# Patient Record
Sex: MALE | Race: BLACK OR AFRICAN AMERICAN | NOT HISPANIC OR LATINO | Employment: UNEMPLOYED | RURAL
[De-identification: names, ages, dates, MRNs, and addresses within clinical notes are randomized per-mention and may not be internally consistent; named-entity substitution may affect disease eponyms.]

---

## 2020-03-13 ENCOUNTER — HISTORICAL (OUTPATIENT)
Dept: ADMINISTRATIVE | Facility: HOSPITAL | Age: 45
End: 2020-03-13

## 2020-03-13 LAB
ALBUMIN SERPL BCP-MCNC: 2.8 G/DL (ref 3.5–5)
ALBUMIN/GLOB SERPL: 0.5 {RATIO}
ALP SERPL-CCNC: 174 U/L (ref 45–115)
ALT SERPL W P-5'-P-CCNC: 74 U/L (ref 16–61)
AST SERPL W P-5'-P-CCNC: 111 U/L (ref 15–37)
BASOPHILS # BLD AUTO: 0.02 X10E3/UL (ref 0–0.2)
BASOPHILS NFR BLD AUTO: 0.2 % (ref 0–1)
BILIRUB SERPL-MCNC: 6.2 MG/DL (ref 0–1.2)
BILIRUB UR QL STRIP: ABNORMAL MG/DL
BUN SERPL-MCNC: 14 MG/DL (ref 7–18)
CALCIUM SERPL-MCNC: 7.9 MG/DL (ref 8.5–10.1)
CHLORIDE SERPL-SCNC: 97 MMOL/L (ref 98–107)
CLARITY UR: CLEAR
CO2 SERPL-SCNC: 27 MMOL/L (ref 21–32)
COLOR UR: YELLOW
CREAT SERPL-MCNC: 0.92 MG/DL (ref 0.7–1.3)
EOSINOPHIL # BLD AUTO: 0.05 X10E3/UL (ref 0–0.5)
EOSINOPHIL NFR BLD AUTO: 0.6 % (ref 1–4)
ERYTHROCYTE [DISTWIDTH] IN BLOOD BY AUTOMATED COUNT: 16.6 % (ref 11.5–14.5)
GLOBULIN SER-MCNC: 5.5 G/DL (ref 2–4)
GLUCOSE SERPL-MCNC: 92 MG/DL (ref 74–106)
GLUCOSE UR STRIP-MCNC: NORMAL MG/DL
HCT VFR BLD AUTO: 33.4 % (ref 40–54)
HGB BLD-MCNC: 12.3 G/DL (ref 13.5–18)
KETONES UR STRIP-SCNC: ABNORMAL MG/DL
LEUKOCYTE ESTERASE UR QL STRIP: NEGATIVE LEU/UL
LYMPHOCYTES # BLD AUTO: 1.97 X10E3/UL (ref 1–4.8)
LYMPHOCYTES NFR BLD AUTO: 22.9 % (ref 27–41)
MCH RBC QN AUTO: 36.1 PG (ref 27–31)
MCHC RBC AUTO-ENTMCNC: 35.7 G/DL (ref 32–36)
MCV RBC AUTO: 98 FL (ref 80–96)
MONOCYTES # BLD AUTO: 1.53 X10E3/UL (ref 0–0.8)
MONOCYTES NFR BLD AUTO: 17.8 % (ref 2–6)
MPC BLD CALC-MCNC: 11.9 FL (ref 9.4–12.4)
NEUTROPHILS # BLD AUTO: 5.04 X10E3/UL (ref 1.8–7.7)
NEUTROPHILS NFR BLD AUTO: 58.5 % (ref 53–65)
NITRITE UR QL STRIP: NEGATIVE
PH UR STRIP: 6 PH UNITS (ref 5–8)
PLATELET # BLD AUTO: 95 X10E3/UL (ref 150–450)
POTASSIUM SERPL-SCNC: 3.4 MMOL/L (ref 3.5–5.1)
PROT SERPL-MCNC: 8.3 G/DL (ref 6.4–8.2)
PROT UR QL STRIP: NEGATIVE MG/DL
RBC # BLD AUTO: 3.41 X10E6/UL (ref 4.6–6.2)
RBC # UR STRIP: NEGATIVE ERY/UL
SODIUM SERPL-SCNC: 134 MMOL/L (ref 136–145)
SP GR UR STRIP: 1.02 (ref 1–1.03)
UROBILINOGEN UR STRIP-ACNC: 2 MG/DL
WBC # BLD AUTO: 8.61 X10E3/UL (ref 4.5–11)

## 2023-08-19 ENCOUNTER — HOSPITAL ENCOUNTER (INPATIENT)
Facility: HOSPITAL | Age: 48
LOS: 19 days | Discharge: ANOTHER HOSPITAL | DRG: 988 | End: 2023-09-08
Attending: EMERGENCY MEDICINE | Admitting: INTERNAL MEDICINE
Payer: COMMERCIAL

## 2023-08-19 DIAGNOSIS — R07.9 CHEST PAIN: ICD-10-CM

## 2023-08-19 DIAGNOSIS — E88.09 HYPOALBUMINEMIA: ICD-10-CM

## 2023-08-19 DIAGNOSIS — R60.9 EDEMA: Primary | ICD-10-CM

## 2023-08-19 DIAGNOSIS — K64.9 BLEEDING HEMORRHOID: ICD-10-CM

## 2023-08-19 DIAGNOSIS — K74.60 CIRRHOSIS: ICD-10-CM

## 2023-08-19 DIAGNOSIS — D64.9 ANEMIA, UNSPECIFIED TYPE: ICD-10-CM

## 2023-08-19 LAB
ABORH RETYPE: NORMAL
ALBUMIN SERPL BCP-MCNC: 2.1 G/DL (ref 3.5–5)
ALBUMIN/GLOB SERPL: 0.4 {RATIO}
ALP SERPL-CCNC: 166 U/L (ref 45–115)
ALT SERPL W P-5'-P-CCNC: 41 U/L (ref 16–61)
ANION GAP SERPL CALCULATED.3IONS-SCNC: 7 MMOL/L (ref 7–16)
APTT PPP: 48.2 SECONDS (ref 25.2–37.3)
AST SERPL W P-5'-P-CCNC: 61 U/L (ref 15–37)
BASOPHILS # BLD AUTO: 0.02 K/UL (ref 0–0.2)
BASOPHILS NFR BLD AUTO: 0.4 % (ref 0–1)
BILIRUB SERPL-MCNC: 6.5 MG/DL (ref ?–1.2)
BILIRUB UR QL STRIP: NEGATIVE
BUN SERPL-MCNC: 10 MG/DL (ref 7–18)
BUN/CREAT SERPL: 9 (ref 6–20)
CALCIUM SERPL-MCNC: 7.9 MG/DL (ref 8.5–10.1)
CHLORIDE SERPL-SCNC: 101 MMOL/L (ref 98–107)
CLARITY UR: CLEAR
CO2 SERPL-SCNC: 29 MMOL/L (ref 21–32)
COLOR UR: YELLOW
CREAT SERPL-MCNC: 1.11 MG/DL (ref 0.7–1.3)
DIFFERENTIAL METHOD BLD: ABNORMAL
EGFR (NO RACE VARIABLE) (RUSH/TITUS): 81 ML/MIN/1.73M2
EOSINOPHIL # BLD AUTO: 0.35 K/UL (ref 0–0.5)
EOSINOPHIL NFR BLD AUTO: 6.2 % (ref 1–4)
ERYTHROCYTE [DISTWIDTH] IN BLOOD BY AUTOMATED COUNT: 18.9 % (ref 11.5–14.5)
GLOBULIN SER-MCNC: 5.4 G/DL (ref 2–4)
GLUCOSE SERPL-MCNC: 106 MG/DL (ref 74–106)
GLUCOSE UR STRIP-MCNC: NORMAL MG/DL
HCT VFR BLD AUTO: 19 % (ref 40–54)
HGB BLD-MCNC: 6.1 G/DL (ref 13.5–18)
IMM GRANULOCYTES # BLD AUTO: 0.03 K/UL (ref 0–0.04)
IMM GRANULOCYTES NFR BLD: 0.5 % (ref 0–0.4)
INDIRECT COOMBS: NORMAL
INR BLD: 1.87
KETONES UR STRIP-SCNC: NEGATIVE MG/DL
LEUKOCYTE ESTERASE UR QL STRIP: NEGATIVE
LYMPHOCYTES # BLD AUTO: 1 K/UL (ref 1–4.8)
LYMPHOCYTES NFR BLD AUTO: 17.7 % (ref 27–41)
MAGNESIUM SERPL-MCNC: 2.2 MG/DL (ref 1.7–2.3)
MCH RBC QN AUTO: 31.6 PG (ref 27–31)
MCHC RBC AUTO-ENTMCNC: 32.1 G/DL (ref 32–36)
MCV RBC AUTO: 98.4 FL (ref 80–96)
MONOCYTES # BLD AUTO: 1.08 K/UL (ref 0–0.8)
MONOCYTES NFR BLD AUTO: 19.1 % (ref 2–6)
MPC BLD CALC-MCNC: 12.6 FL (ref 9.4–12.4)
NEUTROPHILS # BLD AUTO: 3.18 K/UL (ref 1.8–7.7)
NEUTROPHILS NFR BLD AUTO: 56.1 % (ref 53–65)
NITRITE UR QL STRIP: NEGATIVE
NRBC # BLD AUTO: 0 X10E3/UL
NRBC, AUTO (.00): 0 %
NT-PROBNP SERPL-MCNC: 255 PG/ML (ref 1–125)
PH UR STRIP: 6 PH UNITS
PLATELET # BLD AUTO: 78 K/UL (ref 150–400)
POC OCCULT BLOOD STOOL: NEGATIVE
POTASSIUM SERPL-SCNC: 2.8 MMOL/L (ref 3.5–5.1)
PROT SERPL-MCNC: 7.5 G/DL (ref 6.4–8.2)
PROT UR QL STRIP: NEGATIVE
PROTHROMBIN TIME: 21.2 SECONDS (ref 11.7–14.7)
RBC # BLD AUTO: 1.93 M/UL (ref 4.6–6.2)
RBC # UR STRIP: NEGATIVE /UL
RH BLD: NORMAL
SODIUM SERPL-SCNC: 134 MMOL/L (ref 136–145)
SP GR UR STRIP: 1.01
SPECIMEN OUTDATE: NORMAL
TROPONIN I SERPL DL<=0.01 NG/ML-MCNC: 16.7 PG/ML
UROBILINOGEN UR STRIP-ACNC: 6 MG/DL
WBC # BLD AUTO: 5.66 K/UL (ref 4.5–11)

## 2023-08-19 PROCEDURE — 82272 OCCULT BLD FECES 1-3 TESTS: CPT

## 2023-08-19 PROCEDURE — 99285 PR EMERGENCY DEPT VISIT,LEVEL V: ICD-10-PCS | Mod: ,,, | Performed by: EMERGENCY MEDICINE

## 2023-08-19 PROCEDURE — 86900 BLOOD TYPING SEROLOGIC ABO: CPT | Performed by: EMERGENCY MEDICINE

## 2023-08-19 PROCEDURE — 93010 EKG 12-LEAD: ICD-10-PCS | Mod: ,,, | Performed by: HOSPITALIST

## 2023-08-19 PROCEDURE — 80053 COMPREHEN METABOLIC PANEL: CPT | Performed by: EMERGENCY MEDICINE

## 2023-08-19 PROCEDURE — 99285 EMERGENCY DEPT VISIT HI MDM: CPT | Mod: ,,, | Performed by: EMERGENCY MEDICINE

## 2023-08-19 PROCEDURE — 93005 ELECTROCARDIOGRAM TRACING: CPT

## 2023-08-19 PROCEDURE — 86923 COMPATIBILITY TEST ELECTRIC: CPT | Performed by: EMERGENCY MEDICINE

## 2023-08-19 PROCEDURE — 86923 COMPATIBILITY TEST ELECTRIC: CPT | Mod: 91 | Performed by: NURSE PRACTITIONER

## 2023-08-19 PROCEDURE — 99285 EMERGENCY DEPT VISIT HI MDM: CPT

## 2023-08-19 PROCEDURE — 85025 COMPLETE CBC W/AUTO DIFF WBC: CPT | Performed by: EMERGENCY MEDICINE

## 2023-08-19 PROCEDURE — 81003 URINALYSIS AUTO W/O SCOPE: CPT | Performed by: EMERGENCY MEDICINE

## 2023-08-19 PROCEDURE — 84484 ASSAY OF TROPONIN QUANT: CPT | Performed by: EMERGENCY MEDICINE

## 2023-08-19 PROCEDURE — 25000003 PHARM REV CODE 250: Performed by: EMERGENCY MEDICINE

## 2023-08-19 PROCEDURE — 93010 ELECTROCARDIOGRAM REPORT: CPT | Mod: ,,, | Performed by: HOSPITALIST

## 2023-08-19 PROCEDURE — 83880 ASSAY OF NATRIURETIC PEPTIDE: CPT | Performed by: EMERGENCY MEDICINE

## 2023-08-19 PROCEDURE — 83735 ASSAY OF MAGNESIUM: CPT | Performed by: EMERGENCY MEDICINE

## 2023-08-19 PROCEDURE — P9016 RBC LEUKOCYTES REDUCED: HCPCS | Performed by: EMERGENCY MEDICINE

## 2023-08-19 PROCEDURE — 85730 THROMBOPLASTIN TIME PARTIAL: CPT | Performed by: EMERGENCY MEDICINE

## 2023-08-19 PROCEDURE — 85610 PROTHROMBIN TIME: CPT | Performed by: EMERGENCY MEDICINE

## 2023-08-19 RX ORDER — ROSUVASTATIN CALCIUM 5 MG/1
5 TABLET, COATED ORAL
COMMUNITY
Start: 2023-04-12 | End: 2023-12-28 | Stop reason: ALTCHOICE

## 2023-08-19 RX ORDER — POTASSIUM CHLORIDE 20 MEQ/1
40 TABLET, EXTENDED RELEASE ORAL
Status: COMPLETED | OUTPATIENT
Start: 2023-08-19 | End: 2023-08-19

## 2023-08-19 RX ORDER — ATENOLOL AND CHLORTHALIDONE TABLET 50; 25 MG/1; MG/1
1 TABLET ORAL
COMMUNITY
Start: 2023-07-19 | End: 2023-12-28 | Stop reason: ALTCHOICE

## 2023-08-19 RX ORDER — HYDROCODONE BITARTRATE AND ACETAMINOPHEN 500; 5 MG/1; MG/1
TABLET ORAL
Status: DISCONTINUED | OUTPATIENT
Start: 2023-08-19 | End: 2023-08-27

## 2023-08-19 RX ADMIN — POTASSIUM CHLORIDE 40 MEQ: 1500 TABLET, EXTENDED RELEASE ORAL at 10:08

## 2023-08-20 ENCOUNTER — ANESTHESIA (OUTPATIENT)
Dept: SURGERY | Facility: HOSPITAL | Age: 48
DRG: 988 | End: 2023-08-20
Payer: COMMERCIAL

## 2023-08-20 ENCOUNTER — ANESTHESIA EVENT (OUTPATIENT)
Dept: SURGERY | Facility: HOSPITAL | Age: 48
DRG: 988 | End: 2023-08-20
Payer: COMMERCIAL

## 2023-08-20 PROBLEM — K92.2 ACUTE LOWER GI BLEEDING: Status: ACTIVE | Noted: 2023-08-20

## 2023-08-20 PROBLEM — E46 MALNUTRITION: Status: ACTIVE | Noted: 2023-08-19

## 2023-08-20 PROBLEM — I10 HYPERTENSION: Status: ACTIVE | Noted: 2023-08-20

## 2023-08-20 PROBLEM — K64.9 BLEEDING HEMORRHOID: Status: ACTIVE | Noted: 2023-08-20

## 2023-08-20 PROBLEM — R18.8 ASCITES: Status: ACTIVE | Noted: 2023-08-19

## 2023-08-20 PROBLEM — K70.30 ALCOHOLIC CIRRHOSIS OF LIVER: Status: ACTIVE | Noted: 2023-08-20

## 2023-08-20 PROBLEM — R18.8 ASCITES: Status: RESOLVED | Noted: 2023-08-19 | Resolved: 2023-08-20

## 2023-08-20 LAB
ABO + RH BLD: NORMAL
ABO + RH BLD: NORMAL
ALBUMIN SERPL BCP-MCNC: 1.8 G/DL (ref 3.5–5)
ALBUMIN/GLOB SERPL: 0.4 {RATIO}
ALP SERPL-CCNC: 171 U/L (ref 45–115)
ALT SERPL W P-5'-P-CCNC: 36 U/L (ref 16–61)
ANION GAP SERPL CALCULATED.3IONS-SCNC: 8 MMOL/L (ref 7–16)
ANISOCYTOSIS BLD QL SMEAR: ABNORMAL
AST SERPL W P-5'-P-CCNC: 56 U/L (ref 15–37)
BASOPHILS # BLD AUTO: 0.04 K/UL (ref 0–0.2)
BASOPHILS NFR BLD AUTO: 0.7 % (ref 0–1)
BASOPHILS NFR BLD MANUAL: 2 % (ref 0–1)
BILIRUB SERPL-MCNC: 5.5 MG/DL (ref ?–1.2)
BLD PROD TYP BPU: NORMAL
BLD PROD TYP BPU: NORMAL
BLOOD UNIT EXPIRATION DATE: NORMAL
BLOOD UNIT EXPIRATION DATE: NORMAL
BLOOD UNIT TYPE CODE: 5100
BLOOD UNIT TYPE CODE: 5100
BUN SERPL-MCNC: 10 MG/DL (ref 7–18)
BUN/CREAT SERPL: 10 (ref 6–20)
CALCIUM SERPL-MCNC: 7.5 MG/DL (ref 8.5–10.1)
CHLORIDE SERPL-SCNC: 102 MMOL/L (ref 98–107)
CO2 SERPL-SCNC: 28 MMOL/L (ref 21–32)
CREAT SERPL-MCNC: 1.04 MG/DL (ref 0.7–1.3)
CROSSMATCH INTERPRETATION: NORMAL
CROSSMATCH INTERPRETATION: NORMAL
DIFFERENTIAL METHOD BLD: ABNORMAL
DISPENSE STATUS: NORMAL
DISPENSE STATUS: NORMAL
EGFR (NO RACE VARIABLE) (RUSH/TITUS): 87 ML/MIN/1.73M2
EOSINOPHIL # BLD AUTO: 0.36 K/UL (ref 0–0.5)
EOSINOPHIL NFR BLD AUTO: 6.1 % (ref 1–4)
EOSINOPHIL NFR BLD MANUAL: 5 % (ref 1–4)
ERYTHROCYTE [DISTWIDTH] IN BLOOD BY AUTOMATED COUNT: 19.9 % (ref 11.5–14.5)
FERRITIN SERPL-MCNC: 31 NG/ML (ref 26–388)
FOLATE SERPL-MCNC: 4.7 NG/ML (ref 3.1–17.5)
GLOBULIN SER-MCNC: 5 G/DL (ref 2–4)
GLUCOSE SERPL-MCNC: 94 MG/DL (ref 74–106)
HCT VFR BLD AUTO: 18.6 % (ref 40–54)
HCT VFR BLD AUTO: 21.5 % (ref 40–54)
HCT VFR BLD AUTO: 21.8 % (ref 40–54)
HGB BLD-MCNC: 6 G/DL (ref 13.5–18)
HGB BLD-MCNC: 7.1 G/DL (ref 13.5–18)
HGB BLD-MCNC: 7.2 G/DL (ref 13.5–18)
IMM GRANULOCYTES # BLD AUTO: 0.03 K/UL (ref 0–0.04)
IMM GRANULOCYTES NFR BLD: 0.5 % (ref 0–0.4)
IRON SATN MFR SERPL: 7 % (ref 14–50)
IRON SERPL-MCNC: 18 ΜG/DL (ref 65–175)
LYMPHOCYTES # BLD AUTO: 1.2 K/UL (ref 1–4.8)
LYMPHOCYTES NFR BLD AUTO: 20.3 % (ref 27–41)
LYMPHOCYTES NFR BLD MANUAL: 27 % (ref 27–41)
MCH RBC QN AUTO: 30.6 PG (ref 27–31)
MCHC RBC AUTO-ENTMCNC: 32.3 G/DL (ref 32–36)
MCV RBC AUTO: 94.9 FL (ref 80–96)
MONOCYTES # BLD AUTO: 1.25 K/UL (ref 0–0.8)
MONOCYTES NFR BLD AUTO: 21.2 % (ref 2–6)
MONOCYTES NFR BLD MANUAL: 22 % (ref 2–6)
MPC BLD CALC-MCNC: 12.3 FL (ref 9.4–12.4)
NEUTROPHILS # BLD AUTO: 3.02 K/UL (ref 1.8–7.7)
NEUTROPHILS NFR BLD AUTO: 51.2 % (ref 53–65)
NEUTS BAND NFR BLD MANUAL: 1 % (ref 1–5)
NEUTS SEG NFR BLD MANUAL: 43 % (ref 50–62)
NRBC # BLD AUTO: 0 X10E3/UL
NRBC, AUTO (.00): 0 %
OVALOCYTES BLD QL SMEAR: ABNORMAL
PLATELET # BLD AUTO: 71 K/UL (ref 150–400)
PLATELET MORPHOLOGY: ABNORMAL
POTASSIUM SERPL-SCNC: 2.7 MMOL/L (ref 3.5–5.1)
PROT SERPL-MCNC: 6.8 G/DL (ref 6.4–8.2)
RBC # BLD AUTO: 1.96 M/UL (ref 4.6–6.2)
SARS-COV-2 RDRP RESP QL NAA+PROBE: NEGATIVE
SODIUM SERPL-SCNC: 135 MMOL/L (ref 136–145)
TIBC SERPL-MCNC: 266 ΜG/DL (ref 250–450)
UNIT NUMBER: NORMAL
UNIT NUMBER: NORMAL
VIT B12 SERPL-MCNC: 1083 PG/ML (ref 193–986)
WBC # BLD AUTO: 5.9 K/UL (ref 4.5–11)

## 2023-08-20 PROCEDURE — 25000003 PHARM REV CODE 250: Performed by: GENERAL PRACTICE

## 2023-08-20 PROCEDURE — 99222 PR INITIAL HOSPITAL CARE,LEVL II: ICD-10-PCS | Mod: ,,, | Performed by: INTERNAL MEDICINE

## 2023-08-20 PROCEDURE — 99223 PR INITIAL HOSPITAL CARE,LEVL III: ICD-10-PCS | Mod: ,,, | Performed by: INTERNAL MEDICINE

## 2023-08-20 PROCEDURE — 99222 1ST HOSP IP/OBS MODERATE 55: CPT | Mod: ,,, | Performed by: INTERNAL MEDICINE

## 2023-08-20 PROCEDURE — 63600175 PHARM REV CODE 636 W HCPCS: Performed by: GENERAL PRACTICE

## 2023-08-20 PROCEDURE — 37000009 HC ANESTHESIA EA ADD 15 MINS: Performed by: STUDENT IN AN ORGANIZED HEALTH CARE EDUCATION/TRAINING PROGRAM

## 2023-08-20 PROCEDURE — 63600175 PHARM REV CODE 636 W HCPCS: Performed by: ANESTHESIOLOGY

## 2023-08-20 PROCEDURE — 83550 IRON BINDING TEST: CPT | Performed by: GENERAL PRACTICE

## 2023-08-20 PROCEDURE — 36000707: Performed by: STUDENT IN AN ORGANIZED HEALTH CARE EDUCATION/TRAINING PROGRAM

## 2023-08-20 PROCEDURE — 37000008 HC ANESTHESIA 1ST 15 MINUTES: Performed by: STUDENT IN AN ORGANIZED HEALTH CARE EDUCATION/TRAINING PROGRAM

## 2023-08-20 PROCEDURE — 63600175 PHARM REV CODE 636 W HCPCS: Performed by: INTERNAL MEDICINE

## 2023-08-20 PROCEDURE — 36000706: Performed by: STUDENT IN AN ORGANIZED HEALTH CARE EDUCATION/TRAINING PROGRAM

## 2023-08-20 PROCEDURE — C9113 INJ PANTOPRAZOLE SODIUM, VIA: HCPCS | Performed by: INTERNAL MEDICINE

## 2023-08-20 PROCEDURE — 82746 ASSAY OF FOLIC ACID SERUM: CPT | Performed by: GENERAL PRACTICE

## 2023-08-20 PROCEDURE — 46221 LIGATION OF HEMORRHOID(S): CPT | Mod: 22,,, | Performed by: STUDENT IN AN ORGANIZED HEALTH CARE EDUCATION/TRAINING PROGRAM

## 2023-08-20 PROCEDURE — 25000003 PHARM REV CODE 250: Performed by: INTERNAL MEDICINE

## 2023-08-20 PROCEDURE — 25000003 PHARM REV CODE 250: Performed by: STUDENT IN AN ORGANIZED HEALTH CARE EDUCATION/TRAINING PROGRAM

## 2023-08-20 PROCEDURE — P9016 RBC LEUKOCYTES REDUCED: HCPCS | Performed by: EMERGENCY MEDICINE

## 2023-08-20 PROCEDURE — 11000001 HC ACUTE MED/SURG PRIVATE ROOM

## 2023-08-20 PROCEDURE — 99223 1ST HOSP IP/OBS HIGH 75: CPT | Mod: ,,, | Performed by: INTERNAL MEDICINE

## 2023-08-20 PROCEDURE — 80053 COMPREHEN METABOLIC PANEL: CPT | Performed by: GENERAL PRACTICE

## 2023-08-20 PROCEDURE — 85014 HEMATOCRIT: CPT | Performed by: GENERAL PRACTICE

## 2023-08-20 PROCEDURE — D9220A PRA ANESTHESIA: ICD-10-PCS | Mod: ,,, | Performed by: ANESTHESIOLOGY

## 2023-08-20 PROCEDURE — 36430 TRANSFUSION BLD/BLD COMPNT: CPT

## 2023-08-20 PROCEDURE — 25000003 PHARM REV CODE 250: Performed by: ANESTHESIOLOGY

## 2023-08-20 PROCEDURE — 25000003 PHARM REV CODE 250: Performed by: EMERGENCY MEDICINE

## 2023-08-20 PROCEDURE — 63600175 PHARM REV CODE 636 W HCPCS: Performed by: STUDENT IN AN ORGANIZED HEALTH CARE EDUCATION/TRAINING PROGRAM

## 2023-08-20 PROCEDURE — 87635 SARS-COV-2 COVID-19 AMP PRB: CPT | Performed by: GENERAL PRACTICE

## 2023-08-20 PROCEDURE — 99223 1ST HOSP IP/OBS HIGH 75: CPT | Mod: ,,, | Performed by: STUDENT IN AN ORGANIZED HEALTH CARE EDUCATION/TRAINING PROGRAM

## 2023-08-20 PROCEDURE — 83540 ASSAY OF IRON: CPT | Performed by: GENERAL PRACTICE

## 2023-08-20 PROCEDURE — 85025 COMPLETE CBC W/AUTO DIFF WBC: CPT | Performed by: GENERAL PRACTICE

## 2023-08-20 PROCEDURE — C9290 INJ, BUPIVACAINE LIPOSOME: HCPCS | Performed by: STUDENT IN AN ORGANIZED HEALTH CARE EDUCATION/TRAINING PROGRAM

## 2023-08-20 PROCEDURE — 46221 PR HEMORRHOIDECTOMY INTERNAL RUBBER BAND LIGATIONS: ICD-10-PCS | Mod: 22,,, | Performed by: STUDENT IN AN ORGANIZED HEALTH CARE EDUCATION/TRAINING PROGRAM

## 2023-08-20 PROCEDURE — 27201423 OPTIME MED/SURG SUP & DEVICES STERILE SUPPLY: Performed by: STUDENT IN AN ORGANIZED HEALTH CARE EDUCATION/TRAINING PROGRAM

## 2023-08-20 PROCEDURE — D9220A PRA ANESTHESIA: Mod: ,,, | Performed by: ANESTHESIOLOGY

## 2023-08-20 PROCEDURE — 71000033 HC RECOVERY, INTIAL HOUR: Performed by: STUDENT IN AN ORGANIZED HEALTH CARE EDUCATION/TRAINING PROGRAM

## 2023-08-20 PROCEDURE — 82728 ASSAY OF FERRITIN: CPT | Performed by: GENERAL PRACTICE

## 2023-08-20 PROCEDURE — 99223 PR INITIAL HOSPITAL CARE,LEVL III: ICD-10-PCS | Mod: ,,, | Performed by: STUDENT IN AN ORGANIZED HEALTH CARE EDUCATION/TRAINING PROGRAM

## 2023-08-20 RX ORDER — TALC
6 POWDER (GRAM) TOPICAL NIGHTLY PRN
Status: DISCONTINUED | OUTPATIENT
Start: 2023-08-20 | End: 2023-09-09 | Stop reason: HOSPADM

## 2023-08-20 RX ORDER — POTASSIUM CHLORIDE 20 MEQ/1
40 TABLET, EXTENDED RELEASE ORAL ONCE
Status: COMPLETED | OUTPATIENT
Start: 2023-08-20 | End: 2023-08-20

## 2023-08-20 RX ORDER — HYDROMORPHONE HYDROCHLORIDE 2 MG/ML
0.5 INJECTION, SOLUTION INTRAMUSCULAR; INTRAVENOUS; SUBCUTANEOUS EVERY 5 MIN PRN
Status: DISCONTINUED | OUTPATIENT
Start: 2023-08-20 | End: 2023-08-20 | Stop reason: HOSPADM

## 2023-08-20 RX ORDER — FOLIC ACID 1 MG/1
1 TABLET ORAL DAILY
Status: DISCONTINUED | OUTPATIENT
Start: 2023-08-20 | End: 2023-09-09 | Stop reason: HOSPADM

## 2023-08-20 RX ORDER — SODIUM CHLORIDE, SODIUM LACTATE, POTASSIUM CHLORIDE, CALCIUM CHLORIDE 600; 310; 30; 20 MG/100ML; MG/100ML; MG/100ML; MG/100ML
125 INJECTION, SOLUTION INTRAVENOUS CONTINUOUS
Status: DISCONTINUED | OUTPATIENT
Start: 2023-08-20 | End: 2023-08-24

## 2023-08-20 RX ORDER — DIPHENHYDRAMINE HYDROCHLORIDE 50 MG/ML
25 INJECTION INTRAMUSCULAR; INTRAVENOUS EVERY 6 HOURS PRN
Status: DISCONTINUED | OUTPATIENT
Start: 2023-08-20 | End: 2023-08-20 | Stop reason: HOSPADM

## 2023-08-20 RX ORDER — LIDOCAINE HYDROCHLORIDE 20 MG/ML
JELLY TOPICAL
Status: DISCONTINUED | OUTPATIENT
Start: 2023-08-20 | End: 2023-09-09 | Stop reason: HOSPADM

## 2023-08-20 RX ORDER — POLYETHYLENE GLYCOL 3350 17 G/17G
17 POWDER, FOR SOLUTION ORAL 2 TIMES DAILY PRN
Status: DISCONTINUED | OUTPATIENT
Start: 2023-08-20 | End: 2023-08-24

## 2023-08-20 RX ORDER — PROCHLORPERAZINE EDISYLATE 5 MG/ML
5 INJECTION INTRAMUSCULAR; INTRAVENOUS EVERY 6 HOURS PRN
Status: DISCONTINUED | OUTPATIENT
Start: 2023-08-20 | End: 2023-09-09 | Stop reason: HOSPADM

## 2023-08-20 RX ORDER — HYDROCODONE BITARTRATE AND ACETAMINOPHEN 500; 5 MG/1; MG/1
TABLET ORAL
Status: DISCONTINUED | OUTPATIENT
Start: 2023-08-20 | End: 2023-08-27

## 2023-08-20 RX ORDER — ONDANSETRON 2 MG/ML
4 INJECTION INTRAMUSCULAR; INTRAVENOUS DAILY PRN
Status: DISCONTINUED | OUTPATIENT
Start: 2023-08-20 | End: 2023-08-20 | Stop reason: HOSPADM

## 2023-08-20 RX ORDER — ONDANSETRON 2 MG/ML
4 INJECTION INTRAMUSCULAR; INTRAVENOUS EVERY 8 HOURS PRN
Status: DISCONTINUED | OUTPATIENT
Start: 2023-08-20 | End: 2023-09-09 | Stop reason: HOSPADM

## 2023-08-20 RX ORDER — NALOXONE HCL 0.4 MG/ML
0.02 VIAL (ML) INJECTION
Status: DISCONTINUED | OUTPATIENT
Start: 2023-08-20 | End: 2023-09-09 | Stop reason: HOSPADM

## 2023-08-20 RX ORDER — ACETAMINOPHEN 325 MG/1
650 TABLET ORAL EVERY 4 HOURS PRN
Status: DISCONTINUED | OUTPATIENT
Start: 2023-08-20 | End: 2023-09-09 | Stop reason: HOSPADM

## 2023-08-20 RX ORDER — CEFAZOLIN SODIUM 1 G/3ML
INJECTION, POWDER, FOR SOLUTION INTRAMUSCULAR; INTRAVENOUS
Status: DISCONTINUED | OUTPATIENT
Start: 2023-08-20 | End: 2023-08-20

## 2023-08-20 RX ORDER — MEPERIDINE HYDROCHLORIDE 25 MG/ML
25 INJECTION INTRAMUSCULAR; INTRAVENOUS; SUBCUTANEOUS EVERY 10 MIN PRN
Status: DISCONTINUED | OUTPATIENT
Start: 2023-08-20 | End: 2023-08-20 | Stop reason: HOSPADM

## 2023-08-20 RX ORDER — FENTANYL CITRATE 50 UG/ML
INJECTION, SOLUTION INTRAMUSCULAR; INTRAVENOUS
Status: DISCONTINUED | OUTPATIENT
Start: 2023-08-20 | End: 2023-08-20

## 2023-08-20 RX ORDER — OXYCODONE AND ACETAMINOPHEN 10; 325 MG/1; MG/1
1 TABLET ORAL EVERY 4 HOURS PRN
Status: DISCONTINUED | OUTPATIENT
Start: 2023-08-20 | End: 2023-09-09 | Stop reason: HOSPADM

## 2023-08-20 RX ORDER — POTASSIUM CHLORIDE 7.45 MG/ML
40 INJECTION INTRAVENOUS ONCE
Status: COMPLETED | OUTPATIENT
Start: 2023-08-20 | End: 2023-08-20

## 2023-08-20 RX ORDER — BISOPROLOL FUMARATE AND HYDROCHLOROTHIAZIDE 5; 6.25 MG/1; MG/1
1 TABLET ORAL DAILY
Status: DISCONTINUED | OUTPATIENT
Start: 2023-08-20 | End: 2023-08-20

## 2023-08-20 RX ORDER — PANTOPRAZOLE SODIUM 40 MG/10ML
40 INJECTION, POWDER, LYOPHILIZED, FOR SOLUTION INTRAVENOUS 2 TIMES DAILY
Status: DISCONTINUED | OUTPATIENT
Start: 2023-08-20 | End: 2023-08-25

## 2023-08-20 RX ORDER — HYDROMORPHONE HYDROCHLORIDE 2 MG/ML
1 INJECTION, SOLUTION INTRAMUSCULAR; INTRAVENOUS; SUBCUTANEOUS
Status: DISCONTINUED | OUTPATIENT
Start: 2023-08-20 | End: 2023-09-09 | Stop reason: HOSPADM

## 2023-08-20 RX ORDER — KETOROLAC TROMETHAMINE 30 MG/ML
INJECTION, SOLUTION INTRAMUSCULAR; INTRAVENOUS
Status: DISCONTINUED | OUTPATIENT
Start: 2023-08-20 | End: 2023-08-20

## 2023-08-20 RX ORDER — LIDOCAINE HYDROCHLORIDE 20 MG/ML
INJECTION, SOLUTION EPIDURAL; INFILTRATION; INTRACAUDAL; PERINEURAL
Status: DISCONTINUED | OUTPATIENT
Start: 2023-08-20 | End: 2023-08-20

## 2023-08-20 RX ORDER — PROPOFOL 10 MG/ML
VIAL (ML) INTRAVENOUS
Status: DISCONTINUED | OUTPATIENT
Start: 2023-08-20 | End: 2023-08-20

## 2023-08-20 RX ORDER — BUPIVACAINE HYDROCHLORIDE 5 MG/ML
INJECTION, SOLUTION EPIDURAL; INTRACAUDAL
Status: DISCONTINUED | OUTPATIENT
Start: 2023-08-20 | End: 2023-08-20 | Stop reason: HOSPADM

## 2023-08-20 RX ORDER — ONDANSETRON 2 MG/ML
4 INJECTION INTRAMUSCULAR; INTRAVENOUS EVERY 6 HOURS PRN
Status: DISCONTINUED | OUTPATIENT
Start: 2023-08-20 | End: 2023-09-09 | Stop reason: HOSPADM

## 2023-08-20 RX ORDER — LIDOCAINE HYDROCHLORIDE 20 MG/ML
JELLY TOPICAL
Status: DISCONTINUED | OUTPATIENT
Start: 2023-08-20 | End: 2023-08-20 | Stop reason: HOSPADM

## 2023-08-20 RX ORDER — OXYCODONE HYDROCHLORIDE 5 MG/1
5 TABLET ORAL
Status: DISCONTINUED | OUTPATIENT
Start: 2023-08-20 | End: 2023-08-20 | Stop reason: HOSPADM

## 2023-08-20 RX ORDER — LORAZEPAM 2 MG/ML
1 INJECTION INTRAMUSCULAR
Status: DISCONTINUED | OUTPATIENT
Start: 2023-08-20 | End: 2023-09-09 | Stop reason: HOSPADM

## 2023-08-20 RX ORDER — MORPHINE SULFATE 10 MG/ML
4 INJECTION INTRAMUSCULAR; INTRAVENOUS; SUBCUTANEOUS EVERY 5 MIN PRN
Status: DISCONTINUED | OUTPATIENT
Start: 2023-08-20 | End: 2023-08-20 | Stop reason: HOSPADM

## 2023-08-20 RX ORDER — ATORVASTATIN CALCIUM 10 MG/1
20 TABLET, FILM COATED ORAL DAILY
Status: DISCONTINUED | OUTPATIENT
Start: 2023-08-20 | End: 2023-08-20

## 2023-08-20 RX ORDER — SIMETHICONE 80 MG
1 TABLET,CHEWABLE ORAL 4 TIMES DAILY PRN
Status: DISCONTINUED | OUTPATIENT
Start: 2023-08-20 | End: 2023-09-09 | Stop reason: HOSPADM

## 2023-08-20 RX ORDER — SODIUM CHLORIDE 0.9 % (FLUSH) 0.9 %
10 SYRINGE (ML) INJECTION EVERY 12 HOURS PRN
Status: DISCONTINUED | OUTPATIENT
Start: 2023-08-20 | End: 2023-09-09 | Stop reason: HOSPADM

## 2023-08-20 RX ORDER — SODIUM CHLORIDE 9 MG/ML
INJECTION, SOLUTION INTRAVENOUS CONTINUOUS
Status: DISPENSED | OUTPATIENT
Start: 2023-08-20 | End: 2023-08-20

## 2023-08-20 RX ADMIN — THIAMINE HYDROCHLORIDE 500 MG: 100 INJECTION, SOLUTION INTRAMUSCULAR; INTRAVENOUS at 08:08

## 2023-08-20 RX ADMIN — PROPOFOL 200 MG: 10 INJECTION, EMULSION INTRAVENOUS at 03:08

## 2023-08-20 RX ADMIN — THIAMINE HYDROCHLORIDE 500 MG: 100 INJECTION, SOLUTION INTRAMUSCULAR; INTRAVENOUS at 03:08

## 2023-08-20 RX ADMIN — PANTOPRAZOLE SODIUM 40 MG: 40 INJECTION, POWDER, FOR SOLUTION INTRAVENOUS at 09:08

## 2023-08-20 RX ADMIN — POTASSIUM CHLORIDE 40 MEQ: 7.46 INJECTION, SOLUTION INTRAVENOUS at 09:08

## 2023-08-20 RX ADMIN — PHYTONADIONE 2.5 MG: 10 INJECTION, EMULSION INTRAMUSCULAR; INTRAVENOUS; SUBCUTANEOUS at 05:08

## 2023-08-20 RX ADMIN — POTASSIUM CHLORIDE 40 MEQ: 1500 TABLET, EXTENDED RELEASE ORAL at 08:08

## 2023-08-20 RX ADMIN — BUPIVACAINE 13.3 MG: 13.3 INJECTION, SUSPENSION, LIPOSOMAL INFILTRATION at 06:08

## 2023-08-20 RX ADMIN — KETOROLAC TROMETHAMINE 60 MG: 30 INJECTION, SOLUTION INTRAMUSCULAR at 03:08

## 2023-08-20 RX ADMIN — SODIUM CHLORIDE: 9 INJECTION, SOLUTION INTRAVENOUS at 12:08

## 2023-08-20 RX ADMIN — LIDOCAINE HYDROCHLORIDE 40 MG: 20 INJECTION, SOLUTION INTRAVENOUS at 03:08

## 2023-08-20 RX ADMIN — PANTOPRAZOLE SODIUM 40 MG: 40 INJECTION, POWDER, FOR SOLUTION INTRAVENOUS at 08:08

## 2023-08-20 RX ADMIN — SODIUM CHLORIDE: 9 INJECTION, SOLUTION INTRAVENOUS at 07:08

## 2023-08-20 RX ADMIN — SODIUM CHLORIDE: 9 INJECTION, SOLUTION INTRAVENOUS at 09:08

## 2023-08-20 RX ADMIN — THIAMINE HYDROCHLORIDE 500 MG: 100 INJECTION, SOLUTION INTRAMUSCULAR; INTRAVENOUS at 09:08

## 2023-08-20 RX ADMIN — CEFAZOLIN 2 G: 1 INJECTION, POWDER, FOR SOLUTION INTRAMUSCULAR; INTRAVENOUS; PARENTERAL at 03:08

## 2023-08-20 RX ADMIN — FENTANYL CITRATE 100 MCG: 50 INJECTION INTRAMUSCULAR; INTRAVENOUS at 03:08

## 2023-08-20 NOTE — ASSESSMENT & PLAN NOTE
Likely secondary to liver cirrhosis  H/H 6.1/19 on admission  Patient receiving 1 unit of PRBC in the ED at this time  Monitor H/H Q6h  Transfuse accordingly  Consult GI

## 2023-08-20 NOTE — HPI
Patient is a 47 y/o male with PMH of HTN, HLD, cirrhosis of the liver and hemorrhoids who presents to the ED with complaint of COB and swelling of the abdomen, legs and feet that started about 1 month ago. Patient states that the swelling and SOB has been worsening for the last 2 weeks. Patient reports passing dark blood per rectum with bowel movement for the last 2 weeks. Patient reports having intermittent RUQ when he eats fried foot. Patient reports chills but no fever. Patient denies nausea, vomiting, chest pain or urinary changes. Patient reports drinking about 10 beers of 16 ounces per day for the 3 years and for the last months he stopped drinking beer and started drinking  liquor. He states that 1 fifth of liquor lasts for 3 days. Patient reports withdrawal symptoms in the past. Patient reports that he quit smoking cigarettes 5 years ago.      In the ED showed /67, HR 90, RR 16, SpO2 98% and afebrile. Blood work showed H/H 6.1/19, WBC 5.6, PLT 78, PT 21.2, INR 1.87, PTT 48.2. Sodium 134, potassium 2.8, Alkaline phosphatase 166, Total bilirrubin 6.5, AST/ALST 61/41, p-, troponin 16.7. UA negative for infection or blood. FOBT negative. EKG nsr with HR 89. Patient is receiving PRBC in the ER at this moment and electrolytes are being replenished. Patient will be admitted to the hospital for further management.

## 2023-08-20 NOTE — ANESTHESIA PREPROCEDURE EVALUATION
08/20/2023  John Spears is a 48 y.o., male.      Pre-op Assessment    I have reviewed the Patient Summary Reports.     I have reviewed the Nursing Notes. I have reviewed the NPO Status.   I have reviewed the Medications.     Review of Systems  Anesthesia Hx:  No problems with previous Anesthesia    Social:  No Alcohol Use, Alcohol Use    Hematology/Oncology:     Oncology Normal    -- Anemia:   EENT/Dental:EENT/Dental Normal   Cardiovascular:   Hypertension hyperlipidemia    Pulmonary:  Pulmonary Normal    Renal/:  Renal/ Normal     Hepatic/GI:   Liver Disease, Hx ETOH cirrhosis, denies alcohol now   Musculoskeletal:  Musculoskeletal Normal    Neurological:  Neurology Normal    Endocrine:   Hypothyroidism  Morbid Obesity / BMI > 40  Dermatological:  Skin Normal    Psych:  Psychiatric Normal           Physical Exam  General: Well nourished    Airway:  Mallampati: II / II  Mouth Opening: Normal  TM Distance: > 6 cm  Tongue: Normal  Neck ROM: Normal ROM    Chest/Lungs:  Clear to auscultation, Normal Respiratory Rate    Heart:  Rate: Normal  Rhythm: Regular Rhythm        Anesthesia Plan  Type of Anesthesia, risks & benefits discussed:    Anesthesia Type: Gen Supraglottic Airway  Intra-op Monitoring Plan: Standard ASA Monitors  Post Op Pain Control Plan: multimodal analgesia  Induction:  IV  Informed Consent: Informed consent signed with the Patient and all parties understand the risks and agree with anesthesia plan.  All questions answered. Patient consented to blood products? Yes  ASA Score: 2 Emergent  Day of Surgery Review of History & Physical: H&P Update referred to the surgeon/provider.I have interviewed and examined the patient. I have reviewed the patient's H&P dated: There are no significant changes.     Ready For Surgery From Anesthesia Perspective.     .

## 2023-08-20 NOTE — HPI
49 yo male with hx of daily ETOH use for years (12 pack beer /day for over 5 years) is admitted with c/o daily bright red hematochezia for the past 2 weeks. He denies abdominal pain, N/V, hematemesis or melena. He had Hgb 6 on admission and has received 1 unit RBC's with repeat hgb 6 and just completed 2nd unit of blood. Hemodynamics have been stable. Liver tests noted to be abnormal with Tbili 5.5, albumin 1.8, AST 56, ALT 36. He has previously been told he had fatty liver but not aware of any other liver problems. He has never had endoscopy. He denies previous hx of blood transfusion or IVDA.

## 2023-08-20 NOTE — SUBJECTIVE & OBJECTIVE
Past Medical History:   Diagnosis Date    Fatty liver     History of alcohol abuse     Hypertension     Mixed hyperlipidemia     Thyroid disease        Past Surgical History:   Procedure Laterality Date    ABDOMINAL SURGERY         Review of patient's allergies indicates:  No Known Allergies  Family History    None       Tobacco Use    Smoking status: Never    Smokeless tobacco: Never   Substance and Sexual Activity    Alcohol use: Not Currently    Drug use: Never    Sexual activity: Not on file     Review of Systems   Constitutional:  Negative for chills, fever and unexpected weight change.   HENT:  Negative for sore throat and trouble swallowing.    Respiratory:  Negative for cough, shortness of breath and wheezing.    Cardiovascular:  Negative for chest pain.   Gastrointestinal:  Positive for blood in stool. Negative for abdominal pain, nausea, rectal pain and vomiting.   Genitourinary:  Negative for dysuria, flank pain and hematuria.   Neurological:  Negative for seizures, syncope and speech difficulty.   Psychiatric/Behavioral:  Negative for behavioral problems and confusion.      Objective:     Vital Signs (Most Recent):  Temp: 98.5 °F (36.9 °C) (08/20/23 0945)  Pulse: 83 (08/20/23 0945)  Resp: 12 (08/20/23 0945)  BP: (!) 148/68 (08/20/23 0945)  SpO2: 96 % (08/20/23 0945) Vital Signs (24h Range):  Temp:  [98.2 °F (36.8 °C)-99 °F (37.2 °C)] 98.5 °F (36.9 °C)  Pulse:  [] 83  Resp:  [9-27] 12  SpO2:  [96 %-100 %] 96 %  BP: (120-187)/(56-90) 148/68     Weight: (!) 140.2 kg (309 lb) (08/19/23 1738)  Body mass index is 51.42 kg/m².      Intake/Output Summary (Last 24 hours) at 8/20/2023 1028  Last data filed at 8/20/2023 0236  Gross per 24 hour   Intake 340 ml   Output --   Net 340 ml       Lines/Drains/Airways       Peripheral Intravenous Line  Duration                  Peripheral IV - Single Lumen 08/19/23 1919 20 G Right Antecubital <1 day         Peripheral IV - Single Lumen 08/20/23 0805 20 G  Left;Posterior Hand <1 day                     Physical Exam  Vitals and nursing note reviewed. Exam conducted with a chaperone present.   Constitutional:       General: He is not in acute distress.     Appearance: He is obese. He is not ill-appearing, toxic-appearing or diaphoretic.   HENT:      Head: Normocephalic and atraumatic.      Mouth/Throat:      Pharynx: Oropharynx is clear.   Eyes:      General: Scleral icterus present.      Extraocular Movements: Extraocular movements intact.   Cardiovascular:      Rate and Rhythm: Normal rate and regular rhythm.      Pulses: Normal pulses.      Heart sounds: Normal heart sounds.   Pulmonary:      Effort: Pulmonary effort is normal. No respiratory distress.      Breath sounds: Normal breath sounds.   Abdominal:      General: Bowel sounds are normal. There is no distension.      Palpations: Abdomen is soft. There is no mass.      Tenderness: There is no abdominal tenderness.   Musculoskeletal:      Right lower leg: No edema.      Left lower leg: No edema.   Skin:     General: Skin is warm and dry.   Neurological:      General: No focal deficit present.      Mental Status: He is oriented to person, place, and time.      Cranial Nerves: No cranial nerve deficit.      Motor: No weakness.   Psychiatric:         Mood and Affect: Mood normal.         Behavior: Behavior normal.         Thought Content: Thought content normal.          Significant Labs:  Recent Lab Results  (Last 5 results in the past 24 hours)        08/20/23  0448   08/20/23  0245   08/19/23  2218   08/19/23  2136   08/19/23  1934        Unit Blood Type Code     5100                5100  [P]           Unit Expiration     [id number]                [id number]  [P]           Albumin/Globulin Ratio 0.4               Albumin 1.8               Alkaline Phosphatase 171               ALT 36               Anion Gap 8               Aniso 1+               Appearance, UA         Clear       AST 56               Bands  1               Baso # 0.04               Basophil % 0.7                2               Bilirubin (UA)         Negative       BILIRUBIN TOTAL 5.5               BUN 10               BUN/CREAT RATIO 10               Calcium 7.5               Chloride 102               CO2 28               Color, UA         Yellow       ID NOW COVID-19, (BARBARA)   Negative             Creatinine 1.04               Crossmatch Interpretation     Compatible                Compatible  [P]           Differential Type Manual               DISPENSE STATUS     Transfused                Issued  [P]           eGFR 87               Eos # 0.36               Eosinophil % 6.1                5               Fecal Occult Blood       Negative         Ferritin 31               Folate 4.7               Globulin, Total 5.0               Glucose 94               Glucose, UA         Normal       Group & Rh     O POS           Hematocrit 18.6               Hemoglobin 6.0               Immature Grans (Abs) 0.03               Immature Granulocytes 0.5               INDIRECT YAO     NEG           Iron 18               Iron Saturation 7               Ketones, UA         Negative       Leukocytes, UA         Negative       Lymph # 1.20               Lymph % 20.3                27               MCH 30.6               MCHC 32.3               MCV 94.9               Mono # 1.25               Mono % 21.2                22               MPV 12.3               Neutrophils, Abs 3.02               Neutrophils Relative 51.2               NITRITE UA         Negative       nRBC 0.0               NUCLEATED RBC ABSOLUTE 0.00               Occult Blood UA         Negative       Ovalocytes Few               pH, UA         6.0       PLATELET MORPHOLOGY Decreased               Platelets 71               Potassium 2.7               Product Code     A0492X47                [id number]  [P]           PROTEIN TOTAL 6.8               Protein, UA         Negative       RBC 1.96                RDW 19.9               Segmented Neutrophils, Man % 43               Sodium 135               Specific [place], UA         1.013       Specimen Outdate     08/22/2023 23:59           TIBC 266               Unit ABO/Rh     O POS                O POS  [P]           UNIT NUMBER     [id number]                [id number]  [P]           UROBILINOGEN UA         6       Vitamin B-12 1,083               WBC 5.90                                       [P] - Preliminary Result               Significant Imaging:  Imaging results within the past 24 hours have been reviewed.
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
## 2023-08-20 NOTE — ANESTHESIA POSTPROCEDURE EVALUATION
Anesthesia Post Evaluation    Patient: John Spears    Procedure(s) Performed: Procedure(s) (LRB):  EXAM UNDER ANESTHESIA, DIGITAL, RECTUM; possible hemorrhoidectomy (N/A)    Final Anesthesia Type: general      Patient location during evaluation: PACU  Patient participation: Yes- Able to Participate  Level of consciousness: awake and sedated  Post-procedure vital signs: reviewed and stable  Pain management: adequate  Airway patency: patent    PONV status at discharge: No PONV  Anesthetic complications: no      Cardiovascular status: blood pressure returned to baseline  Respiratory status: unassisted  Hydration status: euvolemic  Follow-up not needed.          Vitals Value Taken Time   /50 08/20/23 1800   Temp 98 08/20/23 1803   Pulse 90 08/20/23 1803   Resp 9 08/20/23 1803   SpO2 96 % 08/20/23 1802   Vitals shown include unvalidated device data.      No case tracking events are documented in the log.      Pain/Phoebe Score: Phoebe Score: 9 (8/20/2023  5:40 PM)
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
## 2023-08-20 NOTE — ASSESSMENT & PLAN NOTE
To the OR for rectal exam under anesthesia with possible hemorrhoidectomy.      Risks and benefits explained with the patient including risks for infection, bleeding, injury to surrounding structures, hematoma/seroma formation with need for possible evacuation, possible open.  The patient verbalized understanding, agrees and wishes to proceed with surgery.    Hemoglobin 6.1; currently receiving 2nd unit of packed red blood cells.

## 2023-08-20 NOTE — ED PROVIDER NOTES
Encounter Date: 8/19/2023       History     Chief Complaint   Patient presents with    Leg Swelling    Rectal Bleeding     49 Y/O MALE WITH BILATERAL LOWER EXTREMITY EDEMA.        Review of patient's allergies indicates:  No Known Allergies  Past Medical History:   Diagnosis Date    Fatty liver     History of alcohol abuse     Hypertension     Mixed hyperlipidemia     Thyroid disease      Past Surgical History:   Procedure Laterality Date    ABDOMINAL SURGERY      DIGITAL RECTAL EXAMINATION UNDER ANESTHESIA N/A 8/20/2023    Procedure: EXAM UNDER ANESTHESIA, DIGITAL, RECTUM;  Surgeon: Michael Gusman DO;  Location: [facility] OR;  Service: General;  Laterality: N/A;    DIGITAL RECTAL EXAMINATION UNDER ANESTHESIA N/A 8/24/2023    Procedure: EXAM UNDER ANESTHESIA, DIGITAL, RECTUM;  Surgeon: Michael Gusman DO;  Location: [facility] OR;  Service: General;  Laterality: N/A;    EXCISIONAL HEMORRHOIDECTOMY N/A 8/20/2023    Procedure: HEMORRHOIDECTOMY;  Surgeon: Michael Gusman DO;  Location: [facility] OR;  Service: General;  Laterality: N/A;    EXCISIONAL HEMORRHOIDECTOMY N/A 8/24/2023    Procedure: HEMORRHOIDECTOMY;  Surgeon: Michael Gusman DO;  Location: [facility] OR;  Service: General;  Laterality: N/A;    RETURN TO OPERATING ROOM, FOR MINOR POSTOPERATIVE HEMORRHAGE CONTROL  8/24/2023    Procedure: RETURN TO OPERATING ROOM, FOR MINOR POSTOPERATIVE HEMORRHAGE CONTROL;  Surgeon: Michael Gusman DO;  Location: [facility];  Service: General;;     History reviewed. No pertinent family history.  Social History     Tobacco Use    Smoking status: Never    Smokeless tobacco: Never   Substance Use Topics    Alcohol use: Not Currently     Comment: recently quit beer and liquor as of 8/15/23 stated per pt    Drug use: Never     Review of Systems   All other systems reviewed and are negative.      Physical Exam     Initial Vitals [08/19/23 1738]   BP Pulse Resp Temp SpO2   138/67 90 16 98.2 °F (36.8 °C) 98 %      MAP        --         Physical Exam    Nursing note and vitals reviewed.  Constitutional: He appears well-developed and well-nourished.   HENT:   Head: Normocephalic and atraumatic.   Nose: Nose normal.   Mouth/Throat: Oropharynx is clear and moist.   Eyes: Conjunctivae and EOM are normal. Pupils are equal, round, and reactive to light.   Neck: Neck supple.   Normal range of motion.  Cardiovascular:  Normal rate, regular rhythm and normal heart sounds.           Pulmonary/Chest: Breath sounds normal.   Abdominal: Abdomen is soft. Bowel sounds are normal. He exhibits distension.   Musculoskeletal:         General: Edema present. Normal range of motion.      Cervical back: Normal range of motion and neck supple.     Neurological: He is alert and oriented to person, place, and time. He has normal strength. GCS score is 15. GCS eye subscore is 4. GCS verbal subscore is 5. GCS motor subscore is 6.   Skin: Skin is warm and dry. Capillary refill takes less than 2 seconds.   Psychiatric: He has a normal mood and affect.         Medical Screening Exam   See Full Note    ED Course   Procedures  Labs Reviewed   URINALYSIS, REFLEX TO URINE CULTURE - Abnormal; Notable for the following components:       Result Value    Urobilinogen, UA 6 (*)     All other components within normal limits   COMPREHENSIVE METABOLIC PANEL - Abnormal; Notable for the following components:    Sodium 134 (*)     Potassium 2.8 (*)     Calcium 7.9 (*)     Albumin 2.1 (*)     Globulin 5.4 (*)     Bilirubin, Total 6.5 (*)     Alk Phos 166 (*)     AST 61 (*)     All other components within normal limits   NT-PRO NATRIURETIC PEPTIDE - Abnormal; Notable for the following components:    ProBNP 255 (*)     All other components within normal limits   PROTIME-INR - Abnormal; Notable for the following components:    PT 21.2 (*)     All other components within normal limits   APTT - Abnormal; Notable for the following components:    PTT 48.2 (*)     All other components  within normal limits   CBC WITH DIFFERENTIAL - Abnormal; Notable for the following components:    RBC 1.93 (*)     Hemoglobin 6.1 (*)     Hematocrit 19.0 (*)     MCV 98.4 (*)     MCH 31.6 (*)     RDW 18.9 (*)     Platelet Count 78 (*)     MPV 12.6 (*)     Lymphocytes % 17.7 (*)     Monocytes % 19.1 (*)     Eosinophils % 6.2 (*)     Immature Granulocytes % 0.5 (*)     Monocytes, Absolute 1.08 (*)     All other components within normal limits   COMPREHENSIVE METABOLIC PANEL - Abnormal; Notable for the following components:    Sodium 135 (*)     Potassium 2.7 (*)     Calcium 7.5 (*)     Albumin 1.8 (*)     Globulin 5.0 (*)     Bilirubin, Total 5.5 (*)     Alk Phos 171 (*)     AST 56 (*)     All other components within normal limits   CBC WITH DIFFERENTIAL - Abnormal; Notable for the following components:    RBC 1.96 (*)     Hemoglobin 6.0 (*)     Hematocrit 18.6 (*)     RDW 19.9 (*)     Platelet Count 71 (*)     Neutrophils % 51.2 (*)     Lymphocytes % 20.3 (*)     Monocytes % 21.2 (*)     Eosinophils % 6.1 (*)     Immature Granulocytes % 0.5 (*)     Monocytes, Absolute 1.25 (*)     All other components within normal limits   IRON AND TIBC - Abnormal; Notable for the following components:    Iron 18 (*)     Iron Saturation 7 (*)     All other components within normal limits   VITAMIN B12/FOLATE, SERUM PANEL - Abnormal; Notable for the following components:    Vitamin B12 1,083 (*)     All other components within normal limits   MANUAL DIFFERENTIAL - Abnormal; Notable for the following components:    Segmented Neutrophils, Man % 43 (*)     Monocytes, Man % 22 (*)     Eosinophils, Man % 5 (*)     Basophils, Man % 2 (*)     Platelet Morphology Decreased (*)     All other components within normal limits   HEMOGLOBIN AND HEMATOCRIT, BLOOD - Abnormal; Notable for the following components:    Hemoglobin 7.2 (*)     Hematocrit 21.8 (*)     All other components within normal limits   TROPONIN I - Normal   MAGNESIUM - Normal    SARS-COV-2 RNA AMPLIFICATION, QUAL - Normal    Narrative:     Negative SARS-CoV results should not be used as the sole basis for treatment or patient management decisions; negative results should be considered in the context of a patient's recent exposures, history and the presene of clinical signs and symptoms consistent with COVID-19.  Negative results should be treated as presumptive and confirmed by molecular assay, if necessary for patient management.   FERRITIN - Normal   POCT OCCULT BLOOD (STOOL) - Normal   CBC W/ AUTO DIFFERENTIAL    Narrative:     The following orders were created for panel order CBC auto differential.  Procedure                               Abnormality         Status                     ---------                               -----------         ------                     CBC with Differential[[id number]]        Abnormal            Final result                 Please view results for these tests on the individual orders.   CBC W/ AUTO DIFFERENTIAL    Narrative:     The following orders were created for panel order CBC with Automated Differential.  Procedure                               Abnormality         Status                     ---------                               -----------         ------                     CBC with Differential[[id number]]        Abnormal            Final result               Manual Differential[[id number]]          Abnormal            Final result                 Please view results for these tests on the individual orders.   EXTRA TUBES    Narrative:     The following orders were created for panel order EXTRA TUBES.  Procedure                               Abnormality         Status                     ---------                               -----------         ------                     Lavender Top Hold[[id number]]                                In process                   Please view results for these tests on the individual orders.   LAVENDER TOP HOLD    TYPE & SCREEN   ABORH RETYPE   PREPARE RBC SOFT          Imaging Results    None          Medications   sodium chloride 0.9% flush 10 mL (has no administration in time range)   naloxone 0.4 mg/mL injection 0.02 mg (has no administration in time range)   acetaminophen tablet 650 mg (has no administration in time range)   ondansetron injection 4 mg (has no administration in time range)   melatonin tablet 6 mg (6 mg Oral Given 8/21/23 2206)   simethicone chewable tablet 80 mg (has no administration in time range)   LORazepam injection 1 mg (1 mg Intravenous Given 9/3/23 0240)   thiamine (B-1) 500 mg in dextrose 5 % (D5W) 100 mL IVPB (0 mg Intravenous Stopped 9/3/23 1523)   folic acid tablet 1 mg (1 mg Oral Given 9/3/23 0935)   0.9%  NaCl infusion (0 mL/hr Intravenous Stopped 8/20/23 1443)   ondansetron injection 4 mg (has no administration in time range)   prochlorperazine injection Soln 5 mg (has no administration in time range)   HYDROmorphone (PF) injection 1 mg (1 mg Intravenous Given 9/2/23 0222)   LIDOcaine HCl 2% urojet (has no administration in time range)   oxyCODONE-acetaminophen  mg per tablet 1 tablet (1 tablet Oral Given 9/2/23 2026)   spironolactone tablet 25 mg (25 mg Oral Given 9/3/23 0935)   furosemide injection 20 mg (20 mg Intravenous Given 9/3/23 0934)   rifAXIMin tablet 550 mg (550 mg Oral Given 9/3/23 0935)   pantoprazole injection 40 mg (40 mg Intravenous Given 9/3/23 0936)   lactulose 20 gram/30 mL solution Soln 10 g (10 g Oral Given 9/3/23 0935)   propranoloL tablet 10 mg (10 mg Oral Given 9/3/23 0935)   0.9%  NaCl infusion (for blood administration) ( Intravenous New Bag 9/1/23 0058)   0.9%  NaCl infusion (for blood administration) (has no administration in time range)   0.9%  NaCl infusion (for blood administration) ( Intravenous New Bag 9/3/23 1457)   potassium chloride SA CR tablet 40 mEq (40 mEq Oral Given 8/19/23 2245)   phytonadione vitamin k (AQUA-MEPHYTON) 2.5 mg in dextrose 5  % (D5W) 50 mL IVPB (0 mg Intravenous Stopped 8/20/23 0652)   potassium chloride 10 mEq in 100 mL IVPB (0 mEq Intravenous Stopped 8/20/23 1107)   potassium chloride SA CR tablet 40 mEq (40 mEq Oral Given 8/20/23 0841)   BUPivacaine liposome (PF) 1.3 % (13.3 mg/mL) suspension 13.3 mg (13.3 mg Infiltration Given 8/20/23 1800)   chlordiazepoxide capsule 15 mg (15 mg Oral Given 8/22/23 0613)   perflutren lipid microspheres injection 1.5 mL (1.5 mLs Intravenous Given 8/21/23 0831)   chlordiazepoxide capsule 5 mg (5 mg Oral Given 8/23/23 2101)   chlordiazepoxide capsule 10 mg (10 mg Oral Given 8/22/23 2207)   phytonadione vitamin k (AQUA-MEPHYTON) 10 mg in dextrose 5 % (D5W) 50 mL IVPB (0 mg Intravenous Stopped 8/23/23 2154)   BUPivacaine liposome (PF) 1.3 % (13.3 mg/mL) suspension 266 mg (266 mg Injection Given 8/24/23 1801)   potassium chloride SA CR tablet 40 mEq (40 mEq Oral Given 8/28/23 0903)   potassium chloride SA CR tablet 40 mEq (40 mEq Oral Given 8/29/23 2126)   phytonadione vitamin k (AQUA-MEPHYTON) 5 mg in dextrose 5 % (D5W) 50 mL IVPB (0 mg Intravenous Stopped 8/29/23 1923)   phytonadione vitamin k (AQUA-MEPHYTON) 5 mg in dextrose 5 % (D5W) 50 mL IVPB (0 mg Intravenous Stopped 9/2/23 2111)     Medical Decision Making  EDEMA.  DDX:  LOW SERUM PROTEIN VS KIDNEY DYSFUNCTION VS CHF VS LYMPHEDEMA VS OTHER    Amount and/or Complexity of Data Reviewed  Labs: ordered. Decision-making details documented in ED Course.  Discussion of management or test interpretation with external provider(s): DX:  ANEMIA, LOW ALBUMIN.  ADMIT.      Risk  Prescription drug management.  Decision regarding hospitalization.                               Clinical Impression:   Final diagnoses:  [R60.9] Edema (Primary)  [E88.09] Hypoalbuminemia  [D64.9] Anemia, unspecified type  [K64.9] Bleeding hemorrhoid        ED Disposition Condition    Admit Stable                Gustabo Jaquez MD  09/03/23 2884

## 2023-08-20 NOTE — H&P
[facility] - Emergency Department  [facility] Medicine  History & Physical    Patient Name: John Spears  MRN: [id number]  Patient Class: IP- Inpatient  Admission Date: 8/19/2023  Attending Physician: Macy Varner MD   Primary Care Provider: Romel Miranda         Patient information was obtained from patient, past medical records and ER records.     Subjective:     Principal Problem:Bleeding hemorrhoid    Chief Complaint:   Chief Complaint   Patient presents with    Leg Swelling    Rectal Bleeding        HPI: Patient is a 47 y/o male with PMH of HTN, HLD, cirrhosis of the liver and hemorrhoids who presents to the ED with complaint of COB and swelling of the abdomen, legs and feet that started about 1 month ago. Patient states that the swelling and SOB has been worsening for the last 2 weeks. Patient reports passing dark blood per rectum with bowel movement for the last 2 weeks. Patient reports having intermittent RUQ when he eats fried foot. Patient reports chills but no fever. Patient denies nausea, vomiting, chest pain or urinary changes. Patient reports drinking about 10 beers of 16 ounces per day for the 3 years and for the last months he stopped drinking beer and started drinking  liquor. He states that 1 fifth of liquor lasts for 3 days. Patient reports withdrawal symptoms in the past. Patient reports that he quit smoking cigarettes 5 years ago.      In the ED showed /67, HR 90, RR 16, SpO2 98% and afebrile. Blood work showed H/H 6.1/19, WBC 5.6, PLT 78, PT 21.2, INR 1.87, PTT 48.2. Sodium 134, potassium 2.8, Alkaline phosphatase 166, Total bilirrubin 6.5, AST/ALST 61/41, p-, troponin 16.7. UA negative for infection or blood. FOBT negative. EKG nsr with HR 89. Patient is receiving PRBC in the ER at this moment and electrolytes are being replenished. Patient will be admitted to the hospital for further management.       Past Medical History:   Diagnosis Date    Fatty liver      History of alcohol abuse     Hypertension     Mixed hyperlipidemia     Thyroid disease        Past Surgical History:   Procedure Laterality Date    ABDOMINAL SURGERY         Review of patient's allergies indicates:  No Known Allergies    No current facility-administered medications on file prior to encounter.     Current Outpatient Medications on File Prior to Encounter   Medication Sig    atenoloL-chlorthalidone (TENORETIC) 50-25 mg Tab Take 1 tablet by mouth.    rosuvastatin (CRESTOR) 5 MG tablet Take 5 mg by mouth.     Family History    None       Tobacco Use    Smoking status: Never    Smokeless tobacco: Never   Substance and Sexual Activity    Alcohol use: Not Currently    Drug use: Never    Sexual activity: Not on file     Review of Systems   Constitutional:  Negative for chills and fever.   HENT:  Negative for congestion and rhinorrhea.    Eyes:  Negative for photophobia and visual disturbance.   Respiratory:  Positive for shortness of breath. Negative for cough.    Cardiovascular:  Positive for leg swelling. Negative for chest pain.   Gastrointestinal:  Positive for abdominal distention and blood in stool. Negative for diarrhea, nausea and vomiting.   Genitourinary:  Negative for decreased urine volume and dysuria.   Musculoskeletal:  Negative for arthralgias.   Skin:  Negative for wound.   Neurological:  Negative for dizziness and seizures.   Psychiatric/Behavioral:  Negative for agitation and hallucinations. The patient is not nervous/anxious.      Objective:     Vital Signs (Most Recent):  Temp: 98.7 °F (37.1 °C) (08/20/23 0020)  Pulse: 89 (08/20/23 0020)  Resp: 20 (08/20/23 0020)  BP: (!) 134/58 (08/20/23 0020)  SpO2: 99 % (08/20/23 0020) Vital Signs (24h Range):  Temp:  [98.2 °F (36.8 °C)-98.9 °F (37.2 °C)] 98.7 °F (37.1 °C)  Pulse:  [] 89  Resp:  [12-27] 20  SpO2:  [98 %-100 %] 99 %  BP: (134-187)/(58-90) 134/58     Weight: (!) 140.2 kg (309 lb)  Body mass index is 51.42 kg/m².     Physical  Exam  Vitals and nursing note reviewed.   Constitutional:       General: He is not in acute distress.     Appearance: He is obese. He is ill-appearing. He is not toxic-appearing or diaphoretic.   HENT:      Head: Normocephalic and atraumatic.      Right Ear: External ear normal.      Left Ear: External ear normal.      Nose: Nose normal. No congestion or rhinorrhea.      Mouth/Throat:      Mouth: Mucous membranes are dry.      Pharynx: Oropharynx is clear. No oropharyngeal exudate or posterior oropharyngeal erythema.   Eyes:      General: Scleral icterus present.      Extraocular Movements: Extraocular movements intact.      Conjunctiva/sclera: Conjunctivae normal.      Pupils: Pupils are equal, round, and reactive to light.   Cardiovascular:      Rate and Rhythm: Normal rate and regular rhythm.      Pulses: Normal pulses.      Heart sounds: Normal heart sounds. No murmur heard.  Pulmonary:      Effort: Pulmonary effort is normal. No respiratory distress.      Breath sounds: No wheezing, rhonchi or rales.   Abdominal:      General: Abdomen is flat. Bowel sounds are normal. There is distension.      Palpations: Abdomen is soft.      Tenderness: There is no abdominal tenderness. There is no right CVA tenderness, left CVA tenderness, guarding or rebound.   Musculoskeletal:         General: Swelling present. Normal range of motion.      Cervical back: Neck supple. No rigidity or tenderness.      Right lower leg: Edema present.      Left lower leg: Edema present.   Skin:     General: Skin is warm and dry.      Capillary Refill: Capillary refill takes less than 2 seconds.      Findings: No lesion or rash.   Neurological:      General: No focal deficit present.      Mental Status: He is alert and oriented to person, place, and time.      Cranial Nerves: No cranial nerve deficit.      Sensory: No sensory deficit.      Motor: No weakness.   Psychiatric:         Mood and Affect: Mood normal.         Behavior: Behavior normal.          Thought Content: Thought content normal.              CRANIAL NERVES     CN III, IV, VI   Pupils are equal, round, and reactive to light.       Significant Labs: All pertinent labs within the past 24 hours have been reviewed.    Significant Imaging: I have reviewed all pertinent imaging results/findings within the past 24 hours.    Assessment/Plan:     * Bleeding hemorrhoid  Likely secondary to liver cirrhosis  H/H 6.1/19 on admission  Patient receiving 1 unit of PRBC in the ED at this time  Monitor H/H Q6h  Transfuse accordingly  Consult GI      Alcoholic cirrhosis of liver  MELD score 22 points. 19.6% with estimated 3 month mortality  CIWA score 0  Last drink was 5 days ago   IV Ativan 1 mg Q1H PRN  Monitor AM CMP  Monitor signs of withdrawal         Anemia  Acute blood loss   Iron studies, vitamin B12/folate pending      Hypertension  Continue home BB and HCTZ  Monitor BP    Hypoalbuminemia  Likely from malturition + or - synthetic function of the liver          VTE Risk Mitigation (From admission, onward)           Ordered     Reason for No Pharmacological VTE Prophylaxis  Once        Question:  Reasons:  Answer:  Active Bleeding    08/20/23 0043     IP VTE HIGH RISK PATIENT  Once         08/20/23 0043     Place sequential compression device  Until discontinued         08/20/23 0043                               Delroy Leigh MD  Department of Hospital Medicine  [facility] - Emergency Department

## 2023-08-20 NOTE — SUBJECTIVE & OBJECTIVE
No current facility-administered medications on file prior to encounter.     Current Outpatient Medications on File Prior to Encounter   Medication Sig    atenoloL-chlorthalidone (TENORETIC) 50-25 mg Tab Take 1 tablet by mouth.    rosuvastatin (CRESTOR) 5 MG tablet Take 5 mg by mouth.       Review of patient's allergies indicates:  No Known Allergies    Past Medical History:   Diagnosis Date    Fatty liver     History of alcohol abuse     Hypertension     Mixed hyperlipidemia     Thyroid disease      Past Surgical History:   Procedure Laterality Date    ABDOMINAL SURGERY       Family History    None       Tobacco Use    Smoking status: Never    Smokeless tobacco: Never   Substance and Sexual Activity    Alcohol use: Not Currently    Drug use: Never    Sexual activity: Not on file     Review of Systems   Constitutional: Negative.  Negative for appetite change, chills, fever and unexpected weight change.   HENT: Negative.  Negative for trouble swallowing.    Eyes: Negative.    Respiratory: Negative.  Negative for chest tightness and shortness of breath.    Cardiovascular: Negative.  Negative for chest pain.   Gastrointestinal:  Positive for anal bleeding and blood in stool. Negative for abdominal distention, abdominal pain and nausea.   Endocrine: Negative.    Genitourinary: Negative.  Negative for hematuria.   Musculoskeletal: Negative.  Negative for back pain and myalgias.   Skin: Negative.  Negative for color change.   Allergic/Immunologic: Negative.    Neurological: Negative.  Negative for dizziness, syncope, weakness and light-headedness.   Psychiatric/Behavioral: Negative.  Negative for agitation.      Objective:     Vital Signs (Most Recent):  Temp: 98.8 °F (37.1 °C) (08/20/23 0745)  Pulse: 93 (08/20/23 0745)  Resp: 13 (08/20/23 0745)  BP: 128/66 (08/20/23 0745)  SpO2: 99 % (08/20/23 0745) Vital Signs (24h Range):  Temp:  [98.2 °F (36.8 °C)-99 °F (37.2 °C)] 98.8 °F (37.1 °C)  Pulse:  [] 93  Resp:  [9-27]  13  SpO2:  [98 %-100 %] 99 %  BP: (120-187)/(56-90) 128/66     Weight: (!) 140.2 kg (309 lb)  Body mass index is 51.42 kg/m².     Physical Exam  Constitutional:       General: He is not in acute distress.     Appearance: Normal appearance.   HENT:      Head: Normocephalic.   Eyes:      General: Scleral icterus present.   Cardiovascular:      Rate and Rhythm: Normal rate.   Pulmonary:      Effort: Pulmonary effort is normal. No respiratory distress.   Abdominal:      General: There is no distension.      Tenderness: There is no abdominal tenderness.   Genitourinary:     Rectum: Internal hemorrhoid present.      Comments: No digital rectal exam performed due to patient request; no blood seen on the external anus  Musculoskeletal:         General: Normal range of motion.   Skin:     General: Skin is warm.      Coloration: Skin is not jaundiced.   Neurological:      General: No focal deficit present.      Mental Status: He is alert and oriented to person, place, and time.      Cranial Nerves: No cranial nerve deficit.            I have reviewed all pertinent lab results within the past 24 hours.  CBC:   Recent Labs   Lab 08/20/23  0448   WBC 5.90   RBC 1.96*   HGB 6.0*   HCT 18.6*   PLT 71*   MCV 94.9   MCH 30.6   MCHC 32.3     BMP:   Recent Labs   Lab 08/19/23  1916 08/20/23  0448    94   * 135*   K 2.8* 2.7*    102   CO2 29 28   BUN 10 10   CREATININE 1.11 1.04   CALCIUM 7.9* 7.5*   MG 2.2  --        Significant Diagnostics:  I have reviewed all pertinent imaging results/findings within the past 24 hours.

## 2023-08-20 NOTE — PLAN OF CARE
1740 pt to pacu pt resp reg and non labored pt dsg d/I to pernium no bleeding noted pt sleepy pt awakens easily pt voices no complaints at present pt eyes jaundice bilaterally pt with hx fatty liver    1800 pt vs stable pt resting well pt voices no complaints pain level 0 at present iv to l hand pulled out accicently 20g jelco intact pt has 20g jelco intact to r ac started fluids to that iv    1815 pt vs stable pt resting well pt sao2 99% on ra pt pain level 0 orders to release to room per md    1830 pt released to guy krishna rn b/p 147/74 pulse 90 resp 16 sao2 94% on ra pt awake and alert family at bedside

## 2023-08-20 NOTE — ASSESSMENT & PLAN NOTE
Pt stable at present. Has iron deficiency. This may well be rectal varices though consider other causes including malignancy. Continue serial Hgb's and blood product support as needed. He will need colonoscopy at some point. Surgery is taking him to OR this afternoon for proctoscopy eval. Would plan colonoscopy tomorrow if no bleeding cause identified.

## 2023-08-20 NOTE — ASSESSMENT & PLAN NOTE
MELD score 22 points. 19.6% with estimated 3 month mortality  CIWA score 0  Last drink was 5 days ago   IV Ativan 1 mg Q1H PRN  Monitor AM CMP  Monitor signs of withdrawal

## 2023-08-20 NOTE — SUBJECTIVE & OBJECTIVE
Past Medical History:   Diagnosis Date    Fatty liver     History of alcohol abuse     Hypertension     Mixed hyperlipidemia     Thyroid disease        Past Surgical History:   Procedure Laterality Date    ABDOMINAL SURGERY         Review of patient's allergies indicates:  No Known Allergies    No current facility-administered medications on file prior to encounter.     Current Outpatient Medications on File Prior to Encounter   Medication Sig    atenoloL-chlorthalidone (TENORETIC) 50-25 mg Tab Take 1 tablet by mouth.    rosuvastatin (CRESTOR) 5 MG tablet Take 5 mg by mouth.     Family History    None       Tobacco Use    Smoking status: Never    Smokeless tobacco: Never   Substance and Sexual Activity    Alcohol use: Not Currently    Drug use: Never    Sexual activity: Not on file     Review of Systems   Constitutional:  Negative for chills and fever.   HENT:  Negative for congestion and rhinorrhea.    Eyes:  Negative for photophobia and visual disturbance.   Respiratory:  Positive for shortness of breath. Negative for cough.    Cardiovascular:  Positive for leg swelling. Negative for chest pain.   Gastrointestinal:  Positive for abdominal distention and blood in stool. Negative for diarrhea, nausea and vomiting.   Genitourinary:  Negative for decreased urine volume and dysuria.   Musculoskeletal:  Negative for arthralgias.   Skin:  Negative for wound.   Neurological:  Negative for dizziness and seizures.   Psychiatric/Behavioral:  Negative for agitation and hallucinations. The patient is not nervous/anxious.      Objective:     Vital Signs (Most Recent):  Temp: 98.7 °F (37.1 °C) (08/20/23 0020)  Pulse: 89 (08/20/23 0020)  Resp: 20 (08/20/23 0020)  BP: (!) 134/58 (08/20/23 0020)  SpO2: 99 % (08/20/23 0020) Vital Signs (24h Range):  Temp:  [98.2 °F (36.8 °C)-98.9 °F (37.2 °C)] 98.7 °F (37.1 °C)  Pulse:  [] 89  Resp:  [12-27] 20  SpO2:  [98 %-100 %] 99 %  BP: (134-187)/(58-90) 134/58     Weight: (!) 140.2 kg  (309 lb)  Body mass index is 51.42 kg/m².     Physical Exam  Vitals and nursing note reviewed.   Constitutional:       General: He is not in acute distress.     Appearance: He is obese. He is ill-appearing. He is not toxic-appearing or diaphoretic.   HENT:      Head: Normocephalic and atraumatic.      Right Ear: External ear normal.      Left Ear: External ear normal.      Nose: Nose normal. No congestion or rhinorrhea.      Mouth/Throat:      Mouth: Mucous membranes are dry.      Pharynx: Oropharynx is clear. No oropharyngeal exudate or posterior oropharyngeal erythema.   Eyes:      General: Scleral icterus present.      Extraocular Movements: Extraocular movements intact.      Conjunctiva/sclera: Conjunctivae normal.      Pupils: Pupils are equal, round, and reactive to light.   Cardiovascular:      Rate and Rhythm: Normal rate and regular rhythm.      Pulses: Normal pulses.      Heart sounds: Normal heart sounds. No murmur heard.  Pulmonary:      Effort: Pulmonary effort is normal. No respiratory distress.      Breath sounds: No wheezing, rhonchi or rales.   Abdominal:      General: Abdomen is flat. Bowel sounds are normal. There is distension.      Palpations: Abdomen is soft.      Tenderness: There is no abdominal tenderness. There is no right CVA tenderness, left CVA tenderness, guarding or rebound.   Musculoskeletal:         General: Swelling present. Normal range of motion.      Cervical back: Neck supple. No rigidity or tenderness.      Right lower leg: Edema present.      Left lower leg: Edema present.   Skin:     General: Skin is warm and dry.      Capillary Refill: Capillary refill takes less than 2 seconds.      Findings: No lesion or rash.   Neurological:      General: No focal deficit present.      Mental Status: He is alert and oriented to person, place, and time.      Cranial Nerves: No cranial nerve deficit.      Sensory: No sensory deficit.      Motor: No weakness.   Psychiatric:         Mood and  Affect: Mood normal.         Behavior: Behavior normal.         Thought Content: Thought content normal.              CRANIAL NERVES     CN III, IV, VI   Pupils are equal, round, and reactive to light.       Significant Labs: All pertinent labs within the past 24 hours have been reviewed.    Significant Imaging: I have reviewed all pertinent imaging results/findings within the past 24 hours.

## 2023-08-20 NOTE — OP NOTE
[facility] - Periop Services  Surgery Department  Operative Note    SUMMARY     Date of Procedure: 8/20/2023     Procedure: Procedure(s) (LRB):  EXAM UNDER ANESTHESIA, DIGITAL, RECTUM; possible hemorrhoidectomy (N/A)     Surgeon(s) and Role:     * Michael Gusman DO - Primary    Assisting Surgeon: None    Pre-Operative Diagnosis: Bleeding hemorrhoid [K64.9]    Post-Operative Diagnosis: Post-Op Diagnosis Codes:     * Bleeding hemorrhoid [K64.9]    Anesthesia: General    Operative Findings (including complications, if any):  Significant circumferential bleeding internal hemorrhoids; due to severity, placed multiple band ligation as well as hemorrhoidectomy with suture pedicle ligation, and fulguration with electrocautery    Description of Technical Procedures:  Patient was taken the operating room and placed on the operating table in the lithotomy position.  Patient underwent general anesthesia per the anesthesia team.  The rectum was then prepped and draped in usual sterile fashion.  A speculum was entered into the anus and we found significant circumferential large, grade 3 bleeding internal hemorrhoids.  There was no way we would be able to perform a hemorrhoidectomy on all the tissues and whenever we touched any tissue we had a large amount of bleeding.  At this time we elected to place band ligation on active bleeding spots which we did circumferentially and successfully; some of the bands did come off the other state on; we placed banding at the 7 o'clock position, the 5 o'clock position successfully.  We then used 2-0 Vicryl and ligated multiple pedicles at the 7 o'clock position, 5 o'clock position, 9 o'clock position and 6 o'clock position.  We used electrocautery to fulgurate any protruding vessels and did this successfully circumferentially.  We irrigated the cavity and suctioned clear.  Bleeding controlled with cautery.  We then soaked the Gelfoam and thrombin and inserted this into the anus  and then covered with 4 x 4 gauze and tape.  All Ray-Kirby sponges and needles were accounted for.  The procedure took an additional 60 minutes due to the bleeding; I will add a 22 modifier to the case.  Patient was awakened, extubated and taken the PACU in stable condition.  Patient tolerated procedure well.        Estimated Blood Loss (EBL): 100cc           Implants: * No implants in log *    Specimens:   Specimen (24h ago, onward)      None                    Condition: Good    Disposition: PACU - hemodynamically stable.    Attestation: I was present and scrubbed for the entire procedure.

## 2023-08-20 NOTE — ASSESSMENT & PLAN NOTE
Acute blood loss   Iron studies, vitamin B12/folate pending     Denzel Sierra)  Cardiac Electrophysiology; Cardiovascular Disease  [address] [id number]  Phone: [phone]  Fax: [phone]  Established Patient  Follow Up Time:

## 2023-08-20 NOTE — CONSULTS
[facility] - Emergency Department  Gastroenterology  Consult Note    Patient Name: John Spears  MRN: [id number]  Admission Date: 8/19/2023  Hospital Length of Stay: 0 days  Code Status: Full Code   Attending Provider: Macy Varner MD   Consulting Provider: IZABELLA Horton MD  Primary Care Physician: Ruben, Provider  Principal Problem:Bleeding hemorrhoid    Consults  Subjective:     HPI:  47 yo male with hx of daily ETOH use for years (12 pack beer /day for over 5 years) is admitted with c/o daily bright red hematochezia for the past 2 weeks. He denies abdominal pain, N/V, hematemesis or melena. He had Hgb 6 on admission and has received 1 unit RBC's with repeat hgb 6 and just completed 2nd unit of blood. Hemodynamics have been stable. Liver tests noted to be abnormal with Tbili 5.5, albumin 1.8, AST 56, ALT 36. He has previously been told he had fatty liver but not aware of any other liver problems. He has never had endoscopy. He denies previous hx of blood transfusion or IVDA.      Past Medical History:   Diagnosis Date    Fatty liver     History of alcohol abuse     Hypertension     Mixed hyperlipidemia     Thyroid disease        Past Surgical History:   Procedure Laterality Date    ABDOMINAL SURGERY         Review of patient's allergies indicates:  No Known Allergies  Family History    None       Tobacco Use    Smoking status: Never    Smokeless tobacco: Never   Substance and Sexual Activity    Alcohol use: Not Currently    Drug use: Never    Sexual activity: Not on file     Review of Systems   Constitutional:  Negative for chills, fever and unexpected weight change.   HENT:  Negative for sore throat and trouble swallowing.    Respiratory:  Negative for cough, shortness of breath and wheezing.    Cardiovascular:  Negative for chest pain.   Gastrointestinal:  Positive for blood in stool. Negative for abdominal pain, nausea, rectal pain and vomiting.   Genitourinary:  Negative  for dysuria, flank pain and hematuria.   Neurological:  Negative for seizures, syncope and speech difficulty.   Psychiatric/Behavioral:  Negative for behavioral problems and confusion.      Objective:     Vital Signs (Most Recent):  Temp: 98.5 °F (36.9 °C) (08/20/23 0945)  Pulse: 83 (08/20/23 0945)  Resp: 12 (08/20/23 0945)  BP: (!) 148/68 (08/20/23 0945)  SpO2: 96 % (08/20/23 0945) Vital Signs (24h Range):  Temp:  [98.2 °F (36.8 °C)-99 °F (37.2 °C)] 98.5 °F (36.9 °C)  Pulse:  [] 83  Resp:  [9-27] 12  SpO2:  [96 %-100 %] 96 %  BP: (120-187)/(56-90) 148/68     Weight: (!) 140.2 kg (309 lb) (08/19/23 1738)  Body mass index is 51.42 kg/m².      Intake/Output Summary (Last 24 hours) at 8/20/2023 1028  Last data filed at 8/20/2023 0236  Gross per 24 hour   Intake 340 ml   Output --   Net 340 ml       Lines/Drains/Airways       Peripheral Intravenous Line  Duration                  Peripheral IV - Single Lumen 08/19/23 1919 20 G Right Antecubital <1 day         Peripheral IV - Single Lumen 08/20/23 0805 20 G Left;Posterior Hand <1 day                     Physical Exam  Vitals and nursing note reviewed. Exam conducted with a chaperone present.   Constitutional:       General: He is not in acute distress.     Appearance: He is obese. He is not ill-appearing, toxic-appearing or diaphoretic.   HENT:      Head: Normocephalic and atraumatic.      Mouth/Throat:      Pharynx: Oropharynx is clear.   Eyes:      General: Scleral icterus present.      Extraocular Movements: Extraocular movements intact.   Cardiovascular:      Rate and Rhythm: Normal rate and regular rhythm.      Pulses: Normal pulses.      Heart sounds: Normal heart sounds.   Pulmonary:      Effort: Pulmonary effort is normal. No respiratory distress.      Breath sounds: Normal breath sounds.   Abdominal:      General: Bowel sounds are normal. There is no distension.      Palpations: Abdomen is soft. There is no mass.      Tenderness: There is no abdominal  tenderness.   Musculoskeletal:      Right lower leg: No edema.      Left lower leg: No edema.   Skin:     General: Skin is warm and dry.   Neurological:      General: No focal deficit present.      Mental Status: He is oriented to person, place, and time.      Cranial Nerves: No cranial nerve deficit.      Motor: No weakness.   Psychiatric:         Mood and Affect: Mood normal.         Behavior: Behavior normal.         Thought Content: Thought content normal.          Significant Labs:  Recent Lab Results  (Last 5 results in the past 24 hours)        08/20/23  0448   08/20/23  0245   08/19/23  2218   08/19/23  2136   08/19/23  1934        Unit Blood Type Code     5100                5100  [P]           Unit Expiration     [id number]                [id number]  [P]           Albumin/Globulin Ratio 0.4               Albumin 1.8               Alkaline Phosphatase 171               ALT 36               Anion Gap 8               Aniso 1+               Appearance, UA         Clear       AST 56               Bands 1               Baso # 0.04               Basophil % 0.7                2               Bilirubin (UA)         Negative       BILIRUBIN TOTAL 5.5               BUN 10               BUN/CREAT RATIO 10               Calcium 7.5               Chloride 102               CO2 28               Color, UA         Yellow       ID NOW COVID-19, (BARBARA)   Negative             Creatinine 1.04               Crossmatch Interpretation     Compatible                Compatible  [P]           Differential Type Manual               DISPENSE STATUS     Transfused                Issued  [P]           eGFR 87               Eos # 0.36               Eosinophil % 6.1                5               Fecal Occult Blood       Negative         Ferritin 31               Folate 4.7               Globulin, Total 5.0               Glucose 94               Glucose, UA         Normal       Group & Rh     O POS           Hematocrit 18.6                Hemoglobin 6.0               Immature Grans (Abs) 0.03               Immature Granulocytes 0.5               INDIRECT YAO     NEG           Iron 18               Iron Saturation 7               Ketones, UA         Negative       Leukocytes, UA         Negative       Lymph # 1.20               Lymph % 20.3                27               MCH 30.6               MCHC 32.3               MCV 94.9               Mono # 1.25               Mono % 21.2                22               MPV 12.3               Neutrophils, Abs 3.02               Neutrophils Relative 51.2               NITRITE UA         Negative       nRBC 0.0               NUCLEATED RBC ABSOLUTE 0.00               Occult Blood UA         Negative       Ovalocytes Few               pH, UA         6.0       PLATELET MORPHOLOGY Decreased               Platelets 71               Potassium 2.7               Product Code     [id number]                [id number]  [P]           PROTEIN TOTAL 6.8               Protein, UA         Negative       RBC 1.96               RDW 19.9               Segmented Neutrophils, Man % 43               Sodium 135               Specific [place], UA         1.013       Specimen Outdate     08/22/2023 23:59           TIBC 266               Unit ABO/Rh     O POS                O POS  [P]           UNIT NUMBER     [id number]                [id number]  [P]           UROBILINOGEN UA         6       Vitamin B-12 1,083               WBC 5.90                                       [P] - Preliminary Result               Significant Imaging:  Imaging results within the past 24 hours have been reviewed.    Assessment/Plan:     GI  Acute lower GI bleeding  Pt stable at present. Has iron deficiency. This may well be rectal varices though consider other causes including malignancy. Continue serial Hgb's and blood product support as needed. He will need colonoscopy at some point. Surgery is taking him to OR this afternoon for proctoscopy eval.  Would plan colonoscopy tomorrow if no bleeding cause identified.    Alcoholic cirrhosis of liver  Likely alcoholic cirrhosis by hx and clinical picture/ Recc imaging with ultrasound liver, serologic eval for other potential causes.He will need probable EGD as well for upper GI varices screening.        Thank you for your consult. Dr. Ng will assume care tomorrow.    IZABELLA Horton MD  Gastroenterology  [facility] - Emergency Department

## 2023-08-20 NOTE — ASSESSMENT & PLAN NOTE
Likely alcoholic cirrhosis by hx and clinical picture/ Recc imaging with ultrasound liver, serologic eval for other potential causes.He will need probable EGD as well for upper GI varices screening.

## 2023-08-21 LAB
ALBUMIN SERPL BCP-MCNC: 1.8 G/DL (ref 3.5–5)
ALBUMIN/GLOB SERPL: 0.4 {RATIO}
ALP SERPL-CCNC: 112 U/L (ref 45–115)
ALT SERPL W P-5'-P-CCNC: 37 U/L (ref 16–61)
ANION GAP SERPL CALCULATED.3IONS-SCNC: 11 MMOL/L (ref 7–16)
AST SERPL W P-5'-P-CCNC: 56 U/L (ref 15–37)
BASOPHILS # BLD AUTO: 0.04 K/UL (ref 0–0.2)
BASOPHILS NFR BLD AUTO: 0.5 % (ref 0–1)
BILIRUB SERPL-MCNC: 7.2 MG/DL (ref ?–1.2)
BUN SERPL-MCNC: 15 MG/DL (ref 7–18)
BUN/CREAT SERPL: 8 (ref 6–20)
CALCIUM SERPL-MCNC: 7.6 MG/DL (ref 8.5–10.1)
CHLORIDE SERPL-SCNC: 102 MMOL/L (ref 98–107)
CO2 SERPL-SCNC: 24 MMOL/L (ref 21–32)
CREAT SERPL-MCNC: 1.82 MG/DL (ref 0.7–1.3)
DIFFERENTIAL METHOD BLD: ABNORMAL
EGFR (NO RACE VARIABLE) (RUSH/TITUS): 45 ML/MIN/1.73M2
EOSINOPHIL # BLD AUTO: 0.37 K/UL (ref 0–0.5)
EOSINOPHIL NFR BLD AUTO: 4.4 % (ref 1–4)
ERYTHROCYTE [DISTWIDTH] IN BLOOD BY AUTOMATED COUNT: 21.4 % (ref 11.5–14.5)
GLOBULIN SER-MCNC: 5 G/DL (ref 2–4)
GLUCOSE SERPL-MCNC: 111 MG/DL (ref 74–106)
HCT VFR BLD AUTO: 21 % (ref 40–54)
HCT VFR BLD AUTO: 21.8 % (ref 40–54)
HGB BLD-MCNC: 6.6 G/DL (ref 13.5–18)
HGB BLD-MCNC: 7.1 G/DL (ref 13.5–18)
IMM GRANULOCYTES # BLD AUTO: 0.04 K/UL (ref 0–0.04)
IMM GRANULOCYTES NFR BLD: 0.5 % (ref 0–0.4)
INR BLD: 2.01
LYMPHOCYTES # BLD AUTO: 0.93 K/UL (ref 1–4.8)
LYMPHOCYTES NFR BLD AUTO: 11.1 % (ref 27–41)
MCH RBC QN AUTO: 30.3 PG (ref 27–31)
MCHC RBC AUTO-ENTMCNC: 32.6 G/DL (ref 32–36)
MCV RBC AUTO: 93.2 FL (ref 80–96)
MONOCYTES # BLD AUTO: 0.94 K/UL (ref 0–0.8)
MONOCYTES NFR BLD AUTO: 11.2 % (ref 2–6)
MPC BLD CALC-MCNC: 12.2 FL (ref 9.4–12.4)
NEUTROPHILS # BLD AUTO: 6.09 K/UL (ref 1.8–7.7)
NEUTROPHILS NFR BLD AUTO: 72.3 % (ref 53–65)
NRBC # BLD AUTO: 0 X10E3/UL
NRBC, AUTO (.00): 0 %
PLATELET # BLD AUTO: 67 K/UL (ref 150–400)
POTASSIUM SERPL-SCNC: 3.3 MMOL/L (ref 3.5–5.1)
PROT SERPL-MCNC: 6.8 G/DL (ref 6.4–8.2)
PROTHROMBIN TIME: 22.4 SECONDS (ref 11.7–14.7)
RBC # BLD AUTO: 2.34 M/UL (ref 4.6–6.2)
SODIUM SERPL-SCNC: 134 MMOL/L (ref 136–145)
WBC # BLD AUTO: 8.41 K/UL (ref 4.5–11)

## 2023-08-21 PROCEDURE — 25500020 PHARM REV CODE 255: Performed by: FAMILY MEDICINE

## 2023-08-21 PROCEDURE — 25000003 PHARM REV CODE 250: Performed by: GENERAL PRACTICE

## 2023-08-21 PROCEDURE — 11000001 HC ACUTE MED/SURG PRIVATE ROOM

## 2023-08-21 PROCEDURE — 63600175 PHARM REV CODE 636 W HCPCS: Performed by: ANESTHESIOLOGY

## 2023-08-21 PROCEDURE — 25000003 PHARM REV CODE 250: Performed by: INTERNAL MEDICINE

## 2023-08-21 PROCEDURE — 85025 COMPLETE CBC W/AUTO DIFF WBC: CPT | Performed by: INTERNAL MEDICINE

## 2023-08-21 PROCEDURE — 85014 HEMATOCRIT: CPT | Performed by: GENERAL PRACTICE

## 2023-08-21 PROCEDURE — 63600175 PHARM REV CODE 636 W HCPCS: Performed by: GENERAL PRACTICE

## 2023-08-21 PROCEDURE — C9113 INJ PANTOPRAZOLE SODIUM, VIA: HCPCS | Performed by: INTERNAL MEDICINE

## 2023-08-21 PROCEDURE — 80053 COMPREHEN METABOLIC PANEL: CPT | Performed by: INTERNAL MEDICINE

## 2023-08-21 PROCEDURE — 99232 SBSQ HOSP IP/OBS MODERATE 35: CPT | Mod: ,,, | Performed by: INTERNAL MEDICINE

## 2023-08-21 PROCEDURE — 63600175 PHARM REV CODE 636 W HCPCS: Performed by: INTERNAL MEDICINE

## 2023-08-21 PROCEDURE — 25000003 PHARM REV CODE 250: Performed by: STUDENT IN AN ORGANIZED HEALTH CARE EDUCATION/TRAINING PROGRAM

## 2023-08-21 PROCEDURE — 99232 PR SUBSEQUENT HOSPITAL CARE,LEVL II: ICD-10-PCS | Mod: ,,, | Performed by: INTERNAL MEDICINE

## 2023-08-21 PROCEDURE — 25000003 PHARM REV CODE 250

## 2023-08-21 PROCEDURE — P9016 RBC LEUKOCYTES REDUCED: HCPCS | Performed by: NURSE PRACTITIONER

## 2023-08-21 PROCEDURE — 85610 PROTHROMBIN TIME: CPT | Performed by: GENERAL PRACTICE

## 2023-08-21 RX ORDER — HYDROCODONE BITARTRATE AND ACETAMINOPHEN 500; 5 MG/1; MG/1
TABLET ORAL
Status: DISCONTINUED | OUTPATIENT
Start: 2023-08-21 | End: 2023-08-27

## 2023-08-21 RX ORDER — CHLORDIAZEPOXIDE HYDROCHLORIDE 5 MG/1
10 CAPSULE, GELATIN COATED ORAL EVERY 8 HOURS
Status: DISCONTINUED | OUTPATIENT
Start: 2023-08-22 | End: 2023-08-22

## 2023-08-21 RX ORDER — CHLORDIAZEPOXIDE HYDROCHLORIDE 5 MG/1
15 CAPSULE, GELATIN COATED ORAL EVERY 8 HOURS
Status: COMPLETED | OUTPATIENT
Start: 2023-08-21 | End: 2023-08-22

## 2023-08-21 RX ADMIN — SODIUM CHLORIDE: 9 INJECTION, SOLUTION INTRAVENOUS at 11:08

## 2023-08-21 RX ADMIN — Medication 6 MG: at 10:08

## 2023-08-21 RX ADMIN — THIAMINE HYDROCHLORIDE 500 MG: 100 INJECTION, SOLUTION INTRAMUSCULAR; INTRAVENOUS at 09:08

## 2023-08-21 RX ADMIN — OXYCODONE AND ACETAMINOPHEN 1 TABLET: 10; 325 TABLET ORAL at 10:08

## 2023-08-21 RX ADMIN — PANTOPRAZOLE SODIUM 40 MG: 40 INJECTION, POWDER, FOR SOLUTION INTRAVENOUS at 10:08

## 2023-08-21 RX ADMIN — PERFLUTREN 1.5 ML: 6.52 INJECTION, SUSPENSION INTRAVENOUS at 08:08

## 2023-08-21 RX ADMIN — THIAMINE HYDROCHLORIDE 500 MG: 100 INJECTION, SOLUTION INTRAMUSCULAR; INTRAVENOUS at 03:08

## 2023-08-21 RX ADMIN — CHLORDIAZEPOXIDE HYDROCHLORIDE 15 MG: 5 CAPSULE ORAL at 10:08

## 2023-08-21 RX ADMIN — PANTOPRAZOLE SODIUM 40 MG: 40 INJECTION, POWDER, FOR SOLUTION INTRAVENOUS at 09:08

## 2023-08-21 RX ADMIN — THIAMINE HYDROCHLORIDE 500 MG: 100 INJECTION, SOLUTION INTRAMUSCULAR; INTRAVENOUS at 10:08

## 2023-08-21 RX ADMIN — SODIUM CHLORIDE, POTASSIUM CHLORIDE, SODIUM LACTATE AND CALCIUM CHLORIDE 125 ML/HR: 600; 310; 30; 20 INJECTION, SOLUTION INTRAVENOUS at 01:08

## 2023-08-21 RX ADMIN — CHLORDIAZEPOXIDE HYDROCHLORIDE 15 MG: 5 CAPSULE ORAL at 02:08

## 2023-08-21 RX ADMIN — FOLIC ACID 1 MG: 1 TABLET ORAL at 09:08

## 2023-08-21 NOTE — SUBJECTIVE & OBJECTIVE
Interval History: 8/21: Pt examined at bedside. Rectal bleeding has stopped. Pt currently receiving 2nd U PRBC. Gastroenterology may consider a colonoscopy.     Review of Systems   Constitutional:  Negative for chills, fever and unexpected weight change.   HENT:  Negative for sore throat and trouble swallowing.    Respiratory:  Negative for cough, shortness of breath and wheezing.    Cardiovascular:  Negative for chest pain.   Gastrointestinal:  Positive for blood in stool. Negative for abdominal pain, nausea, rectal pain and vomiting.   Genitourinary:  Negative for dysuria, flank pain and hematuria.   Neurological:  Negative for seizures, syncope and speech difficulty.   Psychiatric/Behavioral:  Negative for behavioral problems and confusion.      Objective:     Vital Signs (Most Recent):  Temp: 98.4 °F (36.9 °C) (08/21/23 1554)  Pulse: 89 (08/21/23 1554)  Resp: 18 (08/21/23 1554)  BP: 134/67 (08/21/23 1554)  SpO2: 97 % (08/21/23 1554) Vital Signs (24h Range):  Temp:  [97.3 °F (36.3 °C)-98.4 °F (36.9 °C)] 98.4 °F (36.9 °C)  Pulse:  [76-92] 89  Resp:  [11-18] 18  SpO2:  [95 %-100 %] 97 %  BP: (109-154)/(50-82) 134/67     Weight: (!) 145.9 kg (321 lb 10.4 oz)  Body mass index is 53.53 kg/m².    Intake/Output Summary (Last 24 hours) at 8/21/2023 1637  Last data filed at 8/21/2023 0400  Gross per 24 hour   Intake --   Output 800 ml   Net -800 ml         Physical Exam  Vitals reviewed.   HENT:      Head: Normocephalic and atraumatic.   Cardiovascular:      Rate and Rhythm: Normal rate.   Pulmonary:      Effort: Pulmonary effort is normal. No respiratory distress.   Skin:     General: Skin is warm and dry.   Neurological:      Mental Status: He is alert. Mental status is at baseline.             Significant Labs: All pertinent labs within the past 24 hours have been reviewed.    Significant Imaging: I have reviewed all pertinent imaging results/findings within the past 24 hours.

## 2023-08-21 NOTE — PLAN OF CARE
Problem: Adult Inpatient Plan of Care  Goal: Plan of Care Review  8/20/2023 1944 by Rachel Irvin LPN  Outcome: Ongoing, Progressing  8/20/2023 1944 by Rachel Irvin LPN  Outcome: Ongoing, Progressing  Goal: Patient-Specific Goal (Individualized)  8/20/2023 1944 by Rachel Irvin LPN  Outcome: Ongoing, Progressing  8/20/2023 1944 by Rachel Irvin LPN  Outcome: Ongoing, Progressing  Goal: Absence of Hospital-Acquired Illness or Injury  8/20/2023 1944 by Rachel Irvin LPN  Outcome: Ongoing, Progressing  8/20/2023 1944 by Rachel Irvin LPN  Outcome: Ongoing, Progressing  Goal: Optimal Comfort and Wellbeing  8/20/2023 1944 by Rachel Irvin LPN  Outcome: Ongoing, Progressing  8/20/2023 1944 by Rachel Irvin LPN  Outcome: Ongoing, Progressing  Goal: Readiness for Transition of Care  8/20/2023 1944 by Rachel Irvin LPN  Outcome: Ongoing, Progressing  8/20/2023 1944 by Rachel Irvin LPN  Outcome: Ongoing, Progressing

## 2023-08-21 NOTE — ASSESSMENT & PLAN NOTE
Likely secondary to liver cirrhosis      8/21: General surgery--  To the OR for rectal exam under anesthesia with possible hemorrhoidectomy.       Risks and benefits explained with the patient including risks for infection, bleeding, injury to surrounding structures, hematoma/seroma formation with need for possible evacuation, possible open.  The patient verbalized understanding, agrees and wishes to proceed with surgery.     Hemoglobin 6.1; currently receiving 2nd unit of packed red blood cells

## 2023-08-21 NOTE — PLAN OF CARE
[facility] - [facility] Telemetry  Initial Discharge Assessment       Primary Care Provider: Romel Miranda    Admission Diagnosis: Edema [R60.9]  Cirrhosis [K74.60]  Hypoalbuminemia [E88.09]  Bleeding hemorrhoid [K64.9]  Chest pain [R07.9]  Anemia, unspecified type [D64.9]    Admission Date: 8/19/2023  Expected Discharge Date:     Transition of Care Barriers: None    Payor: /     Extended Emergency Contact Information  Primary Emergency Contact: Chanel Hill  Address: [facility] [place] [address] [facility]  Home Phone: [phone]  Mobile Phone: [phone]  Relation: Significant other   needed? No    Discharge Plan A: Home with family  Discharge Plan B: Home with family      [facility]  [place] - [address]  [address] [facility]  [place] [address]  Phone: [phone] Fax: [phone]    [facility] [place] [place] - [address]  [address] [place] [address]  Phone: [phone] Fax: [phone]      Initial Assessment (most recent)       Adult Discharge Assessment - 08/21/23 1255          Discharge Assessment    Assessment Type Discharge Planning Assessment     Source of Information family     People in Home significant other     Do you expect to return to your current living situation? Yes     Do you have help at home or someone to help you manage your care at home? Yes     Who are your caregiver(s) and their phone number(s)? Augie Hill (significant other) [phone]     Prior to hospitilization cognitive status: Alert/Oriented     Current cognitive status: Alert/Oriented     Home Accessibility wheelchair accessible     Home Layout Able to live on 1st floor     Equipment Currently Used at Home none     Readmission within 30 days? No     Patient currently being followed by outpatient case management? No     Do you currently have service(s) that help you manage your care at home? No     Do you take  prescription medications? Yes     Do you have prescription coverage? No     Do you have any problems affording any of your prescribed medications? No     Is the patient taking medications as prescribed? yes     How do you get to doctors appointments? car, drives self     Are you on dialysis? No     Do you take coumadin? No     DME Needed Upon Discharge  none     Discharge Plan discussed with: Spouse/sig other     Name(s) and Number(s) Augie Hill (significant other) [phone]     Transition of Care Barriers None     Discharge Plan A Home with family     Discharge Plan B Home with family        Physical Activity    On average, how many days per week do you engage in moderate to strenuous exercise (like a brisk walk)? 0 days     On average, how many minutes do you engage in exercise at this level? 0 min        Financial Resource Strain    How hard is it for you to pay for the very basics like food, housing, medical care, and heating? Not hard at all        Housing Stability    In the last 12 months, was there a time when you were not able to pay the mortgage or rent on time? No     In the last 12 months, how many places have you lived? 1     In the last 12 months, was there a time when you did not have a steady place to sleep or slept in a shelter (including now)? No        Transportation Needs    In the past 12 months, has lack of transportation kept you from medical appointments or from getting medications? No     In the past 12 months, has lack of transportation kept you from meetings, work, or from getting things needed for daily living? No        Food Insecurity    Within the past 12 months, you worried that your food would run out before you got the money to buy more. Never true     Within the past 12 months, the food you bought just didn't last and you didn't have money to get more. Never true        Stress    Do you feel stress - tense, restless, nervous, or anxious, or unable to sleep at night because  your mind is troubled all the time - these days? Rather much        Social Connections    In a typical week, how many times do you talk on the phone with family, friends, or neighbors? More than three times a week     How often do you get together with friends or relatives? More than three times a week     How often do you attend Jewish or Religion services? Never     Do you belong to any clubs or organizations such as Jewish groups, unions, fraternal or athletic groups, or school groups? No     How often do you attend meetings of the clubs or organizations you belong to? Never     Are you , , , , never , or living with a partner? Living with partner        Alcohol Use    Q1: How often do you have a drink containing alcohol? Never     Q2: How many drinks containing alcohol do you have on a typical day when you are drinking? Patient does not drink     Q3: How often do you have six or more drinks on one occasion? Never        OTHER    Name(s) of People in Home Augie Hill (significant other) [phone]                   Patient lives at home with his significant other. No DME or HH pta. Plans to return home at discharge. SDOH complete. Patient listed as self pay. Patient has [employer] insurance card, but is unsure if it is active. SS faxed copy of card to admission department at this time. SS provided patient with $4 medication list, application to the [facility], and application for [facility] financial assistance. SS following for discharge needs as arise.

## 2023-08-21 NOTE — SUBJECTIVE & OBJECTIVE
Interval History:   H&H 6.  6/21.  Patient reports fatigue and weakness.  Will transfuse 2 units PRBCs.  Patient has not noticed any more rectal bleeding.  Otherwise, patient is stable and without complaints.    Medications:  Continuous Infusions:   lactated ringers 125 mL/hr (08/21/23 0141)     Scheduled Meds:   [START ON 8/22/2023] chlordiazepoxide  10 mg Oral Q8H    chlordiazepoxide  15 mg Oral Q8H    folic acid  1 mg Oral Daily    pantoprazole  40 mg Intravenous BID    thiamine (B-1) 500 mg in dextrose 5 % (D5W) 100 mL IVPB  500 mg Intravenous TID     PRN Meds:0.9%  NaCl infusion (for blood administration), 0.9%  NaCl infusion (for blood administration), 0.9%  NaCl infusion (for blood administration), acetaminophen, HYDROmorphone, LIDOcaine HCl 2%, lorazepam, melatonin, naloxone, ondansetron, ondansetron, oxyCODONE-acetaminophen, polyethylene glycol, prochlorperazine, simethicone, sodium chloride 0.9%     Review of patient's allergies indicates:  No Known Allergies  Objective:     Vital Signs (Most Recent):  Temp: 98.3 °F (36.8 °C) (08/21/23 1105)  Pulse: 88 (08/21/23 1105)  Resp: 18 (08/21/23 1105)  BP: 122/60 (08/21/23 1105)  SpO2: 98 % (08/21/23 1105) Vital Signs (24h Range):  Temp:  [97.3 °F (36.3 °C)-98.4 °F (36.9 °C)] 98.3 °F (36.8 °C)  Pulse:  [76-92] 88  Resp:  [11-18] 18  SpO2:  [95 %-100 %] 98 %  BP: (109-154)/(50-82) 122/60     Weight: (!) 145.9 kg (321 lb 10.4 oz)  Body mass index is 53.53 kg/m².    Intake/Output - Last 3 Shifts         08/19 0700 08/20 0659 08/20 0700 08/21 0659 08/21 0700 08/22 0659    Blood 340      Total Intake(mL/kg) 340 (2.4)      Urine (mL/kg/hr)  800 (0.2)     Total Output  800     Net +340 -800                     Physical Exam  Vitals reviewed.   Cardiovascular:      Rate and Rhythm: Normal rate.   Pulmonary:      Effort: Pulmonary effort is normal. No respiratory distress.   Skin:     General: Skin is warm and dry.   Neurological:      Mental Status: He is alert. Mental  status is at baseline.          Significant Labs:  I have reviewed all pertinent lab results within the past 24 hours.  CBC:   Recent Labs   Lab 08/21/23  0931 08/21/23  1212   WBC 8.41  --    RBC 2.34*  --    HGB 7.1* 6.6*   HCT 21.8* 21.0*   PLT 67*  --    MCV 93.2  --    MCH 30.3  --    MCHC 32.6  --      CMP:   Recent Labs   Lab 08/21/23  0931   *   CALCIUM 7.6*   ALBUMIN 1.8*   PROT 6.8   *   K 3.3*   CO2 24      BUN 15   CREATININE 1.82*   ALKPHOS 112   ALT 37   AST 56*   BILITOT 7.2*       Significant Diagnostics:  I have reviewed all pertinent imaging results/findings within the past 24 hours.
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
## 2023-08-21 NOTE — PROGRESS NOTES
[facility] - [facility] Hospital Medicine  Progress Note    Patient Name: John Spears  MRN: [id number]  Patient Class: IP- Inpatient   Admission Date: 8/19/2023  Length of Stay: 1 days  Attending Physician: Adelia Beckford DO  Primary Care Provider: Ruben Provider        Subjective:     Principal Problem:Bleeding hemorrhoid        HPI:  Patient is a 47 y/o male with PMH of HTN, HLD, cirrhosis of the liver and hemorrhoids who presents to the ED with complaint of COB and swelling of the abdomen, legs and feet that started about 1 month ago. Patient states that the swelling and SOB has been worsening for the last 2 weeks. Patient reports passing dark blood per rectum with bowel movement for the last 2 weeks. Patient reports having intermittent RUQ when he eats fried foot. Patient reports chills but no fever. Patient denies nausea, vomiting, chest pain or urinary changes. Patient reports drinking about 10 beers of 16 ounces per day for the 3 years and for the last months he stopped drinking beer and started drinking  liquor. He states that 1 fifth of liquor lasts for 3 days. Patient reports withdrawal symptoms in the past. Patient reports that he quit smoking cigarettes 5 years ago.      In the ED showed /67, HR 90, RR 16, SpO2 98% and afebrile. Blood work showed H/H 6.1/19, WBC 5.6, PLT 78, PT 21.2, INR 1.87, PTT 48.2. Sodium 134, potassium 2.8, Alkaline phosphatase 166, Total bilirrubin 6.5, AST/ALST 61/41, p-, troponin 16.7. UA negative for infection or blood. FOBT negative. EKG nsr with HR 89. Patient is receiving PRBC in the ER at this moment and electrolytes are being replenished. Patient will be admitted to the hospital for further management.       Overview/Hospital Course:  No notes on file    Interval History: 8/21: Pt examined at bedside. Rectal bleeding has stopped. Pt currently receiving 2nd U PRBC. Gastroenterology may consider a colonoscopy.      Review of Systems   Constitutional:  Negative for chills, fever and unexpected weight change.   HENT:  Negative for sore throat and trouble swallowing.    Respiratory:  Negative for cough, shortness of breath and wheezing.    Cardiovascular:  Negative for chest pain.   Gastrointestinal:  Positive for blood in stool. Negative for abdominal pain, nausea, rectal pain and vomiting.   Genitourinary:  Negative for dysuria, flank pain and hematuria.   Neurological:  Negative for seizures, syncope and speech difficulty.   Psychiatric/Behavioral:  Negative for behavioral problems and confusion.      Objective:     Vital Signs (Most Recent):  Temp: 98.4 °F (36.9 °C) (08/21/23 1554)  Pulse: 89 (08/21/23 1554)  Resp: 18 (08/21/23 1554)  BP: 134/67 (08/21/23 1554)  SpO2: 97 % (08/21/23 1554) Vital Signs (24h Range):  Temp:  [97.3 °F (36.3 °C)-98.4 °F (36.9 °C)] 98.4 °F (36.9 °C)  Pulse:  [76-92] 89  Resp:  [11-18] 18  SpO2:  [95 %-100 %] 97 %  BP: (109-154)/(50-82) 134/67     Weight: (!) 145.9 kg (321 lb 10.4 oz)  Body mass index is 53.53 kg/m².    Intake/Output Summary (Last 24 hours) at 8/21/2023 1637  Last data filed at 8/21/2023 0400  Gross per 24 hour   Intake --   Output 800 ml   Net -800 ml         Physical Exam  Vitals reviewed.   HENT:      Head: Normocephalic and atraumatic.   Cardiovascular:      Rate and Rhythm: Normal rate.   Pulmonary:      Effort: Pulmonary effort is normal. No respiratory distress.   Skin:     General: Skin is warm and dry.   Neurological:      Mental Status: He is alert. Mental status is at baseline.             Significant Labs: All pertinent labs within the past 24 hours have been reviewed.    Significant Imaging: I have reviewed all pertinent imaging results/findings within the past 24 hours.      Assessment/Plan:      * Bleeding hemorrhoid  Likely secondary to liver cirrhosis      8/21: General surgery--  To the OR for rectal exam under anesthesia with possible hemorrhoidectomy.        Risks and benefits explained with the patient including risks for infection, bleeding, injury to surrounding structures, hematoma/seroma formation with need for possible evacuation, possible open.  The patient verbalized understanding, agrees and wishes to proceed with surgery.     Hemoglobin 6.1; currently receiving 2nd unit of packed red blood cells      Hypertension  Holding home BB and HCTZ  Monitor BP    Alcoholic cirrhosis of liver  MELD score 22 points. 19.6% with estimated 3 month mortality  CIWA score 0  Last drink was 5 days ago   IV Ativan 1 mg Q1H PRN  Monitor AM CMP  Monitor signs of withdrawal         Anemia  Acute blood loss   Hgb 6.6. Receiving 2nd U PRBC. Monitor CBC      Hypoalbuminemia  Likely from malturition + or - synthetic function of the liver          VTE Risk Mitigation (From admission, onward)         Ordered     Reason for No Pharmacological VTE Prophylaxis  Once        Question:  Reasons:  Answer:  Active Bleeding    08/20/23 0043     IP VTE HIGH RISK PATIENT  Once         08/20/23 0043     Place sequential compression device  Until discontinued         08/20/23 0043                Discharge Planning   LUCRECIA:      Code Status: Full Code   Is the patient medically ready for discharge?:     Reason for patient still in hospital (select all that apply): Treatment  Discharge Plan A: Home with family                  Valencia Butcher MD  Department of Hospital Medicine   [facility] - [facility]

## 2023-08-22 ENCOUNTER — ANESTHESIA EVENT (OUTPATIENT)
Dept: GASTROENTEROLOGY | Facility: HOSPITAL | Age: 48
DRG: 988 | End: 2023-08-22
Payer: COMMERCIAL

## 2023-08-22 ENCOUNTER — ANESTHESIA (OUTPATIENT)
Dept: GASTROENTEROLOGY | Facility: HOSPITAL | Age: 48
DRG: 988 | End: 2023-08-22
Payer: COMMERCIAL

## 2023-08-22 LAB
ABO + RH BLD: NORMAL
ABO + RH BLD: NORMAL
AFP-TM SERPL-MCNC: 9.2 NG/ML (ref 0–8)
ALBUMIN SERPL BCP-MCNC: 2.1 G/DL (ref 3.5–5)
ALBUMIN/GLOB SERPL: 0.4 {RATIO}
ALP SERPL-CCNC: 107 U/L (ref 45–115)
ALT SERPL W P-5'-P-CCNC: 34 U/L (ref 16–61)
ANION GAP SERPL CALCULATED.3IONS-SCNC: 9 MMOL/L (ref 7–16)
AST SERPL W P-5'-P-CCNC: 51 U/L (ref 15–37)
BASOPHILS # BLD AUTO: 0.07 K/UL (ref 0–0.2)
BASOPHILS NFR BLD AUTO: 0.7 % (ref 0–1)
BILIRUB DIRECT SERPL-MCNC: 4 MG/DL (ref 0–0.2)
BILIRUB SERPL-MCNC: 8.2 MG/DL (ref ?–1.2)
BLD PROD TYP BPU: NORMAL
BLD PROD TYP BPU: NORMAL
BLOOD UNIT EXPIRATION DATE: NORMAL
BLOOD UNIT EXPIRATION DATE: NORMAL
BLOOD UNIT TYPE CODE: 5100
BLOOD UNIT TYPE CODE: 5100
BUN SERPL-MCNC: 19 MG/DL (ref 7–18)
BUN/CREAT SERPL: 8 (ref 6–20)
CALCIUM SERPL-MCNC: 7.9 MG/DL (ref 8.5–10.1)
CERULOPLASMIN SERPL-MCNC: 16.1 MG/DL
CHLORIDE SERPL-SCNC: 102 MMOL/L (ref 98–107)
CO2 SERPL-SCNC: 26 MMOL/L (ref 21–32)
CREAT SERPL-MCNC: 2.36 MG/DL (ref 0.7–1.3)
CROSSMATCH INTERPRETATION: NORMAL
CROSSMATCH INTERPRETATION: NORMAL
DIFFERENTIAL METHOD BLD: ABNORMAL
DISPENSE STATUS: NORMAL
DISPENSE STATUS: NORMAL
EGFR (NO RACE VARIABLE) (RUSH/TITUS): 33 ML/MIN/1.73M2
EOSINOPHIL # BLD AUTO: 0.36 K/UL (ref 0–0.5)
EOSINOPHIL NFR BLD AUTO: 3.6 % (ref 1–4)
ERYTHROCYTE [DISTWIDTH] IN BLOOD BY AUTOMATED COUNT: 20.2 % (ref 11.5–14.5)
FERRITIN SERPL-MCNC: 51 NG/ML (ref 26–388)
GLOBULIN SER-MCNC: 5.2 G/DL (ref 2–4)
GLUCOSE SERPL-MCNC: 84 MG/DL (ref 74–106)
HAV AB SER QL IA: REACTIVE
HAV IGM SER QL: NORMAL
HBV CORE IGM SER QL: NORMAL
HBV SURFACE AB SER-ACNC: NORMAL M[IU]/ML
HBV SURFACE AG SERPL QL IA: NORMAL
HCT VFR BLD AUTO: 24.9 % (ref 40–54)
HCV AB SER QL: NORMAL
HGB BLD-MCNC: 8.3 G/DL (ref 13.5–18)
HIV 1+O+2 AB SERPL QL: NORMAL
IMM GRANULOCYTES # BLD AUTO: 0.08 K/UL (ref 0–0.04)
IMM GRANULOCYTES NFR BLD: 0.8 % (ref 0–0.4)
INR BLD: 1.87
IRON SATN MFR SERPL: 14 % (ref 14–50)
IRON SERPL-MCNC: 49 ΜG/DL (ref 65–175)
LYMPHOCYTES # BLD AUTO: 1.26 K/UL (ref 1–4.8)
LYMPHOCYTES NFR BLD AUTO: 12.4 % (ref 27–41)
MCH RBC QN AUTO: 29.9 PG (ref 27–31)
MCHC RBC AUTO-ENTMCNC: 33.3 G/DL (ref 32–36)
MCV RBC AUTO: 89.6 FL (ref 80–96)
MONOCYTES # BLD AUTO: 1.79 K/UL (ref 0–0.8)
MONOCYTES NFR BLD AUTO: 17.7 % (ref 2–6)
MPC BLD CALC-MCNC: 12.4 FL (ref 9.4–12.4)
NEUTROPHILS # BLD AUTO: 6.58 K/UL (ref 1.8–7.7)
NEUTROPHILS NFR BLD AUTO: 64.8 % (ref 53–65)
NRBC # BLD AUTO: 0 X10E3/UL
NRBC, AUTO (.00): 0 %
PLATELET # BLD AUTO: 73 K/UL (ref 150–400)
POTASSIUM SERPL-SCNC: 3.3 MMOL/L (ref 3.5–5.1)
PROT SERPL-MCNC: 7.3 G/DL (ref 6.4–8.2)
PROTHROMBIN TIME: 21.2 SECONDS (ref 11.7–14.7)
RBC # BLD AUTO: 2.78 M/UL (ref 4.6–6.2)
SODIUM SERPL-SCNC: 134 MMOL/L (ref 136–145)
TIBC SERPL-MCNC: 340 ΜG/DL (ref 250–450)
UNIT NUMBER: NORMAL
UNIT NUMBER: NORMAL
WBC # BLD AUTO: 10.14 K/UL (ref 4.5–11)

## 2023-08-22 PROCEDURE — D9220A PRA ANESTHESIA: ICD-10-PCS | Mod: ,,, | Performed by: NURSE ANESTHETIST, CERTIFIED REGISTERED

## 2023-08-22 PROCEDURE — 25000003 PHARM REV CODE 250: Performed by: STUDENT IN AN ORGANIZED HEALTH CARE EDUCATION/TRAINING PROGRAM

## 2023-08-22 PROCEDURE — 82248 BILIRUBIN DIRECT: CPT | Performed by: INTERNAL MEDICINE

## 2023-08-22 PROCEDURE — 83550 IRON BINDING TEST: CPT | Performed by: INTERNAL MEDICINE

## 2023-08-22 PROCEDURE — 99232 SBSQ HOSP IP/OBS MODERATE 35: CPT | Mod: GC,,, | Performed by: FAMILY MEDICINE

## 2023-08-22 PROCEDURE — 83540 ASSAY OF IRON: CPT | Performed by: INTERNAL MEDICINE

## 2023-08-22 PROCEDURE — 86038 ANTINUCLEAR ANTIBODIES: CPT | Performed by: INTERNAL MEDICINE

## 2023-08-22 PROCEDURE — 88342 IMHCHEM/IMCYTCHM 1ST ANTB: CPT | Mod: 26,,, | Performed by: PATHOLOGY

## 2023-08-22 PROCEDURE — 25000003 PHARM REV CODE 250

## 2023-08-22 PROCEDURE — P9016 RBC LEUKOCYTES REDUCED: HCPCS | Performed by: NURSE PRACTITIONER

## 2023-08-22 PROCEDURE — 86706 HEP B SURFACE ANTIBODY: CPT | Performed by: INTERNAL MEDICINE

## 2023-08-22 PROCEDURE — 82390 ASSAY OF CERULOPLASMIN: CPT | Performed by: INTERNAL MEDICINE

## 2023-08-22 PROCEDURE — 82105 ALPHA-FETOPROTEIN SERUM: CPT | Performed by: INTERNAL MEDICINE

## 2023-08-22 PROCEDURE — 88342 SURGICAL PATHOLOGY: ICD-10-PCS | Mod: 26,,, | Performed by: PATHOLOGY

## 2023-08-22 PROCEDURE — 25000003 PHARM REV CODE 250: Performed by: NURSE ANESTHETIST, CERTIFIED REGISTERED

## 2023-08-22 PROCEDURE — 25000003 PHARM REV CODE 250: Performed by: GENERAL PRACTICE

## 2023-08-22 PROCEDURE — 43239 PR EGD, FLEX, W/BIOPSY, SGL/MULTI: ICD-10-PCS | Mod: ,,, | Performed by: INTERNAL MEDICINE

## 2023-08-22 PROCEDURE — C9113 INJ PANTOPRAZOLE SODIUM, VIA: HCPCS | Performed by: INTERNAL MEDICINE

## 2023-08-22 PROCEDURE — 43239 EGD BIOPSY SINGLE/MULTIPLE: CPT | Mod: ,,, | Performed by: INTERNAL MEDICINE

## 2023-08-22 PROCEDURE — 63600175 PHARM REV CODE 636 W HCPCS: Performed by: NURSE ANESTHETIST, CERTIFIED REGISTERED

## 2023-08-22 PROCEDURE — 80053 COMPREHEN METABOLIC PANEL: CPT | Performed by: INTERNAL MEDICINE

## 2023-08-22 PROCEDURE — 43239 EGD BIOPSY SINGLE/MULTIPLE: CPT | Performed by: INTERNAL MEDICINE

## 2023-08-22 PROCEDURE — 88305 TISSUE EXAM BY PATHOLOGIST: CPT | Mod: TC,SUR | Performed by: INTERNAL MEDICINE

## 2023-08-22 PROCEDURE — 63600175 PHARM REV CODE 636 W HCPCS: Performed by: INTERNAL MEDICINE

## 2023-08-22 PROCEDURE — 82728 ASSAY OF FERRITIN: CPT | Performed by: INTERNAL MEDICINE

## 2023-08-22 PROCEDURE — 86015 ACTIN ANTIBODY EACH: CPT | Mod: 90 | Performed by: INTERNAL MEDICINE

## 2023-08-22 PROCEDURE — 83516 IMMUNOASSAY NONANTIBODY: CPT | Performed by: INTERNAL MEDICINE

## 2023-08-22 PROCEDURE — 86704 HEP B CORE ANTIBODY TOTAL: CPT | Mod: 90 | Performed by: INTERNAL MEDICINE

## 2023-08-22 PROCEDURE — 80074 ACUTE HEPATITIS PANEL: CPT | Performed by: INTERNAL MEDICINE

## 2023-08-22 PROCEDURE — 86708 HEPATITIS A ANTIBODY: CPT | Performed by: INTERNAL MEDICINE

## 2023-08-22 PROCEDURE — 87389 HIV-1 AG W/HIV-1&-2 AB AG IA: CPT | Performed by: INTERNAL MEDICINE

## 2023-08-22 PROCEDURE — D9220A PRA ANESTHESIA: Mod: ,,, | Performed by: NURSE ANESTHETIST, CERTIFIED REGISTERED

## 2023-08-22 PROCEDURE — 88305 SURGICAL PATHOLOGY: ICD-10-PCS | Mod: 26,,, | Performed by: PATHOLOGY

## 2023-08-22 PROCEDURE — 85025 COMPLETE CBC W/AUTO DIFF WBC: CPT | Performed by: INTERNAL MEDICINE

## 2023-08-22 PROCEDURE — 88305 TISSUE EXAM BY PATHOLOGIST: CPT | Mod: 26,,, | Performed by: PATHOLOGY

## 2023-08-22 PROCEDURE — 99232 PR SUBSEQUENT HOSPITAL CARE,LEVL II: ICD-10-PCS | Mod: GC,,, | Performed by: FAMILY MEDICINE

## 2023-08-22 PROCEDURE — 85610 PROTHROMBIN TIME: CPT | Performed by: GENERAL PRACTICE

## 2023-08-22 PROCEDURE — 11000001 HC ACUTE MED/SURG PRIVATE ROOM

## 2023-08-22 PROCEDURE — 63600175 PHARM REV CODE 636 W HCPCS: Performed by: GENERAL PRACTICE

## 2023-08-22 RX ORDER — LIDOCAINE HYDROCHLORIDE 20 MG/ML
INJECTION, SOLUTION EPIDURAL; INFILTRATION; INTRACAUDAL; PERINEURAL
Status: DISCONTINUED | OUTPATIENT
Start: 2023-08-22 | End: 2023-08-22

## 2023-08-22 RX ORDER — CHLORDIAZEPOXIDE HYDROCHLORIDE 5 MG/1
10 CAPSULE, GELATIN COATED ORAL EVERY 8 HOURS
Status: COMPLETED | OUTPATIENT
Start: 2023-08-22 | End: 2023-08-22

## 2023-08-22 RX ORDER — PROPOFOL 10 MG/ML
VIAL (ML) INTRAVENOUS
Status: DISCONTINUED | OUTPATIENT
Start: 2023-08-22 | End: 2023-08-22

## 2023-08-22 RX ORDER — CHLORDIAZEPOXIDE HYDROCHLORIDE 5 MG/1
5 CAPSULE, GELATIN COATED ORAL 3 TIMES DAILY
Status: COMPLETED | OUTPATIENT
Start: 2023-08-23 | End: 2023-08-23

## 2023-08-22 RX ADMIN — THIAMINE HYDROCHLORIDE 500 MG: 100 INJECTION, SOLUTION INTRAMUSCULAR; INTRAVENOUS at 04:08

## 2023-08-22 RX ADMIN — PANTOPRAZOLE SODIUM 40 MG: 40 INJECTION, POWDER, FOR SOLUTION INTRAVENOUS at 09:08

## 2023-08-22 RX ADMIN — PROPOFOL 80 MG: 10 INJECTION, EMULSION INTRAVENOUS at 12:08

## 2023-08-22 RX ADMIN — CHLORDIAZEPOXIDE HYDROCHLORIDE 10 MG: 5 CAPSULE ORAL at 10:08

## 2023-08-22 RX ADMIN — OXYCODONE AND ACETAMINOPHEN 1 TABLET: 10; 325 TABLET ORAL at 06:08

## 2023-08-22 RX ADMIN — CHLORDIAZEPOXIDE HYDROCHLORIDE 10 MG: 5 CAPSULE ORAL at 04:08

## 2023-08-22 RX ADMIN — CHLORDIAZEPOXIDE HYDROCHLORIDE 15 MG: 5 CAPSULE ORAL at 06:08

## 2023-08-22 RX ADMIN — THIAMINE HYDROCHLORIDE 500 MG: 100 INJECTION, SOLUTION INTRAMUSCULAR; INTRAVENOUS at 09:08

## 2023-08-22 RX ADMIN — FOLIC ACID 1 MG: 1 TABLET ORAL at 09:08

## 2023-08-22 RX ADMIN — LIDOCAINE HYDROCHLORIDE 80 MG: 20 INJECTION, SOLUTION INTRAVENOUS at 12:08

## 2023-08-22 RX ADMIN — THIAMINE HYDROCHLORIDE 500 MG: 100 INJECTION, SOLUTION INTRAMUSCULAR; INTRAVENOUS at 10:08

## 2023-08-22 RX ADMIN — SODIUM CHLORIDE: 9 INJECTION, SOLUTION INTRAVENOUS at 12:08

## 2023-08-22 NOTE — TRANSFER OF CARE
"Anesthesia Transfer of Care Note    Patient: John Spears    Procedure(s) Performed: * No procedures listed *    Patient location: GI    Anesthesia Type: general    Transport from OR: Transported from OR on room air with adequate spontaneous ventilation. Continuous ECG monitoring in transport. Continuous SpO2 monitoring in transport    Post pain: adequate analgesia    Post assessment: no apparent anesthetic complications    Post vital signs: stable    Level of consciousness: sedated and responds to stimulation    Nausea/Vomiting: no nausea/vomiting    Complications: none    Transfer of care protocol was followedComments: Good SV continue, NAD, VSS, RTRN      Last vitals:   Visit Vitals  BP (!) 107/57   Pulse 94   Temp 36.6 °C (97.9 °F)   Resp 16   Ht 5' 5" (1.651 m)   Wt (!) 145.6 kg (321 lb)   SpO2 99%   BMI 53.42 kg/m²     "
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
## 2023-08-22 NOTE — ASSESSMENT & PLAN NOTE
Likely secondary to liver cirrhosis      8/21: General surgery--  To the OR for rectal exam under anesthesia with possible hemorrhoidectomy.       Risks and benefits explained with the patient including risks for infection, bleeding, injury to surrounding structures, hematoma/seroma formation with need for possible evacuation, possible open.  The patient verbalized understanding, agrees and wishes to proceed with surgery.     Rectal bleeding has stopped

## 2023-08-22 NOTE — DISCHARGE INSTRUCTIONS
Procedure Date  8/22/23     Impression  Overall Impression:   Moderate abnormal mucosa in the body of the stomach and antrum, consistent with gastritis; performed cold forceps biopsies to rule out H. pylori  The upper third of the esophagus, middle third of the esophagus, lower third of the esophagus, Z-line, cardia, fundus of the stomach, body of the stomach, incisura, antrum, duodenal bulb, 1st part of the duodenum and 2nd part of the duodenum appeared normal.  No varices, blood/bleeding, or peptic ulcer      Recommendation    Await pathology results     Schedule repeat EGD for EV screening in 2 years unless he becomes decompensated in the interim   Liver enzymes worsening - recommend obtaining MRCP today      Outcome of procedure: successful EGD  Disposition: patient to recovery following procedure; return to [facility] when appropriate parameters met  Provisions for follow up: please call my office for any unexpected symptoms like chest or abdominal pain or bleeding following your procedure.  Final Diagnosis: compensated cirrhosis

## 2023-08-22 NOTE — ASSESSMENT & PLAN NOTE
Acute blood loss       Received 2U PRBC. Current Hgb/HCT: 8.3/24.9. Monitor CBC  No bleeding noted on EGD

## 2023-08-22 NOTE — SUBJECTIVE & OBJECTIVE
Interval History: 8/22: Pt examined at bedside. 2U PRBC received. Hgb/HCT  inc to 8.3/24.9. EGD revealed gastritis and was negative for varices, bleeding and peptic ulcer. GI recommends MRCP due to worsening liver enzymes and cirrhosis.     Review of Systems  Objective:     Vital Signs (Most Recent):  Temp: 97.7 °F (36.5 °C) (08/22/23 1643)  Pulse: 85 (08/22/23 1643)  Resp: 18 (08/22/23 1643)  BP: (!) 147/81 (08/22/23 1643)  SpO2: 95 % (08/22/23 1643) Vital Signs (24h Range):  Temp:  [97.7 °F (36.5 °C)-98.6 °F (37 °C)] 97.7 °F (36.5 °C)  Pulse:  [75-95] 85  Resp:  [9-20] 18  SpO2:  [93 %-100 %] 95 %  BP: (107-148)/(57-83) 147/81     Weight: (!) 145.6 kg (321 lb)  Body mass index is 53.42 kg/m².    Intake/Output Summary (Last 24 hours) at 8/22/2023 1733  Last data filed at 8/22/2023 1316  Gross per 24 hour   Intake 795 ml   Output --   Net 795 ml         Physical Exam  Vitals reviewed.   HENT:      Head: Normocephalic.   Cardiovascular:      Rate and Rhythm: Normal rate.   Pulmonary:      Effort: Pulmonary effort is normal. No respiratory distress.   Abdominal:      Palpations: Abdomen is soft.   Musculoskeletal:      Right lower leg: Edema present.      Left lower leg: Edema present.   Skin:     General: Skin is warm and dry.   Neurological:      Mental Status: He is alert. Mental status is at baseline.             Significant Labs: All pertinent labs within the past 24 hours have been reviewed.    Significant Imaging: I have reviewed all pertinent imaging results/findings within the past 24 hours.

## 2023-08-22 NOTE — ANESTHESIA PREPROCEDURE EVALUATION
08/22/2023  John Spears is a 48 y.o., male.      Pre-op Assessment    I have reviewed the Patient Summary Reports.     I have reviewed the Nursing Notes. I have reviewed the NPO Status.   I have reviewed the Medications.     Review of Systems  Anesthesia Hx:  No problems with previous Anesthesia    Social:  Non-Smoker History ETOH abuse   Cardiovascular:   Hypertension    Hepatic/GI:   Liver Disease,    Endocrine:  Morbid Obesity / BMI > 40         Anesthesia Plan  Type of Anesthesia, risks & benefits discussed:    Anesthesia Type: Gen Natural Airway  Intra-op Monitoring Plan: Standard ASA Monitors  Post Op Pain Control Plan: IV/PO Opioids PRN  Induction:  IV  Informed Consent: Informed consent signed with the Patient and all parties understand the risks and agree with anesthesia plan.  All questions answered. Patient consented to blood products? Yes  ASA Score: 3  Day of Surgery Review of History & Physical: H&P Update referred to the surgeon/provider.I have interviewed and examined the patient. I have reviewed the patient's H&P dated: There are no significant changes.     Ready For Surgery From Anesthesia Perspective.     .

## 2023-08-22 NOTE — PROGRESS NOTES
"Gastroenterology Consult Note    Chief Complaint: Bleeding hemorrhoid    Consulted by:   Dr. Beckford    HPI:  John Spears is a 49 yo male with hx of daily ETOH use for years (12 pack beer /day for over 5 years) is admitted with c/o daily bright red hematochezia for the past 2 weeks. He denies abdominal pain, N/V, hematemesis or melena. He had Hgb 6 on admission and has received 1 unit RBC's with repeat hgb 6 and just completed 2nd unit of blood. Hemodynamics have been stable. Liver tests noted to be abnormal with Tbili 5.5, albumin 1.8, AST 56, ALT 36. He has previously been told he had fatty liver but not aware of any other liver problems. He has never had endoscopy. He denies previous hx of blood transfusion or IVDA.    INTERVAL  - s/p banding and cauterization of hemorrhoids in OR  - doing better this morning, no further bleeding  - no known h/o cirrhosis per patient; does report active EtOH drinking up to a few days ago    Review of Systems   Constitutional:  Negative for weight loss.   Gastrointestinal:  Positive for blood in stool. Negative for abdominal pain, constipation, diarrhea, heartburn, melena, nausea and vomiting.   All other systems reviewed and are negative.      Past Medical History:   Diagnosis Date    Fatty liver     History of alcohol abuse     Hypertension     Mixed hyperlipidemia     Thyroid disease      Past Surgical History:   Procedure Laterality Date    ABDOMINAL SURGERY      DIGITAL RECTAL EXAMINATION UNDER ANESTHESIA N/A 8/20/2023    Procedure: EXAM UNDER ANESTHESIA, DIGITAL, RECTUM;  Surgeon: Michael Gusman DO;  Location: [facility] OR;  Service: General;  Laterality: N/A;    EXCISIONAL HEMORRHOIDECTOMY N/A 8/20/2023    Procedure: HEMORRHOIDECTOMY;  Surgeon: Michael Gusman DO;  Location: [facility] OR;  Service: General;  Laterality: N/A;     History reviewed. No pertinent family history.    OBJECTIVE:  /61   Pulse 87   Temp 98.1 °F (36.7 °C)   Resp 18   Ht 5' 5" " (1.651 m)   Wt (!) 145.9 kg (321 lb 10.4 oz)   SpO2 97%   BMI 53.53 kg/m²   GEN: chronically ill appearing, obese, cooperative, NAD  HEENT: NCAT, OP benign, MMM  NECK: Supple, no LAD  CV: normal rate, regular rhythm  RESP: CTA anteriorly, unlabored  ABD: NABS, ND, NT, soft, no guarding  EXT: No clubbing, cyanosis; +edema to the thighs   SKIN: Warm and dry  NEURO: AAO x4. Afocal.    LABS:  Recent Results (from the past 336 hour(s))   CBC with Differential    Collection Time: 08/21/23  9:31 AM   Result Value Ref Range    WBC 8.41 4.50 - 11.00 K/uL    Hemoglobin 7.1 (L) 13.5 - 18.0 g/dL    Hematocrit 21.8 (L) 40.0 - 54.0 %    Platelet Count 67 (L) 150 - 400 K/uL   CBC with Differential    Collection Time: 08/20/23  4:48 AM   Result Value Ref Range    WBC 5.90 4.50 - 11.00 K/uL    Hemoglobin 6.0 (L) 13.5 - 18.0 g/dL    Hematocrit 18.6 (L) 40.0 - 54.0 %    Platelet Count 71 (L) 150 - 400 K/uL   CBC with Differential    Collection Time: 08/19/23  7:16 PM   Result Value Ref Range    WBC 5.66 4.50 - 11.00 K/uL    Hemoglobin 6.1 (L) 13.5 - 18.0 g/dL    Hematocrit 19.0 (L) 40.0 - 54.0 %    Platelet Count 78 (L) 150 - 400 K/uL     CMP  Sodium   Date Value Ref Range Status   08/21/2023 134 (L) 136 - 145 mmol/L Final     Potassium   Date Value Ref Range Status   08/21/2023 3.3 (L) 3.5 - 5.1 mmol/L Final     Chloride   Date Value Ref Range Status   08/21/2023 102 98 - 107 mmol/L Final     CO2   Date Value Ref Range Status   08/21/2023 24 21 - 32 mmol/L Final     Glucose   Date Value Ref Range Status   08/21/2023 111 (H) 74 - 106 mg/dL Final     BUN   Date Value Ref Range Status   08/21/2023 15 7 - 18 mg/dL Final     Creatinine   Date Value Ref Range Status   08/21/2023 1.82 (H) 0.70 - 1.30 mg/dL Final     Calcium   Date Value Ref Range Status   08/21/2023 7.6 (L) 8.5 - 10.1 mg/dL Final     Total Protein   Date Value Ref Range Status   08/21/2023 6.8 6.4 - 8.2 g/dL Final     Albumin   Date Value Ref Range Status   08/21/2023  1.8 (L) 3.5 - 5.0 g/dL Final     Bilirubin, Total   Date Value Ref Range Status   08/21/2023 7.2 (H) >0.0 - 1.2 mg/dL Final     Alk Phos   Date Value Ref Range Status   08/21/2023 112 45 - 115 U/L Final     AST   Date Value Ref Range Status   08/21/2023 56 (H) 15 - 37 U/L Final     ALT   Date Value Ref Range Status   08/21/2023 37 16 - 61 U/L Final     Anion Gap   Date Value Ref Range Status   08/21/2023 11 7 - 16 mmol/L Final     eGFR   Date Value Ref Range Status   08/21/2023 45 (L) >=60 mL/min/1.73m2 Final     Lab Results   Component Value Date    INR 2.01 08/21/2023    INR 1.87 08/19/2023       IMAGING:    Abd US  Nodular contour of the liver suspicious for cirrhosis. Small volume biliary sludge/stones.  There is nonspecific gallbladder wall thickening which can be seen in the setting of liver disease. Consider nuclear medicine hepatobiliary imaging study for further evaluation.  Borderline prominence of the common duct.  Correlate with lab values including bilirubin.  If clinically indicated, MRCP or ERCP may be of benefit for further evaluation.    ASSESSMENT:    49 yo obese AAM with longstanding EtOH dependence admitted with rectal bleeding.     PLAN:    Hematochezia  - Hgb 6 (12 in 2020); rectal bleeding  - s/p hemorrhoid banding and coag in OR  - will need outpatient colonoscopy in a couple of months after healing       Compensated Cirrhosis   - Etiology: likely alcohol, NAFLD  - suggestive by radiographic appearance, thrombocytopenia  - Proceed with chronic liver workup: ordered; Echo pending   - Vaccinations: check HAV/HBV status; recommend vaccination if not immune    - MELD: 30  - Ascites: none  - Varices: Npo for EGD tomorrow for EV screening   - Encephalopathy: none  - HCC Surveilance: no mass on US 8/2023  - Transplant candidate: encouraged alcohol abstinence; I did discuss with patient and wife that EtOH was related ot liver disease and continued alcohol intake would lead to worsening of liver  disease and that continued alcohol use may prevent him from getting a transplant in the future.        Hyperbilirubinemia  - with elevated ALP in prior days and borderline CBD  - denies abdominal pain; AST slightly higher than ALT on admission  - trend labs again tomorrow, if no improvement, may need MRCP  - MDFS is elevated; if MRCP were normal, might consider steroids for alcoholic hepatitis       Thank you for the consult and including me in the care of this patient. Please call me with questions.     Real Ng MD  Gastroenterology

## 2023-08-22 NOTE — ASSESSMENT & PLAN NOTE
MELD score 22 points. 19.6% with estimated 3 month mortality  CIWA score 0  Last drink was 5 days ago   IV Ativan 1 mg Q1H PRN  Monitor AM CMP  Monitor signs of withdrawal     MRCP due to worsening liver enzymes and cirrhosis

## 2023-08-22 NOTE — ANESTHESIA POSTPROCEDURE EVALUATION
Anesthesia Post Evaluation    Patient: John Spears    Procedure(s) Performed: * No procedures listed *    Final Anesthesia Type: general      Patient location during evaluation: GI PACU  Patient participation: Yes- Able to Participate  Level of consciousness: awake and alert  Post-procedure vital signs: reviewed and stable  Pain management: adequate  Airway patency: patent    PONV status at discharge: No PONV  Anesthetic complications: no      Cardiovascular status: blood pressure returned to baseline and hemodynamically stable  Respiratory status: spontaneous ventilation  Hydration status: euvolemic  Follow-up not needed.  Comments: Refer to nursing notes for pain/nicolás score upon discharge from recovery.          Vitals Value Taken Time   /73 08/22/23 1302   Temp 36.6 °C (97.9 °F) 08/22/23 1255   Pulse 90 08/22/23 1303   Resp 15 08/22/23 1303   SpO2 100 % 08/22/23 1303   Vitals shown include unvalidated device data.      No case tracking events are documented in the log.      Pain/Nicolás Score: Pain Rating Prior to Med Admin: 6 (8/22/2023  6:13 AM)  Pain Rating Post Med Admin: 3 (8/21/2023 11:07 PM)

## 2023-08-22 NOTE — PLAN OF CARE
Problem: Adult Inpatient Plan of Care  Goal: Plan of Care Review  8/21/2023 2257 by Rachel Irvin LPN  Outcome: Ongoing, Progressing  8/21/2023 2044 by Rachel Irvin LPN  Outcome: Ongoing, Progressing  Goal: Patient-Specific Goal (Individualized)  8/21/2023 2257 by Rachel Irvin LPN  Outcome: Ongoing, Progressing  8/21/2023 2044 by Rachel Irvin LPN  Outcome: Ongoing, Progressing  Goal: Absence of Hospital-Acquired Illness or Injury  8/21/2023 2257 by Rachel Irvin LPN  Outcome: Ongoing, Progressing  8/21/2023 2044 by Rachel Irvin LPN  Outcome: Ongoing, Progressing  Goal: Optimal Comfort and Wellbeing  8/21/2023 2257 by Rachel Irvin LPN  Outcome: Ongoing, Progressing  8/21/2023 2044 by Rachel Irvin LPN  Outcome: Ongoing, Progressing  Goal: Readiness for Transition of Care  8/21/2023 2257 by Rachel Irvin LPN  Outcome: Ongoing, Progressing  8/21/2023 2044 by Rachel Irvin LPN  Outcome: Ongoing, Progressing     Problem: Bariatric Environmental Safety  Goal: Safety Maintained with Care  8/21/2023 2257 by Rachel Irvin LPN  Outcome: Ongoing, Progressing  8/21/2023 2044 by Rachel Irvin LPN  Outcome: Ongoing, Progressing     Problem: Fall Injury Risk  Goal: Absence of Fall and Fall-Related Injury  8/21/2023 2257 by Rachel Irvin LPN  Outcome: Ongoing, Progressing  8/21/2023 2044 by Rachel Irvin LPN  Outcome: Ongoing, Progressing

## 2023-08-22 NOTE — SUBJECTIVE & OBJECTIVE
Interval History:   Stable, no acute changes overnight.  H&H 8.3/24.9 following PRBC transfusion yesterday.  Had a bowel movement this morning with minimal bleeding. Pending consult for Cardiology and Gastroenterology.    Medications:  Continuous Infusions:   lactated ringers 125 mL/hr (08/21/23 0141)     Scheduled Meds:   chlordiazepoxide  10 mg Oral Q8H    [START ON 8/23/2023] chlordiazepoxide  5 mg Oral TID    folic acid  1 mg Oral Daily    pantoprazole  40 mg Intravenous BID    potassium bicarbonate  40 mEq Oral Once    thiamine (B-1) 500 mg in dextrose 5 % (D5W) 100 mL IVPB  500 mg Intravenous TID     PRN Meds:0.9%  NaCl infusion (for blood administration), 0.9%  NaCl infusion (for blood administration), 0.9%  NaCl infusion (for blood administration), acetaminophen, HYDROmorphone, LIDOcaine HCl 2%, lorazepam, melatonin, naloxone, ondansetron, ondansetron, oxyCODONE-acetaminophen, polyethylene glycol, prochlorperazine, simethicone, sodium chloride 0.9%     Review of patient's allergies indicates:  No Known Allergies  Objective:     Vital Signs (Most Recent):  Temp: 98 °F (36.7 °C) (08/22/23 1100)  Pulse: 90 (08/22/23 1131)  Resp: 13 (08/22/23 1131)  BP: 131/60 (08/22/23 1131)  SpO2: 96 % (08/22/23 1131) Vital Signs (24h Range):  Temp:  [98 °F (36.7 °C)-98.6 °F (37 °C)] 98 °F (36.7 °C)  Pulse:  [75-90] 90  Resp:  [13-20] 13  SpO2:  [93 %-99 %] 96 %  BP: (122-148)/(60-83) 131/60     Weight: (!) 145.6 kg (321 lb)  Body mass index is 53.42 kg/m².    Intake/Output - Last 3 Shifts         08/20 0700  08/21 0659 08/21 0700  08/22 0659 08/22 0700  08/23 0659    Blood  595     Total Intake(mL/kg)  595 (4.1)     Urine (mL/kg/hr) 800 (0.2)      Total Output 800      Net -800 +595                     Physical Exam  Vitals reviewed.   Cardiovascular:      Rate and Rhythm: Normal rate.   Pulmonary:      Effort: Pulmonary effort is normal. No respiratory distress.   Skin:     General: Skin is warm and dry.   Neurological:       Mental Status: He is alert. Mental status is at baseline.          Significant Labs:  I have reviewed all pertinent lab results within the past 24 hours.  CBC:   Recent Labs   Lab 08/22/23  0705   WBC 10.14   RBC 2.78*   HGB 8.3*   HCT 24.9*   PLT 73*   MCV 89.6   MCH 29.9   MCHC 33.3       CMP:   Recent Labs   Lab 08/22/23  0705   GLU 84   CALCIUM 7.9*   ALBUMIN 2.1*   PROT 7.3   *   K 3.3*   CO2 26      BUN 19*   CREATININE 2.36*   ALKPHOS 107   ALT 34   AST 51*   BILITOT 8.2*         Significant Diagnostics:  I have reviewed all pertinent imaging results/findings within the past 24 hours.

## 2023-08-23 LAB
ALBUMIN SERPL BCP-MCNC: 1.9 G/DL (ref 3.5–5)
ALBUMIN/GLOB SERPL: 0.4 {RATIO}
ALP SERPL-CCNC: 114 U/L (ref 45–115)
ALT SERPL W P-5'-P-CCNC: 28 U/L (ref 16–61)
ANION GAP SERPL CALCULATED.3IONS-SCNC: 11 MMOL/L (ref 7–16)
AST SERPL W P-5'-P-CCNC: 53 U/L (ref 15–37)
BASOPHILS # BLD AUTO: 0.07 K/UL (ref 0–0.2)
BASOPHILS NFR BLD AUTO: 0.9 % (ref 0–1)
BILIRUB SERPL-MCNC: 7.6 MG/DL (ref ?–1.2)
BUN SERPL-MCNC: 21 MG/DL (ref 7–18)
BUN/CREAT SERPL: 10 (ref 6–20)
CALCIUM SERPL-MCNC: 8 MG/DL (ref 8.5–10.1)
CHLORIDE SERPL-SCNC: 103 MMOL/L (ref 98–107)
CO2 SERPL-SCNC: 25 MMOL/L (ref 21–32)
CREAT SERPL-MCNC: 2.14 MG/DL (ref 0.7–1.3)
DHEA SERPL-MCNC: NORMAL
DIFFERENTIAL METHOD BLD: ABNORMAL
EGFR (NO RACE VARIABLE) (RUSH/TITUS): 37 ML/MIN/1.73M2
EOSINOPHIL # BLD AUTO: 0.4 K/UL (ref 0–0.5)
EOSINOPHIL NFR BLD AUTO: 4.9 % (ref 1–4)
ERYTHROCYTE [DISTWIDTH] IN BLOOD BY AUTOMATED COUNT: 20.6 % (ref 11.5–14.5)
ESTROGEN SERPL-MCNC: NORMAL PG/ML
GLOBULIN SER-MCNC: 4.7 G/DL (ref 2–4)
GLUCOSE SERPL-MCNC: 86 MG/DL (ref 74–106)
HBV CORE AB SERPL QL IA: NEGATIVE
HCT VFR BLD AUTO: 22.6 % (ref 40–54)
HCT VFR BLD AUTO: 26.1 % (ref 40–54)
HGB BLD-MCNC: 7.6 G/DL (ref 13.5–18)
HGB BLD-MCNC: 8.4 G/DL (ref 13.5–18)
IMM GRANULOCYTES # BLD AUTO: 0.05 K/UL (ref 0–0.04)
IMM GRANULOCYTES NFR BLD: 0.6 % (ref 0–0.4)
INR BLD: 1.95
INSULIN SERPL-ACNC: NORMAL U[IU]/ML
LAB AP GROSS DESCRIPTION: NORMAL
LAB AP LABORATORY NOTES: NORMAL
LYMPHOCYTES # BLD AUTO: 1.14 K/UL (ref 1–4.8)
LYMPHOCYTES NFR BLD AUTO: 13.9 % (ref 27–41)
MAGNESIUM SERPL-MCNC: 2.1 MG/DL (ref 1.7–2.3)
MCH RBC QN AUTO: 30.3 PG (ref 27–31)
MCHC RBC AUTO-ENTMCNC: 33.6 G/DL (ref 32–36)
MCV RBC AUTO: 90 FL (ref 80–96)
MONOCYTES # BLD AUTO: 1.71 K/UL (ref 0–0.8)
MONOCYTES NFR BLD AUTO: 20.9 % (ref 2–6)
MPC BLD CALC-MCNC: 12.6 FL (ref 9.4–12.4)
NEUTROPHILS # BLD AUTO: 4.83 K/UL (ref 1.8–7.7)
NEUTROPHILS NFR BLD AUTO: 58.8 % (ref 53–65)
NRBC # BLD AUTO: 0 X10E3/UL
NRBC, AUTO (.00): 0 %
PLATELET # BLD AUTO: 64 K/UL (ref 150–400)
POTASSIUM SERPL-SCNC: 3.5 MMOL/L (ref 3.5–5.1)
PROT SERPL-MCNC: 6.6 G/DL (ref 6.4–8.2)
PROTHROMBIN TIME: 21.9 SECONDS (ref 11.7–14.7)
RBC # BLD AUTO: 2.51 M/UL (ref 4.6–6.2)
RBCS: NORMAL
SODIUM SERPL-SCNC: 135 MMOL/L (ref 136–145)
T3RU NFR SERPL: NORMAL %
WBC # BLD AUTO: 8.2 K/UL (ref 4.5–11)

## 2023-08-23 PROCEDURE — 99232 PR SUBSEQUENT HOSPITAL CARE,LEVL II: ICD-10-PCS | Mod: GC,,, | Performed by: FAMILY MEDICINE

## 2023-08-23 PROCEDURE — 83735 ASSAY OF MAGNESIUM: CPT

## 2023-08-23 PROCEDURE — 85025 COMPLETE CBC W/AUTO DIFF WBC: CPT | Performed by: INTERNAL MEDICINE

## 2023-08-23 PROCEDURE — 80053 COMPREHEN METABOLIC PANEL: CPT | Performed by: INTERNAL MEDICINE

## 2023-08-23 PROCEDURE — 25000003 PHARM REV CODE 250: Performed by: STUDENT IN AN ORGANIZED HEALTH CARE EDUCATION/TRAINING PROGRAM

## 2023-08-23 PROCEDURE — 25000003 PHARM REV CODE 250: Performed by: INTERNAL MEDICINE

## 2023-08-23 PROCEDURE — 85610 PROTHROMBIN TIME: CPT | Performed by: GENERAL PRACTICE

## 2023-08-23 PROCEDURE — 85014 HEMATOCRIT: CPT | Performed by: STUDENT IN AN ORGANIZED HEALTH CARE EDUCATION/TRAINING PROGRAM

## 2023-08-23 PROCEDURE — 63600175 PHARM REV CODE 636 W HCPCS: Performed by: GENERAL PRACTICE

## 2023-08-23 PROCEDURE — 25000003 PHARM REV CODE 250

## 2023-08-23 PROCEDURE — C9113 INJ PANTOPRAZOLE SODIUM, VIA: HCPCS | Performed by: INTERNAL MEDICINE

## 2023-08-23 PROCEDURE — 99232 SBSQ HOSP IP/OBS MODERATE 35: CPT | Mod: GC,,, | Performed by: FAMILY MEDICINE

## 2023-08-23 PROCEDURE — 11000001 HC ACUTE MED/SURG PRIVATE ROOM

## 2023-08-23 PROCEDURE — 63600175 PHARM REV CODE 636 W HCPCS: Performed by: INTERNAL MEDICINE

## 2023-08-23 PROCEDURE — 25000003 PHARM REV CODE 250: Performed by: GENERAL PRACTICE

## 2023-08-23 RX ADMIN — PANTOPRAZOLE SODIUM 40 MG: 40 INJECTION, POWDER, FOR SOLUTION INTRAVENOUS at 09:08

## 2023-08-23 RX ADMIN — OXYCODONE AND ACETAMINOPHEN 1 TABLET: 10; 325 TABLET ORAL at 09:08

## 2023-08-23 RX ADMIN — PHYTONADIONE 10 MG: 10 INJECTION, EMULSION INTRAMUSCULAR; INTRAVENOUS; SUBCUTANEOUS at 08:08

## 2023-08-23 RX ADMIN — OXYCODONE AND ACETAMINOPHEN 1 TABLET: 10; 325 TABLET ORAL at 05:08

## 2023-08-23 RX ADMIN — CHLORDIAZEPOXIDE HYDROCHLORIDE 5 MG: 5 CAPSULE ORAL at 09:08

## 2023-08-23 RX ADMIN — THIAMINE HYDROCHLORIDE 500 MG: 100 INJECTION, SOLUTION INTRAMUSCULAR; INTRAVENOUS at 10:08

## 2023-08-23 RX ADMIN — THIAMINE HYDROCHLORIDE 500 MG: 100 INJECTION, SOLUTION INTRAMUSCULAR; INTRAVENOUS at 09:08

## 2023-08-23 RX ADMIN — CHLORDIAZEPOXIDE HYDROCHLORIDE 5 MG: 5 CAPSULE ORAL at 03:08

## 2023-08-23 RX ADMIN — FOLIC ACID 1 MG: 1 TABLET ORAL at 09:08

## 2023-08-23 RX ADMIN — OXYCODONE AND ACETAMINOPHEN 1 TABLET: 10; 325 TABLET ORAL at 04:08

## 2023-08-23 RX ADMIN — THIAMINE HYDROCHLORIDE 500 MG: 100 INJECTION, SOLUTION INTRAMUSCULAR; INTRAVENOUS at 03:08

## 2023-08-23 NOTE — SUBJECTIVE & OBJECTIVE
Interval History:   Stable, no acute changes overnight.  H&H 7.6/22.6.  Bilirubin 7.6, history of cirrhosis.  Multiple gallstones without ductal dilatation.  If rectal bleeding persists, plan for flex sig with banding by Dr. Ng tomorrow.  If bleeding persists following procedure, would need transfer to hepatobiliary specialist for higher level of care.    Medications:  Continuous Infusions:   lactated ringers 125 mL/hr (08/21/23 0141)     Scheduled Meds:   chlordiazepoxide  5 mg Oral TID    folic acid  1 mg Oral Daily    pantoprazole  40 mg Intravenous BID    potassium bicarbonate  40 mEq Oral Once    thiamine (B-1) 500 mg in dextrose 5 % (D5W) 100 mL IVPB  500 mg Intravenous TID     PRN Meds:0.9%  NaCl infusion (for blood administration), 0.9%  NaCl infusion (for blood administration), 0.9%  NaCl infusion (for blood administration), acetaminophen, HYDROmorphone, LIDOcaine HCl 2%, lorazepam, melatonin, naloxone, ondansetron, ondansetron, oxyCODONE-acetaminophen, polyethylene glycol, prochlorperazine, simethicone, sodium chloride 0.9%     Review of patient's allergies indicates:  No Known Allergies  Objective:     Vital Signs (Most Recent):  Temp: 98.1 °F (36.7 °C) (08/23/23 1100)  Pulse: 87 (08/23/23 1100)  Resp: 18 (08/23/23 1100)  BP: (!) 156/86 (08/23/23 1100)  SpO2: 97 % (08/23/23 1100) Vital Signs (24h Range):  Temp:  [97.7 °F (36.5 °C)-98.8 °F (37.1 °C)] 98.1 °F (36.7 °C)  Pulse:  [85-96] 87  Resp:  [18] 18  SpO2:  [95 %-98 %] 97 %  BP: (121-156)/(57-86) 156/86     Weight: (!) 145.6 kg (321 lb)  Body mass index is 53.42 kg/m².    Intake/Output - Last 3 Shifts         08/21 0700 08/22 0659 08/22 0700 08/23 0659 08/23 0700 08/24 0659    I.V. (mL/kg)  200 (1.4)     Blood 595      Total Intake(mL/kg) 595 (4.1) 200 (1.4)     Urine (mL/kg/hr)       Total Output       Net +595 +200                     Physical Exam  Vitals reviewed.   Cardiovascular:      Rate and Rhythm: Normal rate.   Pulmonary:      Effort:  Pulmonary effort is normal. No respiratory distress.   Skin:     General: Skin is warm and dry.   Neurological:      Mental Status: He is alert. Mental status is at baseline.          Significant Labs:  I have reviewed all pertinent lab results within the past 24 hours.  CBC:   Recent Labs   Lab 08/23/23 0423   WBC 8.20   RBC 2.51*   HGB 7.6*   HCT 22.6*   PLT 64*   MCV 90.0   MCH 30.3   MCHC 33.6       CMP:   Recent Labs   Lab 08/23/23 0423   GLU 86   CALCIUM 8.0*   ALBUMIN 1.9*   PROT 6.6   *   K 3.5   CO2 25      BUN 21*   CREATININE 2.14*   ALKPHOS 114   ALT 28   AST 53*   BILITOT 7.6*         Significant Diagnostics:  I have reviewed all pertinent imaging results/findings within the past 24 hours.

## 2023-08-23 NOTE — SUBJECTIVE & OBJECTIVE
Interval History: 8/23: MRCP revealed gallstones. Plan for flex sign with banding if rectal bleeding persists with Dr. Ng. Pt noted to have significant B/L LE edema with hypoalbuminemia. Pt started on lasix and aldactone. Will monitor for improvement.    Review of Systems   Constitutional:  Positive for fatigue.   Respiratory:  Negative for chest tightness and shortness of breath.    Cardiovascular:  Negative for chest pain.   Gastrointestinal:  Negative for abdominal pain, diarrhea, nausea and vomiting.   Genitourinary:  Negative for dysuria.     Objective:     Vital Signs (Most Recent):  Temp: 98.1 °F (36.7 °C) (08/23/23 1100)  Pulse: 87 (08/23/23 1100)  Resp: 18 (08/23/23 1100)  BP: (!) 156/86 (08/23/23 1100)  SpO2: 97 % (08/23/23 1100) Vital Signs (24h Range):  Temp:  [97.7 °F (36.5 °C)-98.8 °F (37.1 °C)] 98.1 °F (36.7 °C)  Pulse:  [85-96] 87  Resp:  [9-18] 18  SpO2:  [95 %-100 %] 97 %  BP: (107-156)/(57-86) 156/86     Weight: (!) 145.6 kg (321 lb)  Body mass index is 53.42 kg/m².    Intake/Output Summary (Last 24 hours) at 8/23/2023 1221  Last data filed at 8/22/2023 1316  Gross per 24 hour   Intake 200 ml   Output --   Net 200 ml         Physical Exam  Vitals reviewed.   HENT:      Head: Normocephalic.   Cardiovascular:      Rate and Rhythm: Normal rate.   Pulmonary:      Effort: Pulmonary effort is normal. No respiratory distress.   Abdominal:      Palpations: Abdomen is soft.   Musculoskeletal:      Right lower leg: Edema present.      Left lower leg: Edema present.   Skin:     General: Skin is warm and dry.   Neurological:      Mental Status: He is alert. Mental status is at baseline.           Significant Labs: All pertinent labs within the past 24 hours have been reviewed.    Significant Imaging: I have reviewed all pertinent imaging results/findings within the past 24 hours.

## 2023-08-23 NOTE — ASSESSMENT & PLAN NOTE
MELD score 22 points. 19.6% with estimated 3 month mortality  CIWA score 0  Last drink was 5 days ago   IV Ativan 1 mg Q1H PRN  Monitor AM CMP  Monitor signs of withdrawal     MRCP reveals cirrhosis and multiple gallstones
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
## 2023-08-23 NOTE — PROGRESS NOTES
Gastroenterology Consult Note    Chief Complaint: Bleeding hemorrhoid    Consulted by:   Dr. Beckford    HPI:  John Spears is a 49 yo male with hx of daily ETOH use for years (12 pack beer /day for over 5 years) is admitted with c/o daily bright red hematochezia for the past 2 weeks. He denies abdominal pain, N/V, hematemesis or melena. He had Hgb 6 on admission and has received 1 unit RBC's with repeat hgb 6 and just completed 2nd unit of blood. Hemodynamics have been stable. Liver tests noted to be abnormal with Tbili 5.5, albumin 1.8, AST 56, ALT 36. He has previously been told he had fatty liver but not aware of any other liver problems. He has never had endoscopy. He denies previous hx of blood transfusion or IVDA. Patient went to OR with Dr. Gusman for control of bleeding - suspected rectal varices vs IH and proceeded with banding and fulguration, but reports bands came off.     INTERVAL  - doing ok today  - H&H did drop some and received 1u pRBC last night  - had BRB and clot per rectum this morning after packing removed     Review of Systems   Constitutional:  Negative for weight loss.   Gastrointestinal:  Positive for blood in stool. Negative for abdominal pain, constipation, diarrhea, heartburn, melena, nausea and vomiting.   All other systems reviewed and are negative.      Past Medical History:   Diagnosis Date    Fatty liver     History of alcohol abuse     Hypertension     Mixed hyperlipidemia     Thyroid disease      Past Surgical History:   Procedure Laterality Date    ABDOMINAL SURGERY      DIGITAL RECTAL EXAMINATION UNDER ANESTHESIA N/A 8/20/2023    Procedure: EXAM UNDER ANESTHESIA, DIGITAL, RECTUM;  Surgeon: Michael Gusman DO;  Location: [facility] OR;  Service: General;  Laterality: N/A;    EXCISIONAL HEMORRHOIDECTOMY N/A 8/20/2023    Procedure: HEMORRHOIDECTOMY;  Surgeon: Michael Gusman DO;  Location: [facility] OR;  Service: General;  Laterality: N/A;     History reviewed. No pertinent  "family history.    OBJECTIVE:  BP (!) 160/95 (BP Location: Left arm, Patient Position: Lying)   Pulse 80   Temp 97.7 °F (36.5 °C) (Oral)   Resp 18   Ht 5' 5" (1.651 m)   Wt (!) 145.6 kg (321 lb)   SpO2 97%   BMI 53.42 kg/m²   GEN: chronically ill appearing, obese, cooperative, NAD  HEENT: NCAT, OP benign, MMM  NECK: Supple, no LAD  CV: normal rate, regular rhythm  RESP: CTA anteriorly, unlabored  ABD: NABS, ND, NT, soft, no guarding  EXT: No clubbing, cyanosis; +edema to the thighs   SKIN: Warm and dry  NEURO: AAO x4. Afocal.    LABS:  Recent Results (from the past 336 hour(s))   CBC with Differential    Collection Time: 08/23/23  4:23 AM   Result Value Ref Range    WBC 8.20 4.50 - 11.00 K/uL    Hemoglobin 7.6 (L) 13.5 - 18.0 g/dL    Hematocrit 22.6 (L) 40.0 - 54.0 %    Platelet Count 64 (L) 150 - 400 K/uL   CBC with Differential    Collection Time: 08/22/23  7:05 AM   Result Value Ref Range    WBC 10.14 4.50 - 11.00 K/uL    Hemoglobin 8.3 (L) 13.5 - 18.0 g/dL    Hematocrit 24.9 (L) 40.0 - 54.0 %    Platelet Count 73 (L) 150 - 400 K/uL   CBC with Differential    Collection Time: 08/21/23  9:31 AM   Result Value Ref Range    WBC 8.41 4.50 - 11.00 K/uL    Hemoglobin 7.1 (L) 13.5 - 18.0 g/dL    Hematocrit 21.8 (L) 40.0 - 54.0 %    Platelet Count 67 (L) 150 - 400 K/uL     CMP  Sodium   Date Value Ref Range Status   08/23/2023 135 (L) 136 - 145 mmol/L Final     Potassium   Date Value Ref Range Status   08/23/2023 3.5 3.5 - 5.1 mmol/L Final     Chloride   Date Value Ref Range Status   08/23/2023 103 98 - 107 mmol/L Final     CO2   Date Value Ref Range Status   08/23/2023 25 21 - 32 mmol/L Final     Glucose   Date Value Ref Range Status   08/23/2023 86 74 - 106 mg/dL Final     BUN   Date Value Ref Range Status   08/23/2023 21 (H) 7 - 18 mg/dL Final     Creatinine   Date Value Ref Range Status   08/23/2023 2.14 (H) 0.70 - 1.30 mg/dL Final     Calcium   Date Value Ref Range Status   08/23/2023 8.0 (L) 8.5 - 10.1 mg/dL " Final     Total Protein   Date Value Ref Range Status   08/23/2023 6.6 6.4 - 8.2 g/dL Final     Albumin   Date Value Ref Range Status   08/23/2023 1.9 (L) 3.5 - 5.0 g/dL Final     Bilirubin, Total   Date Value Ref Range Status   08/23/2023 7.6 (H) >0.0 - 1.2 mg/dL Final     Alk Phos   Date Value Ref Range Status   08/23/2023 114 45 - 115 U/L Final     AST   Date Value Ref Range Status   08/23/2023 53 (H) 15 - 37 U/L Final     ALT   Date Value Ref Range Status   08/23/2023 28 16 - 61 U/L Final     Anion Gap   Date Value Ref Range Status   08/23/2023 11 7 - 16 mmol/L Final     eGFR   Date Value Ref Range Status   08/23/2023 37 (L) >=60 mL/min/1.73m2 Final     Lab Results   Component Value Date    INR 1.95 08/23/2023    INR 1.87 08/22/2023    INR 2.01 08/21/2023       IMAGING:    Abd US  Nodular contour of the liver suspicious for cirrhosis. Small volume biliary sludge/stones.  There is nonspecific gallbladder wall thickening which can be seen in the setting of liver disease. Consider nuclear medicine hepatobiliary imaging study for further evaluation.  Borderline prominence of the common duct.  Correlate with lab values including bilirubin.  If clinically indicated, MRCP or ERCP may be of benefit for further evaluation.    ASSESSMENT:    47 yo obese AAM with longstanding EtOH dependence admitted with rectal bleeding found to have rectal varices vs IH.     PLAN:    Hematochezia  - s/p hemorrhoid banding and fulguration in OR  - bleeding per rectum today and had pRBC last night  - discussed with Dr. Gusman   - if he has additional bleeding or drop in H&H, will plan for flex sig tomorrow; otherwise, will do colonoscopy in the next 1-2 months as outpatient after healing       Compensated Cirrhosis   - Etiology: likely alcohol, NAFLD  - suggestive by radiographic appearance, thrombocytopenia  - Proceed with chronic liver workup: ordered; Echo pending   - Vaccinations: HAV immune; recommend HBV immunization     - MELD:  31    - Ascites: none  - Varices: no varices; repeat EGD 2y unless he decompensates   - Encephalopathy: none  - HCC Surveilance: no mass on US 8/2023  - Transplant candidate: encouraged alcohol abstinence; I did discuss with patient and wife that EtOH was related to liver disease and continued alcohol intake would lead to worsening of liver disease and that continued alcohol use may prevent him from getting a transplant in the future.  Currently uninsured but reports insurance will start on Sept 1; if liver functions worsens or he has a clinical decline, would consider discussion with tertiary facility regarding transfer for liver transplant evaluation      Hyperbilirubinemia  - borderline CBD on US, but MRCP with no CBD dilatation  - MRCP limited by motion artifact  - labs also reflective of alcoholic hepatitis with elevated MDFS; however, bili trended down today; if labs continue to trend down, will hold off on steroids       Thank you for the consult and including me in the care of this patient. Please call me with questions.     Real Ng MD  Gastroenterology

## 2023-08-23 NOTE — PROGRESS NOTES
[facility] - [facility] Hospital Medicine  Progress Note    Patient Name: John Spears  MRN: [id number]  Patient Class: IP- Inpatient   Admission Date: 8/19/2023  Length of Stay: 3 days  Attending Physician: Jamal Thomas Jr., MD  Primary Care Provider: Ruben Provider        Subjective:     Principal Problem:Bleeding hemorrhoid        HPI:  Patient is a 47 y/o male with PMH of HTN, HLD, cirrhosis of the liver and hemorrhoids who presents to the ED with complaint of COB and swelling of the abdomen, legs and feet that started about 1 month ago. Patient states that the swelling and SOB has been worsening for the last 2 weeks. Patient reports passing dark blood per rectum with bowel movement for the last 2 weeks. Patient reports having intermittent RUQ when he eats fried foot. Patient reports chills but no fever. Patient denies nausea, vomiting, chest pain or urinary changes. Patient reports drinking about 10 beers of 16 ounces per day for the 3 years and for the last months he stopped drinking beer and started drinking  liquor. He states that 1 fifth of liquor lasts for 3 days. Patient reports withdrawal symptoms in the past. Patient reports that he quit smoking cigarettes 5 years ago.      In the ED showed /67, HR 90, RR 16, SpO2 98% and afebrile. Blood work showed H/H 6.1/19, WBC 5.6, PLT 78, PT 21.2, INR 1.87, PTT 48.2. Sodium 134, potassium 2.8, Alkaline phosphatase 166, Total bilirrubin 6.5, AST/ALST 61/41, p-, troponin 16.7. UA negative for infection or blood. FOBT negative. EKG nsr with HR 89. Patient is receiving PRBC in the ER at this moment and electrolytes are being replenished. Patient will be admitted to the hospital for further management.       Overview/Hospital Course:  No notes on file    Interval History: 8/23: No acute complaints. MRCP revealed gallstones. Plan for flex sign with banding if rectal bleeding persists with Dr. Ng. Pt noted to  have significant B/L LE edema with hypoalbuminemia. Pt started on lasix and aldactone. Will monitor for improvement.    Review of Systems   Constitutional:  Positive for fatigue.   Respiratory:  Negative for chest tightness and shortness of breath.    Cardiovascular:  Negative for chest pain.   Gastrointestinal:  Negative for abdominal pain, diarrhea, nausea and vomiting.   Genitourinary:  Negative for dysuria.     Objective:     Vital Signs (Most Recent):  Temp: 98.1 °F (36.7 °C) (08/23/23 1100)  Pulse: 87 (08/23/23 1100)  Resp: 18 (08/23/23 1100)  BP: (!) 156/86 (08/23/23 1100)  SpO2: 97 % (08/23/23 1100) Vital Signs (24h Range):  Temp:  [97.7 °F (36.5 °C)-98.8 °F (37.1 °C)] 98.1 °F (36.7 °C)  Pulse:  [85-96] 87  Resp:  [9-18] 18  SpO2:  [95 %-100 %] 97 %  BP: (107-156)/(57-86) 156/86     Weight: (!) 145.6 kg (321 lb)  Body mass index is 53.42 kg/m².    Intake/Output Summary (Last 24 hours) at 8/23/2023 1221  Last data filed at 8/22/2023 1316  Gross per 24 hour   Intake 200 ml   Output --   Net 200 ml         Physical Exam  Vitals reviewed.   HENT:      Head: Normocephalic.   Cardiovascular:      Rate and Rhythm: Normal rate.   Pulmonary:      Effort: Pulmonary effort is normal. No respiratory distress.   Abdominal:      Palpations: Abdomen is soft.   Musculoskeletal:      Right lower leg: Edema present.      Left lower leg: Edema present.   Skin:     General: Skin is warm and dry.   Neurological:      Mental Status: He is alert. Mental status is at baseline.           Significant Labs: All pertinent labs within the past 24 hours have been reviewed.    Significant Imaging: I have reviewed all pertinent imaging results/findings within the past 24 hours.      Assessment/Plan:      * Bleeding hemorrhoid  Likely secondary to liver cirrhosis      8/21: General surgery--  To the OR for rectal exam under anesthesia with possible hemorrhoidectomy.       Risks and benefits explained with the patient including risks for  infection, bleeding, injury to surrounding structures, hematoma/seroma formation with need for possible evacuation, possible open.  The patient verbalized understanding, agrees and wishes to proceed with surgery.    If rectal bleeding persists, flex sig with banding with Dr. Ng      Hypertension  Holding home BB and HCTZ  Monitor BP    Alcoholic cirrhosis of liver  MELD score 22 points. 19.6% with estimated 3 month mortality  CIWA score 0  Last drink was 5 days ago   IV Ativan 1 mg Q1H PRN  Monitor AM CMP  Monitor signs of withdrawal     MRCP reveals cirrhosis and multiple gallstones      Anemia  Acute blood loss        Monitor CBC  No bleeding noted on EGD      Hypoalbuminemia  Likely from malturition + or - synthetic function of the liver  Start Spironolactone and lasix      VTE Risk Mitigation (From admission, onward)         Ordered     Reason for No Pharmacological VTE Prophylaxis  Once        Question:  Reasons:  Answer:  Active Bleeding    08/20/23 0043     IP VTE HIGH RISK PATIENT  Once         08/20/23 0043     Place sequential compression device  Until discontinued         08/20/23 0043                Discharge Planning   LUCRECIA:      Code Status: Full Code   Is the patient medically ready for discharge?:     Reason for patient still in hospital (select all that apply): Treatment  Discharge Plan A: Home with family                  Valencia Butcher MD  Department of Hospital Medicine   [facility] - [facility]

## 2023-08-23 NOTE — ASSESSMENT & PLAN NOTE
Likely secondary to liver cirrhosis      8/21: General surgery--  To the OR for rectal exam under anesthesia with possible hemorrhoidectomy.       Risks and benefits explained with the patient including risks for infection, bleeding, injury to surrounding structures, hematoma/seroma formation with need for possible evacuation, possible open.  The patient verbalized understanding, agrees and wishes to proceed with surgery.    If rectal bleeding persists, flex sig with banding with Dr. Ng

## 2023-08-24 ENCOUNTER — ANESTHESIA (OUTPATIENT)
Dept: SURGERY | Facility: HOSPITAL | Age: 48
DRG: 988 | End: 2023-08-24
Payer: COMMERCIAL

## 2023-08-24 ENCOUNTER — ANESTHESIA (OUTPATIENT)
Dept: GASTROENTEROLOGY | Facility: HOSPITAL | Age: 48
DRG: 988 | End: 2023-08-24
Payer: COMMERCIAL

## 2023-08-24 ENCOUNTER — ANESTHESIA EVENT (OUTPATIENT)
Dept: GASTROENTEROLOGY | Facility: HOSPITAL | Age: 48
DRG: 988 | End: 2023-08-24
Payer: COMMERCIAL

## 2023-08-24 ENCOUNTER — ANESTHESIA EVENT (OUTPATIENT)
Dept: SURGERY | Facility: HOSPITAL | Age: 48
DRG: 988 | End: 2023-08-24
Payer: COMMERCIAL

## 2023-08-24 VITALS
SYSTOLIC BLOOD PRESSURE: 146 MMHG | DIASTOLIC BLOOD PRESSURE: 68 MMHG | RESPIRATION RATE: 16 BRPM | HEART RATE: 89 BPM | OXYGEN SATURATION: 100 %

## 2023-08-24 PROBLEM — K62.5 ANORECTAL HEMORRHAGE: Status: ACTIVE | Noted: 2023-08-24

## 2023-08-24 LAB
ABO + RH BLD: NORMAL
ALBUMIN SERPL BCP-MCNC: 1.9 G/DL (ref 3.5–5)
ALBUMIN/GLOB SERPL: 0.4 {RATIO}
ALP SERPL-CCNC: 147 U/L (ref 45–115)
ALT SERPL W P-5'-P-CCNC: 26 U/L (ref 16–61)
ANA SER QL: NEGATIVE
ANION GAP SERPL CALCULATED.3IONS-SCNC: 11 MMOL/L (ref 7–16)
ANTI-MITOCHONDRIAL (M2) AB INDEX: 0.11
AST SERPL W P-5'-P-CCNC: 45 U/L (ref 15–37)
BASOPHILS # BLD AUTO: 0.06 K/UL (ref 0–0.2)
BASOPHILS NFR BLD AUTO: 0.7 % (ref 0–1)
BILIRUB DIRECT SERPL-MCNC: 3.6 MG/DL (ref 0–0.2)
BILIRUB SERPL-MCNC: 6.1 MG/DL (ref ?–1.2)
BLD PROD TYP BPU: NORMAL
BLOOD UNIT EXPIRATION DATE: NORMAL
BLOOD UNIT TYPE CODE: 5100
BLOOD UNIT TYPE CODE: 5100
BLOOD UNIT TYPE CODE: 8400
BUN SERPL-MCNC: 21 MG/DL (ref 7–18)
BUN/CREAT SERPL: 11 (ref 6–20)
CALCIUM SERPL-MCNC: 7.8 MG/DL (ref 8.5–10.1)
CHLORIDE SERPL-SCNC: 103 MMOL/L (ref 98–107)
CO2 SERPL-SCNC: 26 MMOL/L (ref 21–32)
CREAT SERPL-MCNC: 1.91 MG/DL (ref 0.7–1.3)
CROSSMATCH INTERPRETATION: NORMAL
DIFFERENTIAL METHOD BLD: ABNORMAL
DISPENSE STATUS: NORMAL
EGFR (NO RACE VARIABLE) (RUSH/TITUS): 42 ML/MIN/1.73M2
EOSINOPHIL # BLD AUTO: 0.34 K/UL (ref 0–0.5)
EOSINOPHIL NFR BLD AUTO: 4.2 % (ref 1–4)
ERYTHROCYTE [DISTWIDTH] IN BLOOD BY AUTOMATED COUNT: 20.4 % (ref 11.5–14.5)
GLOBULIN SER-MCNC: 4.7 G/DL (ref 2–4)
GLUCOSE SERPL-MCNC: 100 MG/DL (ref 74–106)
GROUP & RH: NORMAL
HCT VFR BLD AUTO: 22.1 % (ref 40–54)
HGB BLD-MCNC: 7.1 G/DL (ref 13.5–18)
HGB FRACT BLD ELPH-IMP: NEGATIVE
IMM GRANULOCYTES # BLD AUTO: 0.05 K/UL (ref 0–0.04)
IMM GRANULOCYTES NFR BLD: 0.6 % (ref 0–0.4)
INDIRECT COOMBS GEL: NORMAL
INR BLD: 2.01
LYMPHOCYTES # BLD AUTO: 0.68 K/UL (ref 1–4.8)
LYMPHOCYTES NFR BLD AUTO: 8.3 % (ref 27–41)
MCH RBC QN AUTO: 29.6 PG (ref 27–31)
MCHC RBC AUTO-ENTMCNC: 32.1 G/DL (ref 32–36)
MCV RBC AUTO: 92.1 FL (ref 80–96)
MONOCYTES # BLD AUTO: 1.61 K/UL (ref 0–0.8)
MONOCYTES NFR BLD AUTO: 19.7 % (ref 2–6)
MPC BLD CALC-MCNC: 11.4 FL (ref 9.4–12.4)
NEUTROPHILS # BLD AUTO: 5.45 K/UL (ref 1.8–7.7)
NEUTROPHILS NFR BLD AUTO: 66.5 % (ref 53–65)
NRBC # BLD AUTO: 0 X10E3/UL
NRBC, AUTO (.00): 0 %
PLATELET # BLD AUTO: 71 K/UL (ref 150–400)
POTASSIUM SERPL-SCNC: 3.7 MMOL/L (ref 3.5–5.1)
PROT SERPL-MCNC: 6.6 G/DL (ref 6.4–8.2)
PROTHROMBIN TIME: 22.4 SECONDS (ref 11.7–14.7)
RBC # BLD AUTO: 2.4 M/UL (ref 4.6–6.2)
SMOOTH MUSCLE AB SER QL IF: NEGATIVE
SODIUM SERPL-SCNC: 136 MMOL/L (ref 136–145)
SPECIMEN OUTDATE: NORMAL
UNIT NUMBER: NORMAL
WBC # BLD AUTO: 8.19 K/UL (ref 4.5–11)

## 2023-08-24 PROCEDURE — D9220A PRA ANESTHESIA: ICD-10-PCS | Mod: 59,ANES,, | Performed by: ANESTHESIOLOGY

## 2023-08-24 PROCEDURE — D9220A PRA ANESTHESIA: Mod: CRNA,,, | Performed by: NURSE ANESTHETIST, CERTIFIED REGISTERED

## 2023-08-24 PROCEDURE — 63600175 PHARM REV CODE 636 W HCPCS

## 2023-08-24 PROCEDURE — 63600175 PHARM REV CODE 636 W HCPCS: Performed by: STUDENT IN AN ORGANIZED HEALTH CARE EDUCATION/TRAINING PROGRAM

## 2023-08-24 PROCEDURE — P9016 RBC LEUKOCYTES REDUCED: HCPCS

## 2023-08-24 PROCEDURE — 11000001 HC ACUTE MED/SURG PRIVATE ROOM

## 2023-08-24 PROCEDURE — 25000003 PHARM REV CODE 250: Performed by: NURSE ANESTHETIST, CERTIFIED REGISTERED

## 2023-08-24 PROCEDURE — 63600175 PHARM REV CODE 636 W HCPCS: Performed by: NURSE ANESTHETIST, CERTIFIED REGISTERED

## 2023-08-24 PROCEDURE — C9113 INJ PANTOPRAZOLE SODIUM, VIA: HCPCS | Performed by: STUDENT IN AN ORGANIZED HEALTH CARE EDUCATION/TRAINING PROGRAM

## 2023-08-24 PROCEDURE — 37000008 HC ANESTHESIA 1ST 15 MINUTES: Performed by: STUDENT IN AN ORGANIZED HEALTH CARE EDUCATION/TRAINING PROGRAM

## 2023-08-24 PROCEDURE — 25000003 PHARM REV CODE 250

## 2023-08-24 PROCEDURE — 85014 HEMATOCRIT: CPT | Performed by: FAMILY MEDICINE

## 2023-08-24 PROCEDURE — D9220A PRA ANESTHESIA: ICD-10-PCS | Mod: 59,CRNA,, | Performed by: NURSE ANESTHETIST, CERTIFIED REGISTERED

## 2023-08-24 PROCEDURE — 82248 BILIRUBIN DIRECT: CPT | Performed by: INTERNAL MEDICINE

## 2023-08-24 PROCEDURE — 86923 COMPATIBILITY TEST ELECTRIC: CPT | Mod: 91 | Performed by: STUDENT IN AN ORGANIZED HEALTH CARE EDUCATION/TRAINING PROGRAM

## 2023-08-24 PROCEDURE — 25000003 PHARM REV CODE 250: Performed by: STUDENT IN AN ORGANIZED HEALTH CARE EDUCATION/TRAINING PROGRAM

## 2023-08-24 PROCEDURE — D9220A PRA ANESTHESIA: Mod: ANES,,, | Performed by: ANESTHESIOLOGY

## 2023-08-24 PROCEDURE — 88304 SURGICAL PATHOLOGY: ICD-10-PCS | Mod: 26,XU,, | Performed by: PATHOLOGY

## 2023-08-24 PROCEDURE — 99232 PR SUBSEQUENT HOSPITAL CARE,LEVL II: ICD-10-PCS | Mod: GC,,, | Performed by: FAMILY MEDICINE

## 2023-08-24 PROCEDURE — 99232 SBSQ HOSP IP/OBS MODERATE 35: CPT | Mod: GC,,, | Performed by: FAMILY MEDICINE

## 2023-08-24 PROCEDURE — 36000706: Performed by: STUDENT IN AN ORGANIZED HEALTH CARE EDUCATION/TRAINING PROGRAM

## 2023-08-24 PROCEDURE — P9016 RBC LEUKOCYTES REDUCED: HCPCS | Performed by: STUDENT IN AN ORGANIZED HEALTH CARE EDUCATION/TRAINING PROGRAM

## 2023-08-24 PROCEDURE — D9220A PRA ANESTHESIA: ICD-10-PCS | Mod: CRNA,,, | Performed by: NURSE ANESTHETIST, CERTIFIED REGISTERED

## 2023-08-24 PROCEDURE — 63600175 PHARM REV CODE 636 W HCPCS: Performed by: ANESTHESIOLOGY

## 2023-08-24 PROCEDURE — 27000716 HC OXISENSOR PROBE, ANY SIZE: Performed by: ANESTHESIOLOGY

## 2023-08-24 PROCEDURE — 85610 PROTHROMBIN TIME: CPT | Performed by: GENERAL PRACTICE

## 2023-08-24 PROCEDURE — 71000033 HC RECOVERY, INTIAL HOUR: Performed by: STUDENT IN AN ORGANIZED HEALTH CARE EDUCATION/TRAINING PROGRAM

## 2023-08-24 PROCEDURE — 85025 COMPLETE CBC W/AUTO DIFF WBC: CPT | Performed by: INTERNAL MEDICINE

## 2023-08-24 PROCEDURE — 36000707: Performed by: STUDENT IN AN ORGANIZED HEALTH CARE EDUCATION/TRAINING PROGRAM

## 2023-08-24 PROCEDURE — 37000009 HC ANESTHESIA EA ADD 15 MINS: Performed by: STUDENT IN AN ORGANIZED HEALTH CARE EDUCATION/TRAINING PROGRAM

## 2023-08-24 PROCEDURE — 46260 REMOVE IN/EX HEM GROUPS 2+: CPT | Mod: ,,, | Performed by: STUDENT IN AN ORGANIZED HEALTH CARE EDUCATION/TRAINING PROGRAM

## 2023-08-24 PROCEDURE — 27000284 HC CANNULA NASAL: Performed by: ANESTHESIOLOGY

## 2023-08-24 PROCEDURE — D9220A PRA ANESTHESIA: Mod: 59,CRNA,, | Performed by: NURSE ANESTHETIST, CERTIFIED REGISTERED

## 2023-08-24 PROCEDURE — P9017 PLASMA 1 DONOR FRZ W/IN 8 HR: HCPCS | Performed by: STUDENT IN AN ORGANIZED HEALTH CARE EDUCATION/TRAINING PROGRAM

## 2023-08-24 PROCEDURE — 86900 BLOOD TYPING SEROLOGIC ABO: CPT

## 2023-08-24 PROCEDURE — D9220A PRA ANESTHESIA: ICD-10-PCS | Mod: ANES,,, | Performed by: ANESTHESIOLOGY

## 2023-08-24 PROCEDURE — 46260 PR HEMORRHOIDECTOMY,INT/EXT, 2+ COLUMNS/GROUPS: ICD-10-PCS | Mod: ,,, | Performed by: STUDENT IN AN ORGANIZED HEALTH CARE EDUCATION/TRAINING PROGRAM

## 2023-08-24 PROCEDURE — 71000039 HC RECOVERY, EACH ADD'L HOUR: Performed by: STUDENT IN AN ORGANIZED HEALTH CARE EDUCATION/TRAINING PROGRAM

## 2023-08-24 PROCEDURE — C9290 INJ, BUPIVACAINE LIPOSOME: HCPCS | Performed by: STUDENT IN AN ORGANIZED HEALTH CARE EDUCATION/TRAINING PROGRAM

## 2023-08-24 PROCEDURE — 80053 COMPREHEN METABOLIC PANEL: CPT | Performed by: INTERNAL MEDICINE

## 2023-08-24 PROCEDURE — D9220A PRA ANESTHESIA: Mod: 59,ANES,, | Performed by: ANESTHESIOLOGY

## 2023-08-24 PROCEDURE — 27201423 OPTIME MED/SURG SUP & DEVICES STERILE SUPPLY: Performed by: STUDENT IN AN ORGANIZED HEALTH CARE EDUCATION/TRAINING PROGRAM

## 2023-08-24 PROCEDURE — 88304 TISSUE EXAM BY PATHOLOGIST: CPT | Mod: 26,XU,, | Performed by: PATHOLOGY

## 2023-08-24 PROCEDURE — 86923 COMPATIBILITY TEST ELECTRIC: CPT

## 2023-08-24 PROCEDURE — 88304 TISSUE EXAM BY PATHOLOGIST: CPT | Mod: TC,SUR | Performed by: STUDENT IN AN ORGANIZED HEALTH CARE EDUCATION/TRAINING PROGRAM

## 2023-08-24 RX ORDER — HYDROMORPHONE HYDROCHLORIDE 2 MG/ML
0.5 INJECTION, SOLUTION INTRAMUSCULAR; INTRAVENOUS; SUBCUTANEOUS EVERY 5 MIN PRN
Status: DISCONTINUED | OUTPATIENT
Start: 2023-08-24 | End: 2023-08-24 | Stop reason: HOSPADM

## 2023-08-24 RX ORDER — VECURONIUM BROMIDE FOR INJECTION 1 MG/ML
INJECTION, POWDER, LYOPHILIZED, FOR SOLUTION INTRAVENOUS
Status: DISCONTINUED | OUTPATIENT
Start: 2023-08-24 | End: 2023-08-24

## 2023-08-24 RX ORDER — ONDANSETRON 2 MG/ML
4 INJECTION INTRAMUSCULAR; INTRAVENOUS DAILY PRN
Status: DISCONTINUED | OUTPATIENT
Start: 2023-08-24 | End: 2023-08-24 | Stop reason: HOSPADM

## 2023-08-24 RX ORDER — ROCURONIUM BROMIDE 10 MG/ML
INJECTION, SOLUTION INTRAVENOUS
Status: DISCONTINUED | OUTPATIENT
Start: 2023-08-24 | End: 2023-08-24

## 2023-08-24 RX ORDER — SODIUM CHLORIDE, SODIUM LACTATE, POTASSIUM CHLORIDE, CALCIUM CHLORIDE 600; 310; 30; 20 MG/100ML; MG/100ML; MG/100ML; MG/100ML
125 INJECTION, SOLUTION INTRAVENOUS CONTINUOUS
Status: DISCONTINUED | OUTPATIENT
Start: 2023-08-24 | End: 2023-08-31

## 2023-08-24 RX ORDER — HYDROCODONE BITARTRATE AND ACETAMINOPHEN 500; 5 MG/1; MG/1
TABLET ORAL
Status: DISCONTINUED | OUTPATIENT
Start: 2023-08-24 | End: 2023-08-24 | Stop reason: HOSPADM

## 2023-08-24 RX ORDER — OXYCODONE HYDROCHLORIDE 5 MG/1
5 TABLET ORAL
Status: DISCONTINUED | OUTPATIENT
Start: 2023-08-24 | End: 2023-08-24 | Stop reason: HOSPADM

## 2023-08-24 RX ORDER — CEFAZOLIN SODIUM 1 G/3ML
INJECTION, POWDER, FOR SOLUTION INTRAMUSCULAR; INTRAVENOUS
Status: DISCONTINUED | OUTPATIENT
Start: 2023-08-24 | End: 2023-08-24

## 2023-08-24 RX ORDER — LIDOCAINE HYDROCHLORIDE 20 MG/ML
INJECTION, SOLUTION EPIDURAL; INFILTRATION; INTRACAUDAL; PERINEURAL
Status: DISCONTINUED | OUTPATIENT
Start: 2023-08-24 | End: 2023-08-24

## 2023-08-24 RX ORDER — HYDROCODONE BITARTRATE AND ACETAMINOPHEN 500; 5 MG/1; MG/1
TABLET ORAL
Status: DISCONTINUED | OUTPATIENT
Start: 2023-08-24 | End: 2023-08-27

## 2023-08-24 RX ORDER — PROPOFOL 10 MG/ML
VIAL (ML) INTRAVENOUS
Status: DISCONTINUED | OUTPATIENT
Start: 2023-08-24 | End: 2023-08-24

## 2023-08-24 RX ORDER — PHENYLEPHRINE HYDROCHLORIDE 10 MG/ML
INJECTION INTRAVENOUS
Status: DISCONTINUED | OUTPATIENT
Start: 2023-08-24 | End: 2023-08-24

## 2023-08-24 RX ORDER — MORPHINE SULFATE 10 MG/ML
4 INJECTION INTRAMUSCULAR; INTRAVENOUS; SUBCUTANEOUS EVERY 5 MIN PRN
Status: DISCONTINUED | OUTPATIENT
Start: 2023-08-24 | End: 2023-08-24 | Stop reason: HOSPADM

## 2023-08-24 RX ORDER — ONDANSETRON 2 MG/ML
INJECTION INTRAMUSCULAR; INTRAVENOUS
Status: DISCONTINUED | OUTPATIENT
Start: 2023-08-24 | End: 2023-08-24

## 2023-08-24 RX ORDER — FUROSEMIDE 10 MG/ML
20 INJECTION INTRAMUSCULAR; INTRAVENOUS
Status: DISCONTINUED | OUTPATIENT
Start: 2023-08-24 | End: 2023-09-06

## 2023-08-24 RX ORDER — METRONIDAZOLE 500 MG/100ML
INJECTION, SOLUTION INTRAVENOUS
Status: DISCONTINUED | OUTPATIENT
Start: 2023-08-24 | End: 2023-08-24

## 2023-08-24 RX ORDER — POLYETHYLENE GLYCOL 3350 17 G/17G
17 POWDER, FOR SOLUTION ORAL 2 TIMES DAILY
Status: DISCONTINUED | OUTPATIENT
Start: 2023-08-24 | End: 2023-08-26

## 2023-08-24 RX ORDER — SPIRONOLACTONE 25 MG/1
25 TABLET ORAL DAILY
Status: DISCONTINUED | OUTPATIENT
Start: 2023-08-24 | End: 2023-09-06

## 2023-08-24 RX ORDER — MEPERIDINE HYDROCHLORIDE 25 MG/ML
25 INJECTION INTRAMUSCULAR; INTRAVENOUS; SUBCUTANEOUS EVERY 10 MIN PRN
Status: DISCONTINUED | OUTPATIENT
Start: 2023-08-24 | End: 2023-08-24 | Stop reason: HOSPADM

## 2023-08-24 RX ORDER — DIPHENHYDRAMINE HYDROCHLORIDE 50 MG/ML
25 INJECTION INTRAMUSCULAR; INTRAVENOUS EVERY 6 HOURS PRN
Status: DISCONTINUED | OUTPATIENT
Start: 2023-08-24 | End: 2023-08-24 | Stop reason: HOSPADM

## 2023-08-24 RX ADMIN — FUROSEMIDE 20 MG: 10 INJECTION, SOLUTION INTRAVENOUS at 11:08

## 2023-08-24 RX ADMIN — PHENYLEPHRINE HYDROCHLORIDE 100 MCG: 10 INJECTION INTRAVENOUS at 03:08

## 2023-08-24 RX ADMIN — PROPOFOL 50 MG: 10 INJECTION, EMULSION INTRAVENOUS at 03:08

## 2023-08-24 RX ADMIN — PROPOFOL 150 MG: 10 INJECTION, EMULSION INTRAVENOUS at 04:08

## 2023-08-24 RX ADMIN — VECURONIUM BROMIDE 2 MG: 1 INJECTION, POWDER, LYOPHILIZED, FOR SOLUTION INTRAVENOUS at 05:08

## 2023-08-24 RX ADMIN — ONDANSETRON 4 MG: 2 INJECTION INTRAMUSCULAR; INTRAVENOUS at 05:08

## 2023-08-24 RX ADMIN — CEFAZOLIN 3 G: 1 INJECTION, POWDER, FOR SOLUTION INTRAMUSCULAR; INTRAVENOUS; PARENTERAL at 05:08

## 2023-08-24 RX ADMIN — SUGAMMADEX 200 MG: 100 INJECTION, SOLUTION INTRAVENOUS at 06:08

## 2023-08-24 RX ADMIN — LIDOCAINE HYDROCHLORIDE 50 MG: 20 INJECTION, SOLUTION INTRAVENOUS at 03:08

## 2023-08-24 RX ADMIN — THIAMINE HYDROCHLORIDE 500 MG: 100 INJECTION, SOLUTION INTRAMUSCULAR; INTRAVENOUS at 09:08

## 2023-08-24 RX ADMIN — PROPOFOL 50 MG: 10 INJECTION, EMULSION INTRAVENOUS at 04:08

## 2023-08-24 RX ADMIN — FUROSEMIDE 20 MG: 10 INJECTION, SOLUTION INTRAVENOUS at 09:08

## 2023-08-24 RX ADMIN — SODIUM CHLORIDE: 9 INJECTION, SOLUTION INTRAVENOUS at 04:08

## 2023-08-24 RX ADMIN — POLYETHYLENE GLYCOL 3350 17 G: 17 POWDER, FOR SOLUTION ORAL at 09:08

## 2023-08-24 RX ADMIN — PANTOPRAZOLE SODIUM 40 MG: 40 INJECTION, POWDER, FOR SOLUTION INTRAVENOUS at 09:08

## 2023-08-24 RX ADMIN — PANTOPRAZOLE SODIUM 40 MG: 40 INJECTION, POWDER, FOR SOLUTION INTRAVENOUS at 11:08

## 2023-08-24 RX ADMIN — BUPIVACAINE 266 MG: 13.3 INJECTION, SUSPENSION, LIPOSOMAL INFILTRATION at 06:08

## 2023-08-24 RX ADMIN — HYDROMORPHONE HYDROCHLORIDE 0.5 MG: 2 INJECTION, SOLUTION INTRAMUSCULAR; INTRAVENOUS; SUBCUTANEOUS at 07:08

## 2023-08-24 RX ADMIN — SODIUM CHLORIDE: 9 INJECTION, SOLUTION INTRAVENOUS at 02:08

## 2023-08-24 RX ADMIN — METRONIDAZOLE 500 MG: 500 INJECTION, SOLUTION INTRAVENOUS at 05:08

## 2023-08-24 RX ADMIN — SPIRONOLACTONE 25 MG: 25 TABLET ORAL at 09:08

## 2023-08-24 RX ADMIN — ROCURONIUM BROMIDE 50 MG: 10 INJECTION, SOLUTION INTRAVENOUS at 04:08

## 2023-08-24 RX ADMIN — FOLIC ACID 1 MG: 1 TABLET ORAL at 09:08

## 2023-08-24 NOTE — ANESTHESIA PROCEDURE NOTES
Intubation    Date/Time: 8/24/2023 4:49 PM    Performed by: Usman Carmona CRNA  Authorized by: Sam Saenz MD    Intubation:     Induction:  Intravenous    Intubated:  Postinduction    Mask Ventilation:  Easy mask    Attempts:  1    Attempted By:  CRNA    Method of Intubation:  Video laryngoscopy    Blade:  Glidescope 4    Laryngeal View Grade: Grade I - full view of cords      Difficult Airway Encountered?: No      Complications:  None    Airway Device:  Oral endotracheal tube    Airway Device Size:  7.5    Style/Cuff Inflation:  Cuffed    Inflation Amount (mL):  7    Tube secured:  23    Secured at:  The lips    Placement Verified By:  Capnometry    Complicating Factors:  Obesity and poor neck/head extension    Findings Post-Intubation:  BS equal bilateral and atraumatic/condition of teeth unchanged  Notes:      Pt Demonstrated comfortable head position prior to induction.  This comfortable, Neutral, Head position was maintained throughout Induction/Intubation.  Smooth, tolerated well

## 2023-08-24 NOTE — ANESTHESIA POSTPROCEDURE EVALUATION
Anesthesia Post Evaluation    Patient: John Spears    Procedure(s) Performed: * No procedures listed *    Final Anesthesia Type: general      Patient location during evaluation: PACU  Patient participation: Yes- Able to Participate  Level of consciousness: awake and sedated  Post-procedure vital signs: reviewed and stable  Pain management: adequate  Airway patency: patent    PONV status at discharge: No PONV  Anesthetic complications: no      Cardiovascular status: blood pressure returned to baseline  Respiratory status: unassisted  Hydration status: euvolemic  Follow-up not needed.          Vitals Value Taken Time   BP 98/52 08/24/23 1721   Temp 97 08/24/23 1721   Pulse 89 08/24/23 1721   Resp 12 08/24/23 1721   SpO2 94 08/24/23 1721         No case tracking events are documented in the log.      Pain/Phoebe Score: Pain Rating Prior to Med Admin: 5 (8/23/2023  9:16 PM)  Pain Rating Post Med Admin: 3 (8/23/2023 10:16 PM)

## 2023-08-24 NOTE — ASSESSMENT & PLAN NOTE
Likely secondary to liver cirrhosis    Per Dr. Gusman:  Active arterial bleeding on endoscopy in anal canal; too friable for clip. (In OR) Arterial bleeding at the 9 o'clock position in the anal canal; multiple bleeding hemorrhoidal plexus at 6 o'clock position on the posterior canal and at 3 o'clock position    Patient received 2 units of packed red blood cells in the OR and we will give FFP

## 2023-08-24 NOTE — PLAN OF CARE
Chart reviewed. Plan for flexibily sigmoidoscopy per Dr. Ng today. Patient receiving one unit PRBCs today. SS following for discharge needs as arise.

## 2023-08-24 NOTE — TRANSFER OF CARE
"Anesthesia Transfer of Care Note    Patient: John Spears    Procedure(s) Performed: Procedure(s) (LRB):  EXAM UNDER ANESTHESIA, DIGITAL, RECTUM (N/A)  RETURN TO OPERATING ROOM, FOR MINOR POSTOPERATIVE HEMORRHAGE CONTROL  HEMORRHOIDECTOMY (N/A)    Patient location: PACU    Anesthesia Type: general    Transport from OR: Transported from OR on 6-10 L/min O2 by face mask with adequate spontaneous ventilation    Post pain: adequate analgesia    Post assessment: no apparent anesthetic complications    Post vital signs: stable    Level of consciousness: responds to stimulation and awake    Nausea/Vomiting: no nausea/vomiting    Complications: none    Transfer of care protocol was followedComments: Good SV continue, NAD noted, VSS, RTRN      Last vitals:   Visit Vitals  /67 (BP Location: Right forearm, Patient Position: Lying)   Pulse 91   Temp 36.5 °C (97.7 °F)   Resp 13   Ht 5' 5" (1.651 m)   Wt (!) 145.6 kg (321 lb)   SpO2 99%   BMI 53.42 kg/m²     "

## 2023-08-24 NOTE — PROGRESS NOTES
Active arterial bleeding on endoscopy in anal canal; too friable for clip.    To the OR for Rectal Exam under anesthesia with vessel ligation.    Discussed with family member; telephone consent obtained.

## 2023-08-24 NOTE — ANESTHESIA PREPROCEDURE EVALUATION
08/24/2023  John Spears is a 48 y.o., male.      Pre-op Assessment    I have reviewed the Patient Summary Reports.     I have reviewed the Nursing Notes. I have reviewed the NPO Status.   I have reviewed the Medications.     Review of Systems  Anesthesia Hx:  No problems with previous Anesthesia    Social:  Non-Smoker, Alcohol Use History ETOH abuse   Hematology/Oncology:         -- Anemia:   Cardiovascular:   Hypertension ECG has been reviewed.    Hepatic/GI:   Liver Disease,    Endocrine:  Morbid Obesity / BMI > 40      Physical Exam  General: Well nourished    Airway:  Mallampati: III / III  Mouth Opening: Normal  TM Distance: Normal  Tongue: Normal  Neck ROM: Normal ROM    Dental:  Intact    Chest/Lungs:  Clear to auscultation, Normal Respiratory Rate    Heart:  Rate: Normal  Rhythm: Regular Rhythm        Anesthesia Plan  Type of Anesthesia, risks & benefits discussed:    Anesthesia Type: Gen Natural Airway  Intra-op Monitoring Plan: Standard ASA Monitors  Post Op Pain Control Plan: IV/PO Opioids PRN  Induction:  IV  Informed Consent: Informed consent signed with the Patient and all parties understand the risks and agree with anesthesia plan.  All questions answered. Patient consented to blood products? Yes  ASA Score: 3  Day of Surgery Review of History & Physical: H&P Update referred to the surgeon/provider.I have interviewed and examined the patient. I have reviewed the patient's H&P dated: There are no significant changes. H&P completed by Anesthesiologist.    Ready For Surgery From Anesthesia Perspective.     .

## 2023-08-24 NOTE — SUBJECTIVE & OBJECTIVE
Interval History: 8/24: Pt examined at bedside. Pt was noted to be lethargic with fatigue. He had significant rectal bleeding. Hgb dropped from 8 to 7.1. Pt received 2U PRBC. Pt was noted to have active arterial bleed during endoscopy. Pt was later taken to OR for rectal exam and vessel ligation.     Review of Systems   Constitutional:  Positive for fatigue.   Respiratory:  Negative for chest tightness and shortness of breath.    Cardiovascular:  Negative for chest pain.   Gastrointestinal:  Negative for abdominal pain, diarrhea, nausea and vomiting.   Genitourinary:  Negative for dysuria.     Objective:     Vital Signs (Most Recent):  Temp: 97.7 °F (36.5 °C) (08/24/23 1445)  Pulse: 94 (08/24/23 1445)  Resp: 18 (08/24/23 1445)  BP: (!) 153/75 (08/24/23 1445)  SpO2: 98 % (08/24/23 1445) Vital Signs (24h Range):  Temp:  [97.7 °F (36.5 °C)-98.4 °F (36.9 °C)] 97.7 °F (36.5 °C)  Pulse:  [] 94  Resp:  [18-20] 18  SpO2:  [98 %-100 %] 98 %  BP: (130-153)/(73-87) 153/75     Weight: (!) 145.6 kg (321 lb)  Body mass index is 53.42 kg/m².    Intake/Output Summary (Last 24 hours) at 8/24/2023 1800  Last data filed at 8/24/2023 1738  Gross per 24 hour   Intake 300 ml   Output 75 ml   Net 225 ml         Physical Exam  Vitals reviewed.   HENT:      Mouth/Throat:      Mouth: Mucous membranes are moist.   Cardiovascular:      Rate and Rhythm: Normal rate.   Pulmonary:      Effort: Pulmonary effort is normal. No respiratory distress.   Abdominal:      General: There is distension.   Skin:     General: Skin is warm and dry.   Neurological:      Mental Status: He is alert. Mental status is at baseline.             Significant Labs: All pertinent labs within the past 24 hours have been reviewed.    Significant Imaging: I have reviewed all pertinent imaging results/findings within the past 24 hours.

## 2023-08-24 NOTE — NURSING
Family called this nurse in the room pt has a steady trickle of bright red blood coming from rectum, Dr. Pruett notified, he instructed this nurse to notify Dr. Gusman, Dr. Gusman notified, order for stat h and h, if less than 7 give 1 unit of prbcs, and notify gi for possible scope tonight    2200- h and h results 8.4 and 26.1, did not meet criteria to notify GI tonight, vitamin k given iv per order from Dr. Ng, no further complaints from pt at this time, small amount of blood noted on pad under pt, pt cleansed and new pad applied

## 2023-08-24 NOTE — ASSESSMENT & PLAN NOTE
Acute blood loss     8/24: Hgb dropped to 7.1 with active bleeding. Patient received 2 units of packed red blood cells in the OR and we will give FF

## 2023-08-24 NOTE — SUBJECTIVE & OBJECTIVE
Interval History:   Rectal bleeding started last night. H&H 7.1/26.1.  PRBCs ordered.  Planned for flex sig with banding by Dr. Ng today. If bleeding persists following procedure, would need transfer to hepatobiliary specialist for higher level of care.    Medications:  Continuous Infusions:      Scheduled Meds:   folic acid  1 mg Oral Daily    furosemide (LASIX) injection  20 mg Intravenous Q12H    pantoprazole  40 mg Intravenous BID    polyethylene glycol  17 g Oral BID    potassium bicarbonate  40 mEq Oral Once    spironolactone  25 mg Oral Daily    thiamine (B-1) 500 mg in dextrose 5 % (D5W) 100 mL IVPB  500 mg Intravenous TID     PRN Meds:0.9%  NaCl infusion (for blood administration), 0.9%  NaCl infusion (for blood administration), 0.9%  NaCl infusion (for blood administration), 0.9%  NaCl infusion (for blood administration), acetaminophen, HYDROmorphone, LIDOcaine HCl 2%, lorazepam, melatonin, naloxone, ondansetron, ondansetron, oxyCODONE-acetaminophen, prochlorperazine, simethicone, sodium chloride 0.9%     Review of patient's allergies indicates:  No Known Allergies  Objective:     Vital Signs (Most Recent):  Temp: 98.2 °F (36.8 °C) (08/24/23 1100)  Pulse: 95 (08/24/23 1100)  Resp: 18 (08/24/23 1100)  BP: 132/73 (08/24/23 1100)  SpO2: 98 % (08/24/23 1100) Vital Signs (24h Range):  Temp:  [97.7 °F (36.5 °C)-98.4 °F (36.9 °C)] 98.2 °F (36.8 °C)  Pulse:  [] 95  Resp:  [18-20] 18  SpO2:  [97 %-100 %] 98 %  BP: (130-160)/(73-95) 132/73     Weight: (!) 145.6 kg (321 lb)  Body mass index is 53.42 kg/m².    Intake/Output - Last 3 Shifts         08/22 0700 08/23 0659 08/23 0700 08/24 0659 08/24 0700 08/25 0659    I.V. (mL/kg) 200 (1.4)      Blood       Total Intake(mL/kg) 200 (1.4)      Net +200                      Physical Exam  Vitals reviewed.   Cardiovascular:      Rate and Rhythm: Normal rate.   Pulmonary:      Effort: Pulmonary effort is normal. No respiratory distress.   Skin:     General: Skin  is warm and dry.   Neurological:      Mental Status: He is alert. Mental status is at baseline.          Significant Labs:  I have reviewed all pertinent lab results within the past 24 hours.  CBC:   Recent Labs   Lab 08/24/23 0434   WBC 8.19   RBC 2.40*   HGB 7.1*   HCT 22.1*   PLT 71*   MCV 92.1   MCH 29.6   MCHC 32.1       CMP:   Recent Labs   Lab 08/24/23 0434      CALCIUM 7.8*   ALBUMIN 1.9*   PROT 6.6      K 3.7   CO2 26      BUN 21*   CREATININE 1.91*   ALKPHOS 147*   ALT 26   AST 45*   BILITOT 6.1*         Significant Diagnostics:  I have reviewed all pertinent imaging results/findings within the past 24 hours.

## 2023-08-24 NOTE — OP NOTE
[facility] - Periop Services  Surgery Department  Operative Note    SUMMARY     Date of Procedure: 8/24/2023     Procedure: Procedure(s) (LRB):  EXAM UNDER ANESTHESIA, DIGITAL, RECTUM (N/A)  RETURN TO OPERATING ROOM, FOR MINOR POSTOPERATIVE HEMORRHAGE CONTROL  HEMORRHOIDECTOMY (N/A)     Surgeon(s) and Role:     * Michael Gusman DO - Primary    Assisting Surgeon: None    Pre-Operative Diagnosis: Bleeding hemorrhoid [K64.9]    Post-Operative Diagnosis: Post-Op Diagnosis Codes:     * Bleeding hemorrhoid [K64.9]    Anesthesia: General    Operative Findings (including complications, if any):  Arterial bleeding at the 9 o'clock position in the anal canal; multiple bleeding hemorrhoidal plexus at 6 o'clock position on the posterior canal and at 3 o'clock position    Description of Technical Procedures:  Patient was taken the operating room and placed on the operating table in the lithotomy position.  Patient underwent general anesthesia per the anesthesia team.  The rectum was then prepped and draped in usual sterile fashion.  We placed a speculum into the anus and there was a large gush of blood and gas.  We suctioned clear and identified an arterial vessel that was bleeding in the anal canal at the 9 o'clock position on the right lateral wall; we placed a 2-0 Vicryl figure-of-eight suture to ligate this.  The entire anal canal was very friable and there was increased venous bleeding; there was external hemorrhoid with possible mixture of internal hemorrhoids at the 6 o'clock position and 3 o'clock position; we decided to completely excise these.  The left lateral hemorrhoidal plexus we ligated with a 2-0 Vicryl suture at its pedicle and then performed a hemorrhoidectomy, excising the tissue with the handheld LigaSure.  We then closed the defect with a 2-0 inner locking running chromic suture.  We then ligated the pedicle at the 6 o'clock position with a 2-0 Vicryl figure-of-eight suture and then transected  is posterior hemorrhoidal plexus with handheld LigaSure and closed the defect with a 2-0 chromic locking running suture.  There was still some venous bleeding from multiple areas around the anal canal where there were not any hemorrhoidal plexuses likely due to portal hypertension; we fulgurated and got control this bleeding with cautery.  All Ray-Kirby sponges were removed.  We then injected the area with Exparel and then placed a thrombin soaked Gelfoam into the anal canal and packed with 4 x 4 gauze and tape.  Patient received 2 units of packed red blood cells in the OR and we will give FFP.  Patient awakened, extubated and taken the PACU in stable condition.  Patient tolerated procedure well and hemodynamically stable throughout the case.        Estimated Blood Loss (EBL): 200 mL           Implants: * No implants in log *    Specimens:   Specimen (24h ago, onward)       Start     Ordered    08/24/23 1738  Surgical Pathology  RELEASE UPON ORDERING         08/24/23 1738                            Condition: Stable    Disposition: PACU - hemodynamically stable.    Attestation: I was present and scrubbed for the entire procedure.
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
## 2023-08-24 NOTE — PROGRESS NOTES
[facility] - Periop Services Hospital Medicine  Progress Note    Patient Name: John Spears  MRN: [id number]  Patient Class: IP- Inpatient   Admission Date: 8/19/2023  Length of Stay: 4 days  Attending Physician: Jamal Thomas Jr., MD  Primary Care Provider: Ruben, Provider        Subjective:     Principal Problem:Bleeding hemorrhoid        HPI:  Patient is a 47 y/o male with PMH of HTN, HLD, cirrhosis of the liver and hemorrhoids who presents to the ED with complaint of COB and swelling of the abdomen, legs and feet that started about 1 month ago. Patient states that the swelling and SOB has been worsening for the last 2 weeks. Patient reports passing dark blood per rectum with bowel movement for the last 2 weeks. Patient reports having intermittent RUQ when he eats fried foot. Patient reports chills but no fever. Patient denies nausea, vomiting, chest pain or urinary changes. Patient reports drinking about 10 beers of 16 ounces per day for the 3 years and for the last months he stopped drinking beer and started drinking  liquor. He states that 1 fifth of liquor lasts for 3 days. Patient reports withdrawal symptoms in the past. Patient reports that he quit smoking cigarettes 5 years ago.      In the ED showed /67, HR 90, RR 16, SpO2 98% and afebrile. Blood work showed H/H 6.1/19, WBC 5.6, PLT 78, PT 21.2, INR 1.87, PTT 48.2. Sodium 134, potassium 2.8, Alkaline phosphatase 166, Total bilirrubin 6.5, AST/ALST 61/41, p-, troponin 16.7. UA negative for infection or blood. FOBT negative. EKG nsr with HR 89. Patient is receiving PRBC in the ER at this moment and electrolytes are being replenished. Patient will be admitted to the hospital for further management.       Overview/Hospital Course:  No notes on file    Interval History: 8/24: Pt examined at bedside. Pt was noted to be lethargic with fatigue. He had significant rectal bleeding. Hgb dropped from 8 to 7.1. Pt received 2U  PRBC. Pt was noted to have active arterial bleed during endoscopy. Pt was later taken to OR for rectal exam and vessel ligation.     Review of Systems   Constitutional:  Positive for fatigue.   Respiratory:  Negative for chest tightness and shortness of breath.    Cardiovascular:  Negative for chest pain.   Gastrointestinal:  Negative for abdominal pain, diarrhea, nausea and vomiting.   Genitourinary:  Negative for dysuria.     Objective:     Vital Signs (Most Recent):  Temp: 97.7 °F (36.5 °C) (08/24/23 1445)  Pulse: 94 (08/24/23 1445)  Resp: 18 (08/24/23 1445)  BP: (!) 153/75 (08/24/23 1445)  SpO2: 98 % (08/24/23 1445) Vital Signs (24h Range):  Temp:  [97.7 °F (36.5 °C)-98.4 °F (36.9 °C)] 97.7 °F (36.5 °C)  Pulse:  [] 94  Resp:  [18-20] 18  SpO2:  [98 %-100 %] 98 %  BP: (130-153)/(73-87) 153/75     Weight: (!) 145.6 kg (321 lb)  Body mass index is 53.42 kg/m².    Intake/Output Summary (Last 24 hours) at 8/24/2023 1800  Last data filed at 8/24/2023 1738  Gross per 24 hour   Intake 300 ml   Output 75 ml   Net 225 ml         Physical Exam  Vitals reviewed.   HENT:      Mouth/Throat:      Mouth: Mucous membranes are moist.   Cardiovascular:      Rate and Rhythm: Normal rate.   Pulmonary:      Effort: Pulmonary effort is normal. No respiratory distress.   Abdominal:      General: There is distension.   Skin:     General: Skin is warm and dry.   Neurological:      Mental Status: He is alert. Mental status is at baseline.             Significant Labs: All pertinent labs within the past 24 hours have been reviewed.    Significant Imaging: I have reviewed all pertinent imaging results/findings within the past 24 hours.      Assessment/Plan:      * Bleeding hemorrhoid  Likely secondary to liver cirrhosis    Per Dr. Gusman:  Active arterial bleeding on endoscopy in anal canal; too friable for clip. (In OR) Arterial bleeding at the 9 o'clock position in the anal canal; multiple bleeding hemorrhoidal plexus at 6 o'clock  position on the posterior canal and at 3 o'clock position    Patient received 2 units of packed red blood cells in the OR and we will give FFP    Acute lower GI bleeding    Patient received 2 units of packed red blood cells in the OR and we will give FFP    Hypertension  Holding home BB and HCTZ  Monitor BP    Alcoholic cirrhosis of liver  MELD score 22 points. 19.6% with estimated 3 month mortality  CIWA score 0  Last drink was 5 days ago   IV Ativan 1 mg Q1H PRN  Monitor AM CMP  Monitor signs of withdrawal     MRCP reveals cirrhosis and multiple gallstones      Anemia  Acute blood loss     8/24: Hgb dropped to 7.1 with active bleeding. Patient received 2 units of packed red blood cells in the OR and we will give FF      Hypoalbuminemia  Likely from malturition + or - synthetic function of the liver  Start Spironolactone and lasix      VTE Risk Mitigation (From admission, onward)         Ordered     Reason for No Pharmacological VTE Prophylaxis  Once        Question:  Reasons:  Answer:  Active Bleeding    08/20/23 0043     IP VTE HIGH RISK PATIENT  Once         08/20/23 0043     Place sequential compression device  Until discontinued         08/20/23 0043                Discharge Planning   LUCRECIA:      Code Status: Full Code   Is the patient medically ready for discharge?:     Reason for patient still in hospital (select all that apply): Treatment  Discharge Plan A: Home with family                  Valencia Butcher MD  Department of Hospital Medicine   [facility] - [facility]

## 2023-08-25 PROBLEM — E72.20 HYPERAMMONEMIA: Status: ACTIVE | Noted: 2023-08-25

## 2023-08-25 LAB
ALBUMIN SERPL BCP-MCNC: 1.9 G/DL (ref 3.5–5)
ALBUMIN/GLOB SERPL: 0.4 {RATIO}
ALP SERPL-CCNC: 100 U/L (ref 45–115)
ALT SERPL W P-5'-P-CCNC: 25 U/L (ref 16–61)
AMMONIA PLAS-SCNC: 97 ΜMOL/L (ref 11–32)
ANION GAP SERPL CALCULATED.3IONS-SCNC: 12 MMOL/L (ref 7–16)
ANISOCYTOSIS BLD QL SMEAR: ABNORMAL
AST SERPL W P-5'-P-CCNC: 45 U/L (ref 15–37)
BASOPHILS # BLD AUTO: 0.04 K/UL (ref 0–0.2)
BASOPHILS NFR BLD AUTO: 0.5 % (ref 0–1)
BILIRUB SERPL-MCNC: 6.4 MG/DL (ref ?–1.2)
BUN SERPL-MCNC: 21 MG/DL (ref 7–18)
BUN/CREAT SERPL: 14 (ref 6–20)
CALCIUM SERPL-MCNC: 8.2 MG/DL (ref 8.5–10.1)
CHLORIDE SERPL-SCNC: 104 MMOL/L (ref 98–107)
CO2 SERPL-SCNC: 26 MMOL/L (ref 21–32)
CREAT SERPL-MCNC: 1.52 MG/DL (ref 0.7–1.3)
DIFFERENTIAL METHOD BLD: ABNORMAL
EGFR (NO RACE VARIABLE) (RUSH/TITUS): 55 ML/MIN/1.73M2
EOSINOPHIL # BLD AUTO: 0.06 K/UL (ref 0–0.5)
EOSINOPHIL NFR BLD AUTO: 0.8 % (ref 1–4)
ERYTHROCYTE [DISTWIDTH] IN BLOOD BY AUTOMATED COUNT: 22.2 % (ref 11.5–14.5)
GLOBULIN SER-MCNC: 4.7 G/DL (ref 2–4)
GLUCOSE SERPL-MCNC: 88 MG/DL (ref 74–106)
HCT VFR BLD AUTO: 23.6 % (ref 40–54)
HCT VFR BLD AUTO: 23.8 % (ref 40–54)
HGB BLD-MCNC: 7.8 G/DL (ref 13.5–18)
HGB BLD-MCNC: 7.9 G/DL (ref 13.5–18)
HYPOCHROMIA BLD QL SMEAR: ABNORMAL
IMM GRANULOCYTES # BLD AUTO: 0.06 K/UL (ref 0–0.04)
IMM GRANULOCYTES NFR BLD: 0.8 % (ref 0–0.4)
INR BLD: 1.9
LYMPHOCYTES # BLD AUTO: 0.68 K/UL (ref 1–4.8)
LYMPHOCYTES NFR BLD AUTO: 9.2 % (ref 27–41)
MCH RBC QN AUTO: 28.7 PG (ref 27–31)
MCHC RBC AUTO-ENTMCNC: 32.8 G/DL (ref 32–36)
MCV RBC AUTO: 87.5 FL (ref 80–96)
MONOCYTES # BLD AUTO: 1.23 K/UL (ref 0–0.8)
MONOCYTES NFR BLD AUTO: 16.6 % (ref 2–6)
MPC BLD CALC-MCNC: 11.7 FL (ref 9.4–12.4)
NEUTROPHILS # BLD AUTO: 5.33 K/UL (ref 1.8–7.7)
NEUTROPHILS NFR BLD AUTO: 72.1 % (ref 53–65)
NRBC # BLD AUTO: 0 X10E3/UL
NRBC, AUTO (.00): 0 %
PLATELET # BLD AUTO: 66 K/UL (ref 150–400)
PLATELET MORPHOLOGY: ABNORMAL
POLYCHROMASIA BLD QL SMEAR: ABNORMAL
POTASSIUM SERPL-SCNC: 3.7 MMOL/L (ref 3.5–5.1)
PROT SERPL-MCNC: 6.6 G/DL (ref 6.4–8.2)
PROTHROMBIN TIME: 21.5 SECONDS (ref 11.7–14.7)
RBC # BLD AUTO: 2.72 M/UL (ref 4.6–6.2)
SODIUM SERPL-SCNC: 138 MMOL/L (ref 136–145)
WBC # BLD AUTO: 7.4 K/UL (ref 4.5–11)

## 2023-08-25 PROCEDURE — 25000003 PHARM REV CODE 250

## 2023-08-25 PROCEDURE — 80053 COMPREHEN METABOLIC PANEL: CPT | Performed by: STUDENT IN AN ORGANIZED HEALTH CARE EDUCATION/TRAINING PROGRAM

## 2023-08-25 PROCEDURE — 99232 SBSQ HOSP IP/OBS MODERATE 35: CPT | Mod: GC,,, | Performed by: FAMILY MEDICINE

## 2023-08-25 PROCEDURE — 25000003 PHARM REV CODE 250: Performed by: STUDENT IN AN ORGANIZED HEALTH CARE EDUCATION/TRAINING PROGRAM

## 2023-08-25 PROCEDURE — C9113 INJ PANTOPRAZOLE SODIUM, VIA: HCPCS | Performed by: STUDENT IN AN ORGANIZED HEALTH CARE EDUCATION/TRAINING PROGRAM

## 2023-08-25 PROCEDURE — 82140 ASSAY OF AMMONIA: CPT

## 2023-08-25 PROCEDURE — 85025 COMPLETE CBC W/AUTO DIFF WBC: CPT | Performed by: STUDENT IN AN ORGANIZED HEALTH CARE EDUCATION/TRAINING PROGRAM

## 2023-08-25 PROCEDURE — 85610 PROTHROMBIN TIME: CPT | Performed by: STUDENT IN AN ORGANIZED HEALTH CARE EDUCATION/TRAINING PROGRAM

## 2023-08-25 PROCEDURE — 63600175 PHARM REV CODE 636 W HCPCS

## 2023-08-25 PROCEDURE — 63600175 PHARM REV CODE 636 W HCPCS: Performed by: STUDENT IN AN ORGANIZED HEALTH CARE EDUCATION/TRAINING PROGRAM

## 2023-08-25 PROCEDURE — 11000001 HC ACUTE MED/SURG PRIVATE ROOM

## 2023-08-25 PROCEDURE — 99232 PR SUBSEQUENT HOSPITAL CARE,LEVL II: ICD-10-PCS | Mod: GC,,, | Performed by: FAMILY MEDICINE

## 2023-08-25 RX ORDER — PANTOPRAZOLE SODIUM 40 MG/10ML
40 INJECTION, POWDER, LYOPHILIZED, FOR SOLUTION INTRAVENOUS DAILY
Status: DISCONTINUED | OUTPATIENT
Start: 2023-08-26 | End: 2023-09-05

## 2023-08-25 RX ADMIN — FUROSEMIDE 20 MG: 10 INJECTION, SOLUTION INTRAVENOUS at 08:08

## 2023-08-25 RX ADMIN — THIAMINE HYDROCHLORIDE 500 MG: 100 INJECTION, SOLUTION INTRAMUSCULAR; INTRAVENOUS at 09:08

## 2023-08-25 RX ADMIN — PANTOPRAZOLE SODIUM 40 MG: 40 INJECTION, POWDER, FOR SOLUTION INTRAVENOUS at 08:08

## 2023-08-25 RX ADMIN — RIFAXIMIN 550 MG: 550 TABLET ORAL at 08:08

## 2023-08-25 RX ADMIN — FOLIC ACID 1 MG: 1 TABLET ORAL at 08:08

## 2023-08-25 RX ADMIN — POLYETHYLENE GLYCOL 3350 17 G: 17 POWDER, FOR SOLUTION ORAL at 08:08

## 2023-08-25 RX ADMIN — OXYCODONE AND ACETAMINOPHEN 1 TABLET: 10; 325 TABLET ORAL at 08:08

## 2023-08-25 RX ADMIN — THIAMINE HYDROCHLORIDE 500 MG: 100 INJECTION, SOLUTION INTRAMUSCULAR; INTRAVENOUS at 08:08

## 2023-08-25 RX ADMIN — THIAMINE HYDROCHLORIDE 500 MG: 100 INJECTION, SOLUTION INTRAMUSCULAR; INTRAVENOUS at 03:08

## 2023-08-25 RX ADMIN — RIFAXIMIN 550 MG: 550 TABLET ORAL at 11:08

## 2023-08-25 RX ADMIN — SPIRONOLACTONE 25 MG: 25 TABLET ORAL at 08:08

## 2023-08-25 NOTE — ANESTHESIA PREPROCEDURE EVALUATION
08/24/2023  John Spears is a 48 y.o., male.      Pre-op Assessment    I have reviewed the Patient Summary Reports.     I have reviewed the Nursing Notes. I have reviewed the NPO Status.   I have reviewed the Medications.     Review of Systems  Anesthesia Hx:  No problems with previous Anesthesia    Social:  Non-Smoker, No Alcohol Use History ETOH abuse   Hematology/Oncology:  Hematology Normal   Oncology Normal     EENT/Dental:EENT/Dental Normal   Cardiovascular:   Hypertension ECG has been reviewed.    Pulmonary:  Pulmonary Normal    Renal/:  Renal/ Normal     Hepatic/GI:   Liver Disease, Gi bleed   Musculoskeletal:  Musculoskeletal Normal    Neurological:  Neurology Normal    Endocrine:  Endocrine Normal  Morbid Obesity / BMI > 40  Dermatological:  Skin Normal    Psych:  Psychiatric Normal           Physical Exam  General: Well nourished    Airway:  Mallampati: III / III  Mouth Opening: Normal  TM Distance: Normal  Tongue: Normal  Neck ROM: Normal ROM    Dental:  Intact    Chest/Lungs:  Clear to auscultation, Normal Respiratory Rate    Heart:  Rate: Normal  Rhythm: Regular Rhythm        Anesthesia Plan  Type of Anesthesia, risks & benefits discussed:    Anesthesia Type: Gen Natural Airway  Intra-op Monitoring Plan: Standard ASA Monitors  Post Op Pain Control Plan: IV/PO Opioids PRN  Induction:  IV  Informed Consent: Informed consent signed with the Patient and all parties understand the risks and agree with anesthesia plan.  All questions answered. Patient consented to blood products? Yes  ASA Score: 3  Day of Surgery Review of History & Physical: H&P Update referred to the surgeon/provider.I have interviewed and examined the patient. I have reviewed the patient's H&P dated: There are no significant changes. H&P completed by Anesthesiologist.    Ready For Surgery From Anesthesia Perspective.      .

## 2023-08-25 NOTE — SUBJECTIVE & OBJECTIVE
Interval History: 8/25:  On 8/24 patient taken for rectal exam with anesthesia with hemorrhoidectomy and ligation. No blood loss noted today. Pt was more alert on presentation but noted to have a tremor. Ammonia level was elevated and pt was started on rifaxmin.    Review of Systems   Constitutional:  Negative for fatigue.   Respiratory:  Negative for chest tightness and shortness of breath.    Cardiovascular:  Negative for chest pain.   Gastrointestinal:  Negative for abdominal pain, diarrhea, nausea and vomiting.   Genitourinary:  Negative for dysuria.   Neurological:  Positive for tremors.     Objective:     Vital Signs (Most Recent):  Temp: 97.4 °F (36.3 °C) (08/25/23 1100)  Pulse: 85 (08/25/23 1100)  Resp: 18 (08/25/23 1100)  BP: 135/78 (08/25/23 1100)  SpO2: 100 % (08/25/23 1100) Vital Signs (24h Range):  Temp:  [94.4 °F (34.7 °C)-98.4 °F (36.9 °C)] 97.4 °F (36.3 °C)  Pulse:  [] 85  Resp:  [13-20] 18  SpO2:  [93 %-100 %] 100 %  BP: (116-167)/(58-82) 135/78     Weight: (!) 152 kg (335 lb 1.6 oz)  Body mass index is 55.76 kg/m².    Intake/Output Summary (Last 24 hours) at 8/25/2023 1421  Last data filed at 8/25/2023 0554  Gross per 24 hour   Intake 1652.33 ml   Output 675 ml   Net 977.33 ml         Physical Exam  Vitals reviewed.   Cardiovascular:      Rate and Rhythm: Normal rate.   Pulmonary:      Effort: Pulmonary effort is normal. No respiratory distress.   Musculoskeletal:      Right lower leg: Edema present.      Left lower leg: Edema present.   Skin:     General: Skin is warm and dry.   Neurological:      Mental Status: He is alert and oriented to person, place, and time.      Motor: Tremor present.             Significant Labs: All pertinent labs within the past 24 hours have been reviewed.    Significant Imaging: I have reviewed all pertinent imaging results/findings within the past 24 hours.

## 2023-08-25 NOTE — ANESTHESIA POSTPROCEDURE EVALUATION
Anesthesia Post Evaluation    Patient: John Spears    Procedure(s) Performed: Procedure(s) (LRB):  EXAM UNDER ANESTHESIA, DIGITAL, RECTUM (N/A)  RETURN TO OPERATING ROOM, FOR MINOR POSTOPERATIVE HEMORRHAGE CONTROL  HEMORRHOIDECTOMY (N/A)    Final Anesthesia Type: general      Patient location during evaluation: PACU  Patient participation: Yes- Able to Participate  Level of consciousness: awake and sedated  Post-procedure vital signs: reviewed and stable  Pain management: adequate  Airway patency: patent    PONV status at discharge: No PONV  Anesthetic complications: no      Cardiovascular status: blood pressure returned to baseline  Respiratory status: unassisted  Hydration status: euvolemic  Follow-up not needed.          Vitals Value Taken Time   /59 08/24/23 1921   Temp 98 08/24/23 1926   Pulse 99 08/24/23 1925   Resp 16 08/24/23 1920   SpO2 96 % 08/24/23 1925   Vitals shown include unvalidated device data.      No case tracking events are documented in the log.      Pain/Phoebe Score: Pain Rating Prior to Med Admin: 5 (8/23/2023  9:16 PM)  Pain Rating Post Med Admin: 3 (8/23/2023 10:16 PM)  Phoebe Score: 9 (8/24/2023  7:00 PM)

## 2023-08-25 NOTE — ASSESSMENT & PLAN NOTE
MELD score 22 points. 19.6% with estimated 3 month mortality  CIWA score 0    MRCP reveals cirrhosis and multiple gallstones    Pt will be referred to hepatology upon discharge

## 2023-08-25 NOTE — PROGRESS NOTES
1825 RECEIVED TO RR ASLEEP, ORAL AIRWAY IN PLACE. O2 VIA FM. HOB ELEVATED NO RESP. DISTRESS NOTED.  LEFT SUBCLAVIN C-LINE IN PLACE, CAPPED. IV INFUSING LEFT AC. DRESSING RECTAL AREA D/I, RED SHADOWING NOTED. UNDER DRESSING. PITTING EDEMA NOTED LOWER EXTREMITIES, 3+. TEMPERATURE LOW, WARMING GOWN IN PROGRESS. SEE FLOW SHEET.    1835 AWAKE, ORAL AIRWAY REMOVED, ORIENTATION GIVEN.EYES JAUNDICE. JAYLON.    1855 ONE UNIT OF FFP STARTED AS ORDERED, SEE BLOOD FLOW SHEET.    1905 TRYING TO GET UP FROM STRETCHER STATES WANTS WATER. C/O BEING UNCOMFORTABLE.     1915 DILAUDID TITRATED FOR PAIN.    1925 STILL C/O BEING UNCOMFORTABLE. TRYING TO GET UP. SEE MAR. TEMP. 97.4. WARMING BLANKET REMOVED.    1942 FFP COMPLETED. SLEEPING. O2 VIA NC. NO RESP. DISTRESS NOTED. NO ACTIVE BLEEDING NOTED FROM RECTUM. TEMP. 97.4.    1954 SLEEPING WITHOUT DISTRESS NOTED. TRANSFERRED TO ROOM.

## 2023-08-25 NOTE — PROGRESS NOTES
[facility] - [facility] Hospital Medicine  Progress Note    Patient Name: John Spears  MRN: [id number]  Patient Class: IP- Inpatient   Admission Date: 8/19/2023  Length of Stay: 5 days  Attending Physician: Jamal Thomas Jr., MD  Primary Care Provider: Ruben Provider        Subjective:     Principal Problem:Bleeding hemorrhoid        HPI:  Patient is a 47 y/o male with PMH of HTN, HLD, cirrhosis of the liver and hemorrhoids who presents to the ED with complaint of COB and swelling of the abdomen, legs and feet that started about 1 month ago. Patient states that the swelling and SOB has been worsening for the last 2 weeks. Patient reports passing dark blood per rectum with bowel movement for the last 2 weeks. Patient reports having intermittent RUQ when he eats fried foot. Patient reports chills but no fever. Patient denies nausea, vomiting, chest pain or urinary changes. Patient reports drinking about 10 beers of 16 ounces per day for the 3 years and for the last months he stopped drinking beer and started drinking  liquor. He states that 1 fifth of liquor lasts for 3 days. Patient reports withdrawal symptoms in the past. Patient reports that he quit smoking cigarettes 5 years ago.      In the ED showed /67, HR 90, RR 16, SpO2 98% and afebrile. Blood work showed H/H 6.1/19, WBC 5.6, PLT 78, PT 21.2, INR 1.87, PTT 48.2. Sodium 134, potassium 2.8, Alkaline phosphatase 166, Total bilirrubin 6.5, AST/ALST 61/41, p-, troponin 16.7. UA negative for infection or blood. FOBT negative. EKG nsr with HR 89. Patient is receiving PRBC in the ER at this moment and electrolytes are being replenished. Patient will be admitted to the hospital for further management.       Overview/Hospital Course:  No notes on file    Interval History: 8/25:  On 8/24 patient taken for rectal exam with anesthesia with hemorrhoidectomy and ligation. No blood loss noted today. Pt was more alert on  presentation but noted to have a tremor. Ammonia level was elevated and pt was started on rifaxmin.    Review of Systems   Constitutional:  Negative for fatigue.   Respiratory:  Negative for chest tightness and shortness of breath.    Cardiovascular:  Negative for chest pain.   Gastrointestinal:  Negative for abdominal pain, diarrhea, nausea and vomiting.   Genitourinary:  Negative for dysuria.   Neurological:  Positive for tremors.     Objective:     Vital Signs (Most Recent):  Temp: 97.4 °F (36.3 °C) (08/25/23 1100)  Pulse: 85 (08/25/23 1100)  Resp: 18 (08/25/23 1100)  BP: 135/78 (08/25/23 1100)  SpO2: 100 % (08/25/23 1100) Vital Signs (24h Range):  Temp:  [94.4 °F (34.7 °C)-98.4 °F (36.9 °C)] 97.4 °F (36.3 °C)  Pulse:  [] 85  Resp:  [13-20] 18  SpO2:  [93 %-100 %] 100 %  BP: (116-167)/(58-82) 135/78     Weight: (!) 152 kg (335 lb 1.6 oz)  Body mass index is 55.76 kg/m².    Intake/Output Summary (Last 24 hours) at 8/25/2023 1421  Last data filed at 8/25/2023 0554  Gross per 24 hour   Intake 1652.33 ml   Output 675 ml   Net 977.33 ml         Physical Exam  Vitals reviewed.   Cardiovascular:      Rate and Rhythm: Normal rate.   Pulmonary:      Effort: Pulmonary effort is normal. No respiratory distress.   Musculoskeletal:      Right lower leg: Edema present.      Left lower leg: Edema present.   Skin:     General: Skin is warm and dry.   Neurological:      Mental Status: He is alert and oriented to person, place, and time.      Motor: Tremor present.             Significant Labs: All pertinent labs within the past 24 hours have been reviewed.    Significant Imaging: I have reviewed all pertinent imaging results/findings within the past 24 hours.      Assessment/Plan:      * Bleeding hemorrhoid  Likely secondary to liver cirrhosis    Per Dr. Gusman:  Active arterial bleeding on endoscopy in anal canal; too friable for clip. (In OR) Arterial bleeding at the 9 o'clock position in the anal canal; multiple  bleeding hemorrhoidal plexus at 6 o'clock position on the posterior canal and at 3 o'clock position    Patient received 2 units of packed red blood cells in the OR and we will give FFP    8/25: No bleeding overnight    Acute lower GI bleeding    Patient received 2 units of packed red blood cells in the OR and we will give FFP  8/25: Hgb 7.8    Hypertension    Monitor BP    Alcoholic cirrhosis of liver  MELD score 22 points. 19.6% with estimated 3 month mortality  CIWA score 0    MRCP reveals cirrhosis and multiple gallstones    Pt will be referred to hepatology upon discharge          Anemia  Acute blood loss     8/24: Hgb dropped to 7.1 with active bleeding. Patient received 2 units of packed red blood cells in the OR and we will give FF      Hypoalbuminemia  Likely from malturition + or - synthetic function of the liver  C/w Spironolactone and lasix      VTE Risk Mitigation (From admission, onward)         Ordered     Reason for No Pharmacological VTE Prophylaxis  Once        Question:  Reasons:  Answer:  Active Bleeding    08/20/23 0043     IP VTE HIGH RISK PATIENT  Once         08/20/23 0043     Place sequential compression device  Until discontinued         08/20/23 0043                Discharge Planning   LUCRECIA:      Code Status: Full Code   Is the patient medically ready for discharge?:     Reason for patient still in hospital (select all that apply): Treatment  Discharge Plan A: Home with family                  Valencia Butcher MD  Department of Hospital Medicine   [facility] - [facility]

## 2023-08-25 NOTE — PROGRESS NOTES
Gastroenterology Consult Note    Chief Complaint: Bleeding hemorrhoid    Consulted by:   Dr. Beckford    HPI:  John Spears is a 49 yo male with hx of daily ETOH use for years (12 pack beer /day for over 5 years) is admitted with c/o daily bright red hematochezia for the past 2 weeks. He denies abdominal pain, N/V, hematemesis or melena. He had Hgb 6 on admission and has received 1 unit RBC's with repeat hgb 6 and just completed 2nd unit of blood. Hemodynamics have been stable. Liver tests noted to be abnormal with Tbili 5.5, albumin 1.8, AST 56, ALT 36. He has previously been told he had fatty liver but not aware of any other liver problems. He has never had endoscopy. He denies previous hx of blood transfusion or IVDA. Patient went to OR with Dr. Gusman for control of bleeding - suspected rectal varices vs IH and proceeded with banding and fulguration, but reports bands came off.     INTERVAL  - doing ok today, no further bleeding  - Flex sig yesterday with bleeding in the anal canal s/p OR for ligation and coag  - has had some pain and somnolent on rounds from pain medication  - wife said he was doing well this morning, coherent and ate breakfast, conversant    Review of Systems   Constitutional:  Negative for weight loss.   Gastrointestinal:  Positive for blood in stool. Negative for abdominal pain, constipation, diarrhea, heartburn, melena, nausea and vomiting.   All other systems reviewed and are negative.      Past Medical History:   Diagnosis Date    Fatty liver     History of alcohol abuse     Hypertension     Mixed hyperlipidemia     Thyroid disease      Past Surgical History:   Procedure Laterality Date    ABDOMINAL SURGERY      DIGITAL RECTAL EXAMINATION UNDER ANESTHESIA N/A 8/20/2023    Procedure: EXAM UNDER ANESTHESIA, DIGITAL, RECTUM;  Surgeon: Michael Gusman DO;  Location: [facility] OR;  Service: General;  Laterality: N/A;    DIGITAL RECTAL EXAMINATION UNDER ANESTHESIA N/A 8/24/2023     "Procedure: EXAM UNDER ANESTHESIA, DIGITAL, RECTUM;  Surgeon: Michael Gusman DO;  Location: [facility] OR;  Service: General;  Laterality: N/A;    EXCISIONAL HEMORRHOIDECTOMY N/A 8/20/2023    Procedure: HEMORRHOIDECTOMY;  Surgeon: Michael Gusman DO;  Location: [facility] OR;  Service: General;  Laterality: N/A;    EXCISIONAL HEMORRHOIDECTOMY N/A 8/24/2023    Procedure: HEMORRHOIDECTOMY;  Surgeon: Michael Gusman DO;  Location: [facility] OR;  Service: General;  Laterality: N/A;    RETURN TO OPERATING ROOM, FOR MINOR POSTOPERATIVE HEMORRHAGE CONTROL  8/24/2023    Procedure: RETURN TO OPERATING ROOM, FOR MINOR POSTOPERATIVE HEMORRHAGE CONTROL;  Surgeon: Michael Gusman DO;  Location: [facility] OR;  Service: General;;     History reviewed. No pertinent family history.    OBJECTIVE:  /78 (BP Location: Right arm, Patient Position: Lying)   Pulse 85   Temp 97.4 °F (36.3 °C) (Axillary)   Resp 18   Ht 5' 5" (1.651 m)   Wt (!) 152 kg (335 lb 1.6 oz)   SpO2 100%   BMI 55.76 kg/m²   GEN: chronically ill appearing, obese, cooperative, NAD  HEENT: NCAT, OP benign, MMM  NECK: Supple, no LAD  CV: normal rate, regular rhythm  RESP: CTA anteriorly, unlabored  ABD: NABS, ND, NT, soft, no guarding  EXT: No clubbing, cyanosis; +edema to the thighs   SKIN: Warm and dry  NEURO: AAO x2 - somnolent from pain medication    LABS:  Recent Results (from the past 336 hour(s))   CBC with Differential    Collection Time: 08/25/23  4:00 AM   Result Value Ref Range    WBC 7.40 4.50 - 11.00 K/uL    Hemoglobin 7.8 (L) 13.5 - 18.0 g/dL    Hematocrit 23.8 (L) 40.0 - 54.0 %    Platelet Count 66 (L) 150 - 400 K/uL   CBC with Differential    Collection Time: 08/24/23  4:34 AM   Result Value Ref Range    WBC 8.19 4.50 - 11.00 K/uL    Hemoglobin 7.1 (L) 13.5 - 18.0 g/dL    Hematocrit 22.1 (L) 40.0 - 54.0 %    Platelet Count 71 (L) 150 - 400 K/uL   CBC with Differential    Collection Time: 08/23/23  4:23 AM   Result Value Ref Range    WBC " 8.20 4.50 - 11.00 K/uL    Hemoglobin 7.6 (L) 13.5 - 18.0 g/dL    Hematocrit 22.6 (L) 40.0 - 54.0 %    Platelet Count 64 (L) 150 - 400 K/uL     CMP  Sodium   Date Value Ref Range Status   08/25/2023 138 136 - 145 mmol/L Final     Potassium   Date Value Ref Range Status   08/25/2023 3.7 3.5 - 5.1 mmol/L Final     Chloride   Date Value Ref Range Status   08/25/2023 104 98 - 107 mmol/L Final     CO2   Date Value Ref Range Status   08/25/2023 26 21 - 32 mmol/L Final     Glucose   Date Value Ref Range Status   08/25/2023 88 74 - 106 mg/dL Final     BUN   Date Value Ref Range Status   08/25/2023 21 (H) 7 - 18 mg/dL Final     Creatinine   Date Value Ref Range Status   08/25/2023 1.52 (H) 0.70 - 1.30 mg/dL Final     Calcium   Date Value Ref Range Status   08/25/2023 8.2 (L) 8.5 - 10.1 mg/dL Final     Total Protein   Date Value Ref Range Status   08/25/2023 6.6 6.4 - 8.2 g/dL Final     Albumin   Date Value Ref Range Status   08/25/2023 1.9 (L) 3.5 - 5.0 g/dL Final     Bilirubin, Total   Date Value Ref Range Status   08/25/2023 6.4 (H) >0.0 - 1.2 mg/dL Final     Alk Phos   Date Value Ref Range Status   08/25/2023 100 45 - 115 U/L Final     AST   Date Value Ref Range Status   08/25/2023 45 (H) 15 - 37 U/L Final     ALT   Date Value Ref Range Status   08/25/2023 25 16 - 61 U/L Final     Anion Gap   Date Value Ref Range Status   08/25/2023 12 7 - 16 mmol/L Final     eGFR   Date Value Ref Range Status   08/25/2023 55 (L) >=60 mL/min/1.73m2 Final     Lab Results   Component Value Date    INR 1.90 08/25/2023    INR 2.01 08/24/2023    INR 1.95 08/23/2023       IMAGING:    Abd US  Nodular contour of the liver suspicious for cirrhosis. Small volume biliary sludge/stones.  There is nonspecific gallbladder wall thickening which can be seen in the setting of liver disease. Consider nuclear medicine hepatobiliary imaging study for further evaluation.  Borderline prominence of the common duct.  Correlate with lab values including bilirubin.   If clinically indicated, MRCP or ERCP may be of benefit for further evaluation.    EGD  Moderate abnormal mucosa in the body of the stomach and antrum, consistent with gastritis; performed cold forceps biopsies to rule out H. pylori  The upper third of the esophagus, middle third of the esophagus, lower third of the esophagus, Z-line, cardia, fundus of the stomach, body of the stomach, incisura, antrum, duodenal bulb, 1st part of the duodenum and 2nd part of the duodenum appeared normal.  No varices, blood/bleeding, or peptic ulcer     Flex Sig  The descending colon and sigmoid colon, proximal rectum and mid rectum appeared normal allowing for limited views (blood/clot).  Active bleeding from an unroofed vessel in the posterior anal canal below the dentate line    ASSESSMENT:    47 yo obese AAM with longstanding EtOH dependence admitted with rectal bleeding and newly diagnosed cirrhosis.     PLAN:    Hematochezia  - bleeding from unroofed vessel in the anal canal and external hemorrhoids; no rectal varices  - s/p OR yesterday with Dr. Gusman with improvement in bleeding      Compensated Cirrhosis   - Etiology: likely alcohol, NAFLD  - suggestive by radiographic appearance, thrombocytopenia  - Proceed with chronic liver workup: ordered; Echo pending   - Vaccinations: HAV immune; recommend HBV immunization     - MELD: 27  - Ascites: none, but does have fair amount of LE edema - defer to primary team for management   - Varices: no varices; repeat EGD 2y unless he decompensates   - Encephalopathy: none  - HCC Surveilance: no mass on US 8/2023  - Transplant candidate: encouraged alcohol abstinence; I did discuss with patient and wife that EtOH was related to liver disease and continued alcohol intake would lead to worsening of liver disease and that continued alcohol use may prevent him from getting a transplant in the future. Prior to my discussion with him, he had never been counseled by a GI physician. Currently  uninsured but reports insurance will start on Sept 1; if liver functions worsens or he has a clinical decline, I recommend discussion with tertiary facility regarding transfer for liver transplant evaluation      Hyperbilirubinemia  - borderline CBD on US, but MRCP with no CBD dilatation  - MRCP limited by motion artifact  - labs also reflective of alcoholic hepatitis with elevated MDFS; however, labs have improved some, so have held off on Steroids; if this changes, would start Prednisone 40 mg daily       Thank you for the consult and including me in the care of this patient. Please call me with questions.     Real Ng MD  Gastroenterology

## 2023-08-25 NOTE — ASSESSMENT & PLAN NOTE
Likely secondary to liver cirrhosis    Per Dr. Gusman:  Active arterial bleeding on endoscopy in anal canal; too friable for clip. (In OR) Arterial bleeding at the 9 o'clock position in the anal canal; multiple bleeding hemorrhoidal plexus at 6 o'clock position on the posterior canal and at 3 o'clock position    Patient received 2 units of packed red blood cells in the OR and we will give FFP    8/25: No bleeding overnight

## 2023-08-25 NOTE — ASSESSMENT & PLAN NOTE
To the OR for rectal exam under anesthesia with possible hemorrhoidectomy.      Risks and benefits explained with the patient including risks for infection, bleeding, injury to surrounding structures, hematoma/seroma formation with need for possible evacuation, possible open.  The patient verbalized understanding, agrees and wishes to proceed with surgery.    Hemoglobin 6.1; currently receiving 2nd unit of packed red blood cells.    8/25: s/p takeback for continued rectal bleeding; arterial bleed ligated; external hemorrhoids excised; dressing in place. With cirrhosis and portal hypertension, if patient rebleeds, we can pack the area and patient will need to be transferred to higher level of care (Discussed with family; 1st choice is [facility]) for hepatobiliary and colorectal management. Discussed with Hospitalist team. Hgb 7.8 (was 7.9 yesterday s/p transfusion). Platelets 66; monitor

## 2023-08-25 NOTE — ASSESSMENT & PLAN NOTE
Likely from malturition + or - synthetic function of the liver  C/w Spironolactone and lasix   Attending

## 2023-08-25 NOTE — SUBJECTIVE & OBJECTIVE
Interval History:   Taken for flex sig yesterday and there was arterial bleeding in the anal canal; patient taken for rectal exam with anesthesia with hemorrhoidectomy and ligation.  No significant bleeding overnight.  Mild pain; controlled.  Patient does seem shaky with slight tremor this a.m.    Medications:  Continuous Infusions:   lactated ringers         Scheduled Meds:   folic acid  1 mg Oral Daily    furosemide (LASIX) injection  20 mg Intravenous Q12H    pantoprazole  40 mg Intravenous BID    polyethylene glycol  17 g Oral BID    potassium bicarbonate  40 mEq Oral Once    spironolactone  25 mg Oral Daily    thiamine (B-1) 500 mg in dextrose 5 % (D5W) 100 mL IVPB  500 mg Intravenous TID     PRN Meds:0.9%  NaCl infusion (for blood administration), 0.9%  NaCl infusion (for blood administration), 0.9%  NaCl infusion (for blood administration), 0.9%  NaCl infusion (for blood administration), acetaminophen, HYDROmorphone, LIDOcaine HCl 2%, lorazepam, melatonin, naloxone, ondansetron, ondansetron, oxyCODONE-acetaminophen, prochlorperazine, simethicone, sodium chloride 0.9%     Review of patient's allergies indicates:  No Known Allergies  Objective:     Vital Signs (Most Recent):  Temp: 98.4 °F (36.9 °C) (08/25/23 0709)  Pulse: 78 (08/25/23 0709)  Resp: 19 (08/25/23 0709)  BP: (!) 154/82 (08/25/23 0709)  SpO2: 98 % (08/25/23 0709) Vital Signs (24h Range):  Temp:  [94.4 °F (34.7 °C)-98.4 °F (36.9 °C)] 98.4 °F (36.9 °C)  Pulse:  [] 78  Resp:  [13-19] 19  SpO2:  [93 %-100 %] 98 %  BP: (116-167)/(58-82) 154/82     Weight: (!) 152 kg (335 lb 1.6 oz)  Body mass index is 55.76 kg/m².    Intake/Output - Last 3 Shifts         08/23 0700 08/24 0659 08/24 0700 08/25 0659 08/25 0700 08/26 0659    I.V. (mL/kg)  150 (1)     Blood  544     IV Piggyback  958.3     Total Intake(mL/kg)  1652.3 (10.9)     Urine (mL/kg/hr)  600 (0.2)     Blood  75     Total Output  675     Net  +977.3                     Physical Exam  Vitals  reviewed.   Cardiovascular:      Rate and Rhythm: Normal rate.   Pulmonary:      Effort: Pulmonary effort is normal. No respiratory distress.   Skin:     General: Skin is warm and dry.   Neurological:      Mental Status: He is alert. Mental status is at baseline.          Significant Labs:  I have reviewed all pertinent lab results within the past 24 hours.  CBC:   Recent Labs   Lab 08/25/23  0400   WBC 7.40   RBC 2.72*   HGB 7.8*   HCT 23.8*   PLT 66*   MCV 87.5   MCH 28.7   MCHC 32.8       CMP:   Recent Labs   Lab 08/25/23  0400   GLU 88   CALCIUM 8.2*   ALBUMIN 1.9*   PROT 6.6      K 3.7   CO2 26      BUN 21*   CREATININE 1.52*   ALKPHOS 100   ALT 25   AST 45*   BILITOT 6.4*         Significant Diagnostics:  I have reviewed all pertinent imaging results/findings within the past 24 hours.

## 2023-08-25 NOTE — PROGRESS NOTES
2000 RELEASED TO FLOOR RN ASLEEP WITHOUT RESP. DISTRESS V/S 124/68-88-16-99%-97.4 TEMP ORAL. FAMILY AT BEDSIDE.
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
## 2023-08-25 NOTE — ASSESSMENT & PLAN NOTE
Patient received 2 units of packed red blood cells in the OR and we will give FFP  8/25: Hgb 7.8

## 2023-08-26 LAB
ABO + RH BLD: NORMAL
ALBUMIN SERPL BCP-MCNC: 1.8 G/DL (ref 3.5–5)
ALBUMIN/GLOB SERPL: 0.4 {RATIO}
ALP SERPL-CCNC: 104 U/L (ref 45–115)
ALT SERPL W P-5'-P-CCNC: 24 U/L (ref 16–61)
ANION GAP SERPL CALCULATED.3IONS-SCNC: 10 MMOL/L (ref 7–16)
AST SERPL W P-5'-P-CCNC: 47 U/L (ref 15–37)
BASOPHILS # BLD AUTO: 0.06 K/UL (ref 0–0.2)
BASOPHILS NFR BLD AUTO: 0.6 % (ref 0–1)
BILIRUB SERPL-MCNC: 5.8 MG/DL (ref ?–1.2)
BLD PROD TYP BPU: NORMAL
BLOOD UNIT EXPIRATION DATE: NORMAL
BLOOD UNIT TYPE CODE: 5100
BUN SERPL-MCNC: 22 MG/DL (ref 7–18)
BUN/CREAT SERPL: 12 (ref 6–20)
CALCIUM SERPL-MCNC: 8 MG/DL (ref 8.5–10.1)
CHLORIDE SERPL-SCNC: 104 MMOL/L (ref 98–107)
CO2 SERPL-SCNC: 27 MMOL/L (ref 21–32)
CREAT SERPL-MCNC: 1.79 MG/DL (ref 0.7–1.3)
CROSSMATCH INTERPRETATION: NORMAL
DIFFERENTIAL METHOD BLD: ABNORMAL
DISPENSE STATUS: NORMAL
EGFR (NO RACE VARIABLE) (RUSH/TITUS): 46 ML/MIN/1.73M2
EOSINOPHIL # BLD AUTO: 0.47 K/UL (ref 0–0.5)
EOSINOPHIL NFR BLD AUTO: 4.5 % (ref 1–4)
ERYTHROCYTE [DISTWIDTH] IN BLOOD BY AUTOMATED COUNT: 21.6 % (ref 11.5–14.5)
GLOBULIN SER-MCNC: 4.6 G/DL (ref 2–4)
GLUCOSE SERPL-MCNC: 91 MG/DL (ref 74–106)
HCT VFR BLD AUTO: 21.3 % (ref 40–54)
HGB BLD-MCNC: 7.2 G/DL (ref 13.5–18)
IMM GRANULOCYTES # BLD AUTO: 0.16 K/UL (ref 0–0.04)
IMM GRANULOCYTES NFR BLD: 1.5 % (ref 0–0.4)
INDIRECT COOMBS: NORMAL
INR BLD: 2.01
LYMPHOCYTES # BLD AUTO: 1.66 K/UL (ref 1–4.8)
LYMPHOCYTES NFR BLD AUTO: 15.8 % (ref 27–41)
MCH RBC QN AUTO: 29.1 PG (ref 27–31)
MCHC RBC AUTO-ENTMCNC: 33.8 G/DL (ref 32–36)
MCV RBC AUTO: 86.2 FL (ref 80–96)
MONOCYTES # BLD AUTO: 2.43 K/UL (ref 0–0.8)
MONOCYTES NFR BLD AUTO: 23.1 % (ref 2–6)
MPC BLD CALC-MCNC: 11.2 FL (ref 9.4–12.4)
NEUTROPHILS # BLD AUTO: 5.72 K/UL (ref 1.8–7.7)
NEUTROPHILS NFR BLD AUTO: 54.5 % (ref 53–65)
NRBC # BLD AUTO: 0 X10E3/UL
NRBC, AUTO (.00): 0 %
PLATELET # BLD AUTO: 69 K/UL (ref 150–400)
POTASSIUM SERPL-SCNC: 3.6 MMOL/L (ref 3.5–5.1)
PROT SERPL-MCNC: 6.4 G/DL (ref 6.4–8.2)
PROTHROMBIN TIME: 22.4 SECONDS (ref 11.7–14.7)
RBC # BLD AUTO: 2.47 M/UL (ref 4.6–6.2)
RH BLD: NORMAL
SODIUM SERPL-SCNC: 137 MMOL/L (ref 136–145)
SPECIMEN OUTDATE: NORMAL
UNIT NUMBER: NORMAL
WBC # BLD AUTO: 10.5 K/UL (ref 4.5–11)

## 2023-08-26 PROCEDURE — 11000001 HC ACUTE MED/SURG PRIVATE ROOM

## 2023-08-26 PROCEDURE — 25000003 PHARM REV CODE 250

## 2023-08-26 PROCEDURE — 63600175 PHARM REV CODE 636 W HCPCS: Performed by: STUDENT IN AN ORGANIZED HEALTH CARE EDUCATION/TRAINING PROGRAM

## 2023-08-26 PROCEDURE — 25000003 PHARM REV CODE 250: Performed by: NURSE PRACTITIONER

## 2023-08-26 PROCEDURE — 80053 COMPREHEN METABOLIC PANEL: CPT | Performed by: STUDENT IN AN ORGANIZED HEALTH CARE EDUCATION/TRAINING PROGRAM

## 2023-08-26 PROCEDURE — 25000003 PHARM REV CODE 250: Performed by: STUDENT IN AN ORGANIZED HEALTH CARE EDUCATION/TRAINING PROGRAM

## 2023-08-26 PROCEDURE — 86923 COMPATIBILITY TEST ELECTRIC: CPT | Mod: 91

## 2023-08-26 PROCEDURE — P9016 RBC LEUKOCYTES REDUCED: HCPCS | Performed by: NURSE PRACTITIONER

## 2023-08-26 PROCEDURE — 99232 SBSQ HOSP IP/OBS MODERATE 35: CPT | Mod: GC,,, | Performed by: FAMILY MEDICINE

## 2023-08-26 PROCEDURE — 63600175 PHARM REV CODE 636 W HCPCS

## 2023-08-26 PROCEDURE — 63600175 PHARM REV CODE 636 W HCPCS: Performed by: INTERNAL MEDICINE

## 2023-08-26 PROCEDURE — 99232 PR SUBSEQUENT HOSPITAL CARE,LEVL II: ICD-10-PCS | Mod: GC,,, | Performed by: FAMILY MEDICINE

## 2023-08-26 PROCEDURE — 85610 PROTHROMBIN TIME: CPT | Performed by: STUDENT IN AN ORGANIZED HEALTH CARE EDUCATION/TRAINING PROGRAM

## 2023-08-26 PROCEDURE — 86900 BLOOD TYPING SEROLOGIC ABO: CPT | Performed by: NURSE PRACTITIONER

## 2023-08-26 PROCEDURE — 86923 COMPATIBILITY TEST ELECTRIC: CPT | Performed by: NURSE PRACTITIONER

## 2023-08-26 PROCEDURE — C9113 INJ PANTOPRAZOLE SODIUM, VIA: HCPCS | Performed by: INTERNAL MEDICINE

## 2023-08-26 PROCEDURE — 85025 COMPLETE CBC W/AUTO DIFF WBC: CPT | Performed by: STUDENT IN AN ORGANIZED HEALTH CARE EDUCATION/TRAINING PROGRAM

## 2023-08-26 RX ORDER — LACTULOSE 10 G/15ML
10 SOLUTION ORAL 2 TIMES DAILY
Status: DISCONTINUED | OUTPATIENT
Start: 2023-08-26 | End: 2023-09-09 | Stop reason: HOSPADM

## 2023-08-26 RX ORDER — HYDROCODONE BITARTRATE AND ACETAMINOPHEN 500; 5 MG/1; MG/1
TABLET ORAL
Status: DISCONTINUED | OUTPATIENT
Start: 2023-08-26 | End: 2023-08-27

## 2023-08-26 RX ADMIN — FOLIC ACID 1 MG: 1 TABLET ORAL at 09:08

## 2023-08-26 RX ADMIN — THIAMINE HYDROCHLORIDE 500 MG: 100 INJECTION, SOLUTION INTRAMUSCULAR; INTRAVENOUS at 08:08

## 2023-08-26 RX ADMIN — LACTULOSE 10 G: 20 SOLUTION ORAL at 01:08

## 2023-08-26 RX ADMIN — OXYCODONE AND ACETAMINOPHEN 1 TABLET: 10; 325 TABLET ORAL at 06:08

## 2023-08-26 RX ADMIN — POLYETHYLENE GLYCOL 3350 17 G: 17 POWDER, FOR SOLUTION ORAL at 08:08

## 2023-08-26 RX ADMIN — FUROSEMIDE 20 MG: 10 INJECTION, SOLUTION INTRAVENOUS at 09:08

## 2023-08-26 RX ADMIN — SODIUM CHLORIDE: 9 INJECTION, SOLUTION INTRAVENOUS at 11:08

## 2023-08-26 RX ADMIN — SPIRONOLACTONE 25 MG: 25 TABLET ORAL at 09:08

## 2023-08-26 RX ADMIN — RIFAXIMIN 550 MG: 550 TABLET ORAL at 09:08

## 2023-08-26 RX ADMIN — FUROSEMIDE 20 MG: 10 INJECTION, SOLUTION INTRAVENOUS at 08:08

## 2023-08-26 RX ADMIN — LACTULOSE 10 G: 20 SOLUTION ORAL at 08:08

## 2023-08-26 RX ADMIN — OXYCODONE AND ACETAMINOPHEN 1 TABLET: 10; 325 TABLET ORAL at 01:08

## 2023-08-26 RX ADMIN — PANTOPRAZOLE SODIUM 40 MG: 40 INJECTION, POWDER, FOR SOLUTION INTRAVENOUS at 08:08

## 2023-08-26 RX ADMIN — RIFAXIMIN 550 MG: 550 TABLET ORAL at 08:08

## 2023-08-26 RX ADMIN — THIAMINE HYDROCHLORIDE 500 MG: 100 INJECTION, SOLUTION INTRAMUSCULAR; INTRAVENOUS at 03:08

## 2023-08-26 NOTE — PROGRESS NOTES
[facility] Medicine  Progress Note    Patient Name: John Spears  MRN: [id number]  Patient Class: IP- Inpatient   Admission Date: 8/19/2023  Length of Stay: 6 days  Attending Physician: Jamal Thomas Jr., MD  Primary Care Provider: Ruben Provider        Subjective:     Principal Problem:Bleeding hemorrhoid        HPI:  Patient is a 49 y/o male with PMH of HTN, HLD, cirrhosis of the liver and hemorrhoids who presents to the ED with complaint of COB and swelling of the abdomen, legs and feet that started about 1 month ago. Patient states that the swelling and SOB has been worsening for the last 2 weeks. Patient reports passing dark blood per rectum with bowel movement for the last 2 weeks. Patient reports having intermittent RUQ when he eats fried foot. Patient reports chills but no fever. Patient denies nausea, vomiting, chest pain or urinary changes. Patient reports drinking about 10 beers of 16 ounces per day for the 3 years and for the last months he stopped drinking beer and started drinking  liquor. He states that 1 fifth of liquor lasts for 3 days. Patient reports withdrawal symptoms in the past. Patient reports that he quit smoking cigarettes 5 years ago.      In the ED showed /67, HR 90, RR 16, SpO2 98% and afebrile. Blood work showed H/H 6.1/19, WBC 5.6, PLT 78, PT 21.2, INR 1.87, PTT 48.2. Sodium 134, potassium 2.8, Alkaline phosphatase 166, Total bilirrubin 6.5, AST/ALST 61/41, p-, troponin 16.7. UA negative for infection or blood. FOBT negative. EKG nsr with HR 89. Patient is receiving PRBC in the ER at this moment and electrolytes are being replenished. Patient will be admitted to the hospital for further management.       Overview/Hospital Course:  No notes on file    Interval History: Pt seen lying in bed this morning with family at bedside. No overnight events. Family reports she was able to walk pt around room last PM. This  morning pt appears slightly lethargic but is easily arroused and is AAOx3. Will transfuse 1 unit PRBC today 2/2 7.2 hgb. Will give low dose lactulose 2/2 hyperamonemia.     Review of Systems   Constitutional:  Positive for activity change and fatigue.   Respiratory:  Negative for chest tightness and shortness of breath.    Cardiovascular:  Positive for leg swelling. Negative for chest pain.   Gastrointestinal:  Positive for abdominal distention and anal bleeding.   Skin:  Negative for rash and wound.   Neurological:  Positive for tremors.     Objective:     Vital Signs (Most Recent):  Temp: 98.1 °F (36.7 °C) (08/26/23 1035)  Pulse: 91 (08/26/23 1315)  Resp: 17 (08/26/23 1315)  BP: (!) 148/47 (08/26/23 1315)  SpO2: 98 % (08/26/23 1315) Vital Signs (24h Range):  Temp:  [97.8 °F (36.6 °C)-99.4 °F (37.4 °C)] 98.1 °F (36.7 °C)  Pulse:  [] 91  Resp:  [16-19] 17  SpO2:  [95 %-100 %] 98 %  BP: (103-178)/(47-88) 148/47     Weight: (!) 152.3 kg (335 lb 12.2 oz)  Body mass index is 55.87 kg/m².  No intake or output data in the 24 hours ending 08/26/23 1523      Physical Exam  Vitals and nursing note reviewed.   Constitutional:       Appearance: He is obese. He is ill-appearing.      Comments: Pt is somnolent but aroused easily   Eyes:      General: Scleral icterus present.   Cardiovascular:      Rate and Rhythm: Normal rate and regular rhythm.      Heart sounds: Normal heart sounds.   Pulmonary:      Breath sounds: Normal breath sounds.   Abdominal:      General: There is distension.   Musculoskeletal:      Right lower leg: Edema present.      Left lower leg: Edema present.   Skin:     General: Skin is warm and dry.      Capillary Refill: Capillary refill takes less than 2 seconds.   Neurological:      Mental Status: He is oriented to person, place, and time.             Significant Labs: All pertinent labs within the past 24 hours have been reviewed.  Recent Lab Results         08/26/23  0414        Unit Blood Type Code  5100  [P]       Unit Expiration [id number]  [P]       Albumin/Globulin Ratio 0.4       Albumin 1.8       Alkaline Phosphatase 104       ALT 24       Anion Gap 10       AST 47       Baso # 0.06       Basophil % 0.6       BILIRUBIN TOTAL 5.8       BUN 22       BUN/CREAT RATIO 12       Calcium 8.0       Chloride 104       CO2 27       Creatinine 1.79       Crossmatch Interpretation Compatible  [P]       Differential Type Auto       DISPENSE STATUS Issued  [P]       eGFR 46       Eos # 0.47       Eosinophil % 4.5       Globulin, Total 4.6       Glucose 91       Group & Rh O POS       Hematocrit 21.3       Hemoglobin 7.2       Immature Grans (Abs) 0.16       Immature Granulocytes 1.5       INDIRECT YAO NEG       INR 2.01       Lymph # 1.66       Lymph % 15.8       MCH 29.1       MCHC 33.8       MCV 86.2       Mono # 2.43       Mono % 23.1       MPV 11.2       Neutrophils, Abs 5.72       Neutrophils Relative 54.5       nRBC 0.0       NUCLEATED RBC ABSOLUTE 0.00       Platelets 69       Potassium 3.6       Product Code A6360J68  [P]       PROTEIN TOTAL 6.4       Protime 22.4       RBC 2.47       RDW 21.6       Sodium 137       Specimen Outdate 08/29/2023 23:59       Unit ABO/Rh O POS  [P]       UNIT NUMBER [id number]  [P]       WBC 10.50                [P] - Preliminary Result               Significant Imaging: I have reviewed all pertinent imaging results/findings within the past 24 hours.      Assessment/Plan:      * Bleeding hemorrhoid  Likely secondary to liver cirrhosis    Per Dr. Gusman:  Active arterial bleeding on endoscopy in anal canal; too friable for clip. (In OR) Arterial bleeding at the 9 o'clock position in the anal canal; multiple bleeding hemorrhoidal plexus at 6 o'clock position on the posterior canal and at 3 o'clock position    Patient received 2 units of packed red blood cells in the OR and we will give FFP    8/25: No bleeding overnight  8/26/23 no obvious bleeding on rectal  dressing    Hyperammonemia  Rifaxamin 550 mg BID Started 8/25/23 8/26/23 lactulose 10 g po BID; recheck ammonia in am      Acute lower GI bleeding    Patient received 2 units of packed red blood cells in the OR and we will give FFP  8/25: Hgb 7.8  8/26/23 no obvious rectal bleeding    Hypertension    Monitor BP    Alcoholic cirrhosis of liver  MELD score 22 points. 19.6% with estimated 3 month mortality  CIWA score 0    MRCP reveals cirrhosis and multiple gallstones    Pt will be referred to hepatology upon discharge          Anemia  Acute blood loss     8/24: Hgb dropped to 7.1 with active bleeding. Patient received 2 units of packed red blood cells in the OR and we will give FF    8/26/23 transfuse 1 unit PRBC. No obvious rectal bleeding      Hypoalbuminemia  Likely from malturition + or - synthetic function of the liver  C/w Spironolactone and lasix      VTE Risk Mitigation (From admission, onward)         Ordered     Reason for No Pharmacological VTE Prophylaxis  Once        Question:  Reasons:  Answer:  Active Bleeding    08/20/23 0043     IP VTE HIGH RISK PATIENT  Once         08/20/23 0043     Place sequential compression device  Until discontinued         08/20/23 0043                Discharge Planning   LUCRECIA:      Code Status: Full Code   Is the patient medically ready for discharge?:     Reason for patient still in hospital (select all that apply): Patient trending condition and Treatment  Discharge Plan A: Home with family                  Nat Jacinto MD  Department of Hospital Medicine   [facility]

## 2023-08-26 NOTE — ASSESSMENT & PLAN NOTE
Acute blood loss     8/24: Hgb dropped to 7.1 with active bleeding. Patient received 2 units of packed red blood cells in the OR and we will give FF    8/26/23 transfuse 1 unit PRBC. No obvious rectal bleeding

## 2023-08-26 NOTE — ASSESSMENT & PLAN NOTE
Patient received 2 units of packed red blood cells in the OR and we will give FFP  8/25: Hgb 7.8  8/26/23 no obvious rectal bleeding

## 2023-08-26 NOTE — PROGRESS NOTES
[facility] - [address] [place] [address]  General Surgery  Progress Note    Subjective:     Interval History:  No bleeding issues overnight.  Tolerating a diet.  H&H stable.    Post-Op Info:  Procedure(s) (LRB):  EXAM UNDER ANESTHESIA, DIGITAL, RECTUM (N/A)  RETURN TO OPERATING ROOM, FOR MINOR POSTOPERATIVE HEMORRHAGE CONTROL  HEMORRHOIDECTOMY (N/A)   2 Days Post-Op      Medications:  Continuous Infusions:   lactated ringers       Scheduled Meds:   folic acid  1 mg Oral Daily    furosemide (LASIX) injection  20 mg Intravenous Q12H    pantoprazole  40 mg Intravenous Daily    polyethylene glycol  17 g Oral BID    potassium bicarbonate  40 mEq Oral Once    rifAXImin  550 mg Oral BID    spironolactone  25 mg Oral Daily    thiamine (B-1) 500 mg in dextrose 5 % (D5W) 100 mL IVPB  500 mg Intravenous TID     PRN Meds:0.9%  NaCl infusion (for blood administration), 0.9%  NaCl infusion (for blood administration), 0.9%  NaCl infusion (for blood administration), 0.9%  NaCl infusion (for blood administration), 0.9%  NaCl infusion (for blood administration), acetaminophen, HYDROmorphone, LIDOcaine HCl 2%, lorazepam, melatonin, naloxone, ondansetron, ondansetron, oxyCODONE-acetaminophen, prochlorperazine, simethicone, sodium chloride 0.9%     Objective:     Vital Signs (Most Recent):  Temp: 98.1 °F (36.7 °C) (08/26/23 1035)  Pulse: 93 (08/26/23 1035)  Resp: 18 (08/26/23 1035)  BP: (!) 140/66 (08/26/23 1035)  SpO2: 98 % (08/26/23 1035) Vital Signs (24h Range):  Temp:  [97.4 °F (36.3 °C)-99.4 °F (37.4 °C)] 98.1 °F (36.7 °C)  Pulse:  [] 93  Resp:  [16-19] 18  SpO2:  [95 %-100 %] 98 %  BP: (103-178)/(64-88) 140/66     No intake or output data in the 24 hours ending 08/26/23 1059    Physical Exam  Constitutional:       General: He is not in acute distress.  HENT:      Head: Normocephalic.   Cardiovascular:      Rate and Rhythm: Normal rate and regular rhythm.      Pulses: Normal pulses.   Pulmonary:      Effort: Pulmonary  effort is normal. No respiratory distress.      Breath sounds: Normal breath sounds.   Abdominal:      General: Abdomen is flat. There is no distension.      Palpations: Abdomen is soft.      Tenderness: There is no abdominal tenderness.   Musculoskeletal:         General: Normal range of motion.   Skin:     General: Skin is warm.   Neurological:      General: No focal deficit present.      Mental Status: He is oriented to person, place, and time.         Significant Labs:  CBC:   Recent Labs   Lab 08/26/23 0414   WBC 10.50   RBC 2.47*   HGB 7.2*   HCT 21.3*   PLT 69*   MCV 86.2   MCH 29.1   MCHC 33.8     CMP:   Recent Labs   Lab 08/26/23 0414   GLU 91   CALCIUM 8.0*   ALBUMIN 1.8*   PROT 6.4      K 3.6   CO2 27      BUN 22*   CREATININE 1.79*   ALKPHOS 104   ALT 24   AST 47*   BILITOT 5.8*       Significant Diagnostics:  None    Assessment/Plan:     Active Diagnoses:    Diagnosis Date Noted POA    PRINCIPAL PROBLEM:  Bleeding hemorrhoid [K64.9] 08/20/2023 Yes    Hyperammonemia [E72.20] 08/25/2023 Unknown    Alcoholic cirrhosis of liver [K70.30] 08/20/2023 Yes    Hypertension [I10] 08/20/2023 Yes    Acute lower GI bleeding [K92.2] 08/20/2023 Unknown    Hypoalbuminemia [E88.09] 08/19/2023 Yes    Anemia [D64.9] 08/19/2023 Unknown      Problems Resolved During this Admission:    Diagnosis Date Noted Date Resolved POA    Ascites [R18.8] 08/19/2023 08/20/2023 Yes     Continue monitoring.  Will look for any bleeding.    Ye Mahajan MD  General Surgery  [facility] - [address] [place] [address]

## 2023-08-26 NOTE — ASSESSMENT & PLAN NOTE
Likely secondary to liver cirrhosis    Per Dr. Gusman:  Active arterial bleeding on endoscopy in anal canal; too friable for clip. (In OR) Arterial bleeding at the 9 o'clock position in the anal canal; multiple bleeding hemorrhoidal plexus at 6 o'clock position on the posterior canal and at 3 o'clock position    Patient received 2 units of packed red blood cells in the OR and we will give FFP    8/25: No bleeding overnight  8/26/23 no obvious bleeding on rectal dressing

## 2023-08-26 NOTE — SUBJECTIVE & OBJECTIVE
Interval History: Pt seen lying in bed this morning with family at bedside. No overnight events. Family reports she was able to walk pt around room last PM. This morning pt appears slightly lethargic but is easily arroused and is AAOx3. Will transfuse 1 unit PRBC today 2/2 7.2 hgb. Will give low dose lactulose 2/2 hyperamonemia.     Review of Systems   Constitutional:  Positive for activity change and fatigue.   Respiratory:  Negative for chest tightness and shortness of breath.    Cardiovascular:  Positive for leg swelling. Negative for chest pain.   Gastrointestinal:  Positive for abdominal distention and anal bleeding.   Skin:  Negative for rash and wound.   Neurological:  Positive for tremors.     Objective:     Vital Signs (Most Recent):  Temp: 98.1 °F (36.7 °C) (08/26/23 1035)  Pulse: 91 (08/26/23 1315)  Resp: 17 (08/26/23 1315)  BP: (!) 148/47 (08/26/23 1315)  SpO2: 98 % (08/26/23 1315) Vital Signs (24h Range):  Temp:  [97.8 °F (36.6 °C)-99.4 °F (37.4 °C)] 98.1 °F (36.7 °C)  Pulse:  [] 91  Resp:  [16-19] 17  SpO2:  [95 %-100 %] 98 %  BP: (103-178)/(47-88) 148/47     Weight: (!) 152.3 kg (335 lb 12.2 oz)  Body mass index is 55.87 kg/m².  No intake or output data in the 24 hours ending 08/26/23 1523      Physical Exam  Vitals and nursing note reviewed.   Constitutional:       Appearance: He is obese. He is ill-appearing.      Comments: Pt is somnolent but aroused easily   Eyes:      General: Scleral icterus present.   Cardiovascular:      Rate and Rhythm: Normal rate and regular rhythm.      Heart sounds: Normal heart sounds.   Pulmonary:      Breath sounds: Normal breath sounds.   Abdominal:      General: There is distension.   Musculoskeletal:      Right lower leg: Edema present.      Left lower leg: Edema present.   Skin:     General: Skin is warm and dry.      Capillary Refill: Capillary refill takes less than 2 seconds.   Neurological:      Mental Status: He is oriented to person, place, and time.              Significant Labs: All pertinent labs within the past 24 hours have been reviewed.  Recent Lab Results         08/26/23 0414        Unit Blood Type Code 5100  [P]       Unit Expiration [id number]  [P]       Albumin/Globulin Ratio 0.4       Albumin 1.8       Alkaline Phosphatase 104       ALT 24       Anion Gap 10       AST 47       Baso # 0.06       Basophil % 0.6       BILIRUBIN TOTAL 5.8       BUN 22       BUN/CREAT RATIO 12       Calcium 8.0       Chloride 104       CO2 27       Creatinine 1.79       Crossmatch Interpretation Compatible  [P]       Differential Type Auto       DISPENSE STATUS Issued  [P]       eGFR 46       Eos # 0.47       Eosinophil % 4.5       Globulin, Total 4.6       Glucose 91       Group & Rh O POS       Hematocrit 21.3       Hemoglobin 7.2       Immature Grans (Abs) 0.16       Immature Granulocytes 1.5       INDIRECT YAO NEG       INR 2.01       Lymph # 1.66       Lymph % 15.8       MCH 29.1       MCHC 33.8       MCV 86.2       Mono # 2.43       Mono % 23.1       MPV 11.2       Neutrophils, Abs 5.72       Neutrophils Relative 54.5       nRBC 0.0       NUCLEATED RBC ABSOLUTE 0.00       Platelets 69       Potassium 3.6       Product Code D7429E70  [P]       PROTEIN TOTAL 6.4       Protime 22.4       RBC 2.47       RDW 21.6       Sodium 137       Specimen Outdate 08/29/2023 23:59       Unit ABO/Rh O POS  [P]       UNIT NUMBER [id number]  [P]       WBC 10.50                [P] - Preliminary Result               Significant Imaging: I have reviewed all pertinent imaging results/findings within the past 24 hours.

## 2023-08-27 LAB
ABO + RH BLD: NORMAL
ALBUMIN SERPL BCP-MCNC: 1.7 G/DL (ref 3.5–5)
ALBUMIN/GLOB SERPL: 0.4 {RATIO}
ALP SERPL-CCNC: 117 U/L (ref 45–115)
ALT SERPL W P-5'-P-CCNC: 24 U/L (ref 16–61)
AMMONIA PLAS-SCNC: 52 ΜMOL/L (ref 11–32)
ANION GAP SERPL CALCULATED.3IONS-SCNC: 8 MMOL/L (ref 7–16)
AST SERPL W P-5'-P-CCNC: 49 U/L (ref 15–37)
BASOPHILS # BLD AUTO: 0.06 K/UL (ref 0–0.2)
BASOPHILS NFR BLD AUTO: 0.6 % (ref 0–1)
BILIRUB SERPL-MCNC: 5.5 MG/DL (ref ?–1.2)
BLD PROD TYP BPU: NORMAL
BLOOD UNIT EXPIRATION DATE: NORMAL
BLOOD UNIT TYPE CODE: 5100
BUN SERPL-MCNC: 19 MG/DL (ref 7–18)
BUN/CREAT SERPL: 14 (ref 6–20)
CALCIUM SERPL-MCNC: 7.8 MG/DL (ref 8.5–10.1)
CHLORIDE SERPL-SCNC: 101 MMOL/L (ref 98–107)
CO2 SERPL-SCNC: 29 MMOL/L (ref 21–32)
CREAT SERPL-MCNC: 1.39 MG/DL (ref 0.7–1.3)
CROSSMATCH INTERPRETATION: NORMAL
DIFFERENTIAL METHOD BLD: ABNORMAL
DISPENSE STATUS: NORMAL
EGFR (NO RACE VARIABLE) (RUSH/TITUS): 62 ML/MIN/1.73M2
EOSINOPHIL # BLD AUTO: 0.47 K/UL (ref 0–0.5)
EOSINOPHIL NFR BLD AUTO: 4.9 % (ref 1–4)
ERYTHROCYTE [DISTWIDTH] IN BLOOD BY AUTOMATED COUNT: 20.4 % (ref 11.5–14.5)
GLOBULIN SER-MCNC: 4.3 G/DL (ref 2–4)
GLUCOSE SERPL-MCNC: 114 MG/DL (ref 74–106)
HCT VFR BLD AUTO: 21 % (ref 40–54)
HGB BLD-MCNC: 7.2 G/DL (ref 13.5–18)
IMM GRANULOCYTES # BLD AUTO: 0.09 K/UL (ref 0–0.04)
IMM GRANULOCYTES NFR BLD: 0.9 % (ref 0–0.4)
INR BLD: 2.04
LYMPHOCYTES # BLD AUTO: 1.44 K/UL (ref 1–4.8)
LYMPHOCYTES NFR BLD AUTO: 15 % (ref 27–41)
MCH RBC QN AUTO: 29.5 PG (ref 27–31)
MCHC RBC AUTO-ENTMCNC: 34.3 G/DL (ref 32–36)
MCV RBC AUTO: 86.1 FL (ref 80–96)
MONOCYTES # BLD AUTO: 2.14 K/UL (ref 0–0.8)
MONOCYTES NFR BLD AUTO: 22.2 % (ref 2–6)
MPC BLD CALC-MCNC: 10.7 FL (ref 9.4–12.4)
NEUTROPHILS # BLD AUTO: 5.42 K/UL (ref 1.8–7.7)
NEUTROPHILS NFR BLD AUTO: 56.4 % (ref 53–65)
NRBC # BLD AUTO: 0 X10E3/UL
NRBC, AUTO (.00): 0 %
PLATELET # BLD AUTO: 63 K/UL (ref 150–400)
POTASSIUM SERPL-SCNC: 3 MMOL/L (ref 3.5–5.1)
PROT SERPL-MCNC: 6 G/DL (ref 6.4–8.2)
PROTHROMBIN TIME: 22.7 SECONDS (ref 11.7–14.7)
RBC # BLD AUTO: 2.44 M/UL (ref 4.6–6.2)
SODIUM SERPL-SCNC: 135 MMOL/L (ref 136–145)
UNIT NUMBER: NORMAL
WBC # BLD AUTO: 9.62 K/UL (ref 4.5–11)

## 2023-08-27 PROCEDURE — 25000003 PHARM REV CODE 250: Performed by: STUDENT IN AN ORGANIZED HEALTH CARE EDUCATION/TRAINING PROGRAM

## 2023-08-27 PROCEDURE — 99233 SBSQ HOSP IP/OBS HIGH 50: CPT | Mod: GC,,, | Performed by: FAMILY MEDICINE

## 2023-08-27 PROCEDURE — 25000003 PHARM REV CODE 250

## 2023-08-27 PROCEDURE — 85610 PROTHROMBIN TIME: CPT | Performed by: STUDENT IN AN ORGANIZED HEALTH CARE EDUCATION/TRAINING PROGRAM

## 2023-08-27 PROCEDURE — 63600175 PHARM REV CODE 636 W HCPCS: Performed by: INTERNAL MEDICINE

## 2023-08-27 PROCEDURE — 11000001 HC ACUTE MED/SURG PRIVATE ROOM

## 2023-08-27 PROCEDURE — 63600175 PHARM REV CODE 636 W HCPCS: Performed by: STUDENT IN AN ORGANIZED HEALTH CARE EDUCATION/TRAINING PROGRAM

## 2023-08-27 PROCEDURE — 99233 PR SUBSEQUENT HOSPITAL CARE,LEVL III: ICD-10-PCS | Mod: GC,,, | Performed by: FAMILY MEDICINE

## 2023-08-27 PROCEDURE — P9016 RBC LEUKOCYTES REDUCED: HCPCS

## 2023-08-27 PROCEDURE — 85025 COMPLETE CBC W/AUTO DIFF WBC: CPT | Performed by: STUDENT IN AN ORGANIZED HEALTH CARE EDUCATION/TRAINING PROGRAM

## 2023-08-27 PROCEDURE — 63600175 PHARM REV CODE 636 W HCPCS

## 2023-08-27 PROCEDURE — 82140 ASSAY OF AMMONIA: CPT | Performed by: NURSE PRACTITIONER

## 2023-08-27 PROCEDURE — C9113 INJ PANTOPRAZOLE SODIUM, VIA: HCPCS | Performed by: INTERNAL MEDICINE

## 2023-08-27 PROCEDURE — 80053 COMPREHEN METABOLIC PANEL: CPT | Performed by: STUDENT IN AN ORGANIZED HEALTH CARE EDUCATION/TRAINING PROGRAM

## 2023-08-27 RX ORDER — PROPRANOLOL HYDROCHLORIDE 10 MG/1
10 TABLET ORAL 2 TIMES DAILY
Status: DISCONTINUED | OUTPATIENT
Start: 2023-08-27 | End: 2023-09-09 | Stop reason: HOSPADM

## 2023-08-27 RX ORDER — HYDROCODONE BITARTRATE AND ACETAMINOPHEN 500; 5 MG/1; MG/1
TABLET ORAL
Status: DISCONTINUED | OUTPATIENT
Start: 2023-08-27 | End: 2023-09-02

## 2023-08-27 RX ADMIN — FOLIC ACID 1 MG: 1 TABLET ORAL at 10:08

## 2023-08-27 RX ADMIN — FUROSEMIDE 20 MG: 10 INJECTION, SOLUTION INTRAVENOUS at 10:08

## 2023-08-27 RX ADMIN — OXYCODONE AND ACETAMINOPHEN 1 TABLET: 10; 325 TABLET ORAL at 10:08

## 2023-08-27 RX ADMIN — LACTULOSE 10 G: 20 SOLUTION ORAL at 08:08

## 2023-08-27 RX ADMIN — RIFAXIMIN 550 MG: 550 TABLET ORAL at 08:08

## 2023-08-27 RX ADMIN — PROPRANOLOL HYDROCHLORIDE 10 MG: 10 TABLET ORAL at 08:08

## 2023-08-27 RX ADMIN — THIAMINE HYDROCHLORIDE 500 MG: 100 INJECTION, SOLUTION INTRAMUSCULAR; INTRAVENOUS at 09:08

## 2023-08-27 RX ADMIN — PANTOPRAZOLE SODIUM 40 MG: 40 INJECTION, POWDER, FOR SOLUTION INTRAVENOUS at 10:08

## 2023-08-27 RX ADMIN — SPIRONOLACTONE 25 MG: 25 TABLET ORAL at 10:08

## 2023-08-27 RX ADMIN — SODIUM CHLORIDE: 9 INJECTION, SOLUTION INTRAVENOUS at 01:08

## 2023-08-27 RX ADMIN — FUROSEMIDE 20 MG: 10 INJECTION, SOLUTION INTRAVENOUS at 08:08

## 2023-08-27 RX ADMIN — THIAMINE HYDROCHLORIDE 500 MG: 100 INJECTION, SOLUTION INTRAMUSCULAR; INTRAVENOUS at 04:08

## 2023-08-27 RX ADMIN — THIAMINE HYDROCHLORIDE 500 MG: 100 INJECTION, SOLUTION INTRAMUSCULAR; INTRAVENOUS at 10:08

## 2023-08-27 RX ADMIN — RIFAXIMIN 550 MG: 550 TABLET ORAL at 10:08

## 2023-08-27 NOTE — SUBJECTIVE & OBJECTIVE
Interval History: 8/27: Pt examined at bedside. Ammonia level decreased to 52 with lactulose and rifaxmin. No tremors noted and pt more alert. Pt endorsed 3 loose stools mixed with blood last evening.  Hgb is stable at 7.2. but was noted to have hematochezia with clots 3x this morning. 1U PRBC ordered. Put in for transfer ([facility]FC order) for higher level of care. At 13:25, [facility] has not beds available, per Kaylee Perales RN. At 13:56, [facility] per Dr. Riggs has no available beds & is on diversion.     Review of Systems   Constitutional:  Positive for activity change. Negative for fatigue.   Respiratory:  Negative for chest tightness and shortness of breath.    Cardiovascular:  Positive for leg swelling. Negative for chest pain.   Gastrointestinal:  Positive for abdominal distention and anal bleeding.   Skin:  Negative for rash and wound.   Neurological:  Negative for tremors.     Objective:     Vital Signs (Most Recent):  Temp: 97 °F (36.1 °C) (08/27/23 1345)  Pulse: 92 (08/27/23 1345)  Resp: 16 (08/27/23 1345)  BP: (!) 125/58 (08/27/23 1345)  SpO2: 98 % (08/27/23 1345) Vital Signs (24h Range):  Temp:  [97 °F (36.1 °C)-98.6 °F (37 °C)] 97 °F (36.1 °C)  Pulse:  [84-96] 92  Resp:  [16-18] 16  SpO2:  [97 %-100 %] 98 %  BP: (118-152)/(58-82) 125/58     Weight: (!) 149.7 kg (330 lb 0.5 oz)  Body mass index is 54.92 kg/m².  No intake or output data in the 24 hours ending 08/27/23 1427      Physical Exam  Vitals and nursing note reviewed.   Constitutional:       Appearance: He is obese. He is ill-appearing.      Comments: Pt is somnolent but aroused easily   Eyes:      General: Scleral icterus present.   Cardiovascular:      Rate and Rhythm: Normal rate and regular rhythm.      Heart sounds: Normal heart sounds.   Pulmonary:      Breath sounds: Normal breath sounds.   Abdominal:      General: There is distension.   Musculoskeletal:      Right lower leg: Edema present.      Left lower leg: Edema present.   Skin:     General: Skin  is warm and dry.      Capillary Refill: Capillary refill takes less than 2 seconds.   Neurological:      Mental Status: He is oriented to person, place, and time.             Significant Labs: All pertinent labs within the past 24 hours have been reviewed.    Significant Imaging: I have reviewed all pertinent imaging results/findings within the past 24 hours.

## 2023-08-27 NOTE — ASSESSMENT & PLAN NOTE
Likely secondary to liver cirrhosis    Per Dr. Gusman:  Active arterial bleeding on endoscopy in anal canal; too friable for clip. (In OR) Arterial bleeding at the 9 o'clock position in the anal canal; multiple bleeding hemorrhoidal plexus at 6 o'clock position on the posterior canal and at 3 o'clock position    Patient received 2 units of packed red blood cells in the OR and we will give FFP    8/25: No bleeding overnight  8/26/23 no obvious bleeding on rectal dressing    8/27: Rectal bleeding noted this morning. Rectal dressing/pad saturated with blod and clots. 1U PRBC ordered.

## 2023-08-27 NOTE — ASSESSMENT & PLAN NOTE
Acute blood loss     Per significant other, had hematochezia 3x this morning. Noted pad saturated with blood and clots.  Hgb 7.2. 1U PRBC noted. Dr. Mahajan informed.   Monitor CBC

## 2023-08-27 NOTE — PROGRESS NOTES
[facility] Medicine  Progress Note    Patient Name: John Spears  MRN: [id number]  Patient Class: IP- Inpatient   Admission Date: 8/19/2023  Length of Stay: 7 days  Attending Physician: Jamal Thomas Jr., MD  Primary Care Provider: Ruben, Provider        Subjective:     Principal Problem:Bleeding hemorrhoid        HPI:  Patient is a 49 y/o male with PMH of HTN, HLD, cirrhosis of the liver and hemorrhoids who presents to the ED with complaint of COB and swelling of the abdomen, legs and feet that started about 1 month ago. Patient states that the swelling and SOB has been worsening for the last 2 weeks. Patient reports passing dark blood per rectum with bowel movement for the last 2 weeks. Patient reports having intermittent RUQ when he eats fried foot. Patient reports chills but no fever. Patient denies nausea, vomiting, chest pain or urinary changes. Patient reports drinking about 10 beers of 16 ounces per day for the 3 years and for the last months he stopped drinking beer and started drinking  liquor. He states that 1 fifth of liquor lasts for 3 days. Patient reports withdrawal symptoms in the past. Patient reports that he quit smoking cigarettes 5 years ago.      In the ED showed /67, HR 90, RR 16, SpO2 98% and afebrile. Blood work showed H/H 6.1/19, WBC 5.6, PLT 78, PT 21.2, INR 1.87, PTT 48.2. Sodium 134, potassium 2.8, Alkaline phosphatase 166, Total bilirrubin 6.5, AST/ALST 61/41, p-, troponin 16.7. UA negative for infection or blood. FOBT negative. EKG nsr with HR 89. Patient is receiving PRBC in the ER at this moment and electrolytes are being replenished. Patient will be admitted to the hospital for further management.       Overview/Hospital Course:  No notes on file    Interval History: 8/27: Pt examined at bedside. Ammonia level decreased to 52 with lactulose and rifaxmin. No tremors noted and pt more alert. Pt endorsed 3 loose  stools mixed with blood last evening.  Hgb is stable at 7.2. but was noted to have hematochezia with clots 3x this morning. 1U PRBC ordered. Put in for transfer (PFC order) for higher level of care. At 13:25, [facility] has not beds available, per Kaylee Perales RN. At 13:56, [facility] per Dr. Riggs has no available beds & is on diversion.     Review of Systems   Constitutional:  Positive for activity change. Negative for fatigue.   Respiratory:  Negative for chest tightness and shortness of breath.    Cardiovascular:  Positive for leg swelling. Negative for chest pain.   Gastrointestinal:  Positive for abdominal distention and anal bleeding.   Skin:  Negative for rash and wound.   Neurological:  Negative for tremors.     Objective:     Vital Signs (Most Recent):  Temp: 97 °F (36.1 °C) (08/27/23 1345)  Pulse: 92 (08/27/23 1345)  Resp: 16 (08/27/23 1345)  BP: (!) 125/58 (08/27/23 1345)  SpO2: 98 % (08/27/23 1345) Vital Signs (24h Range):  Temp:  [97 °F (36.1 °C)-98.6 °F (37 °C)] 97 °F (36.1 °C)  Pulse:  [84-96] 92  Resp:  [16-18] 16  SpO2:  [97 %-100 %] 98 %  BP: (118-152)/(58-82) 125/58     Weight: (!) 149.7 kg (330 lb 0.5 oz)  Body mass index is 54.92 kg/m².  No intake or output data in the 24 hours ending 08/27/23 1427      Physical Exam  Vitals and nursing note reviewed.   Constitutional:       Appearance: He is obese. He is ill-appearing.      Comments: Pt is somnolent but aroused easily   Eyes:      General: Scleral icterus present.   Cardiovascular:      Rate and Rhythm: Normal rate and regular rhythm.      Heart sounds: Normal heart sounds.   Pulmonary:      Breath sounds: Normal breath sounds.   Abdominal:      General: There is distension.   Musculoskeletal:      Right lower leg: Edema present.      Left lower leg: Edema present.   Skin:     General: Skin is warm and dry.      Capillary Refill: Capillary refill takes less than 2 seconds.   Neurological:      Mental Status: He is oriented to person, place, and time.              Significant Labs: All pertinent labs within the past 24 hours have been reviewed.    Significant Imaging: I have reviewed all pertinent imaging results/findings within the past 24 hours.        Assessment/Plan:      * Bleeding hemorrhoid  Likely secondary to liver cirrhosis    Per Dr. Gusman:  Active arterial bleeding on endoscopy in anal canal; too friable for clip. (In OR) Arterial bleeding at the 9 o'clock position in the anal canal; multiple bleeding hemorrhoidal plexus at 6 o'clock position on the posterior canal and at 3 o'clock position    Patient received 2 units of packed red blood cells in the OR and we will give FFP    8/25: No bleeding overnight  8/26/23 no obvious bleeding on rectal dressing    8/27: Rectal bleeding noted this morning. Rectal dressing/pad saturated with blod and clots. 1U PRBC ordered.     Hyperammonemia  8/27: Continue with rifaxmin  Started on lactulose on 8/26  Ammonia level 52      Acute lower GI bleeding    Patient received 2 units of packed red blood cells in the OR and we will give FFP  8/25: Hgb 7.8  8/26/23 no obvious rectal bleeding    Hypertension    Monitor BP    Alcoholic cirrhosis of liver  MELD score 22 points. 19.6% with estimated 3 month mortality  CIWA score 0    MRCP reveals cirrhosis and multiple gallstones      8/27: Pt is noted to have scleral icterus, bili 5.5, and continued hematochezia.   Put in for transfer (PFC order) for higher level of care. At 13:25, [facility] has no beds available, per Kaylee Perales RN. At 13:56, [facility] per Dr. Riggs has no available beds & is on diversion.         Anemia  Acute blood loss     Per significant other, had hematochezia 3x this morning. Noted pad saturated with blood and clots.  Hgb 7.2. 1U PRBC noted. Dr. Mahajan informed.   Monitor CBC      Hypoalbuminemia  Likely from malturition + or - synthetic function of the liver  C/w Spironolactone and lasix      VTE Risk Mitigation (From admission, onward)         Ordered      Reason for No Pharmacological VTE Prophylaxis  Once        Question:  Reasons:  Answer:  Active Bleeding    08/20/23 0043     IP VTE HIGH RISK PATIENT  Once         08/20/23 0043     Place sequential compression device  Until discontinued         08/20/23 0043                Discharge Planning   LUCRECIA:      Code Status: Full Code   Is the patient medically ready for discharge?:     Reason for patient still in hospital (select all that apply): Treatment  Discharge Plan A: Home with family                  Valencia Butcher MD  Department of [facility] Medicine   [facility]

## 2023-08-27 NOTE — PROGRESS NOTES
[facility] - [address] [place] [address]  General Surgery  Progress Note    Subjective:     Interval History:  Patient had some clots bowel movements this morning.  H&H stable no fresh blood coming out and vitals are stable.    Post-Op Info:  Procedure(s) (LRB):  EXAM UNDER ANESTHESIA, DIGITAL, RECTUM (N/A)  RETURN TO OPERATING ROOM, FOR MINOR POSTOPERATIVE HEMORRHAGE CONTROL  HEMORRHOIDECTOMY (N/A)   3 Days Post-Op      Medications:  Continuous Infusions:   lactated ringers       Scheduled Meds:   folic acid  1 mg Oral Daily    furosemide (LASIX) injection  20 mg Intravenous Q12H    lactulose  10 g Oral BID    pantoprazole  40 mg Intravenous Daily    potassium bicarbonate  40 mEq Oral Once    rifAXImin  550 mg Oral BID    spironolactone  25 mg Oral Daily    thiamine (B-1) 500 mg in dextrose 5 % (D5W) 100 mL IVPB  500 mg Intravenous TID     PRN Meds:0.9%  NaCl infusion (for blood administration), 0.9%  NaCl infusion (for blood administration), 0.9%  NaCl infusion (for blood administration), 0.9%  NaCl infusion (for blood administration), 0.9%  NaCl infusion (for blood administration), acetaminophen, HYDROmorphone, LIDOcaine HCl 2%, lorazepam, melatonin, naloxone, ondansetron, ondansetron, oxyCODONE-acetaminophen, prochlorperazine, simethicone, sodium chloride 0.9%     Objective:     Vital Signs (Most Recent):  Temp: 97.6 °F (36.4 °C) (08/27/23 0700)  Pulse: 87 (08/27/23 0700)  Resp: 18 (08/27/23 1004)  BP: (!) 141/82 (08/27/23 0700)  SpO2: 97 % (08/27/23 0700) Vital Signs (24h Range):  Temp:  [97.6 °F (36.4 °C)-98 °F (36.7 °C)] 97.6 °F (36.4 °C)  Pulse:  [84-96] 87  Resp:  [17-18] 18  SpO2:  [97 %-100 %] 97 %  BP: (118-152)/(47-82) 141/82     No intake or output data in the 24 hours ending 08/27/23 1054    Physical Exam  Constitutional:       General: He is not in acute distress.  HENT:      Head: Normocephalic.   Cardiovascular:      Rate and Rhythm: Normal rate and regular rhythm.      Pulses: Normal  pulses.   Pulmonary:      Effort: Pulmonary effort is normal. No respiratory distress.      Breath sounds: Normal breath sounds.   Abdominal:      General: Abdomen is flat. There is no distension.      Palpations: Abdomen is soft.      Tenderness: There is no abdominal tenderness.   Musculoskeletal:         General: Normal range of motion.   Skin:     General: Skin is warm.   Neurological:      General: No focal deficit present.      Mental Status: He is oriented to person, place, and time.         Significant Labs:  CBC:   Recent Labs   Lab 08/27/23  0732   WBC 9.62   RBC 2.44*   HGB 7.2*   HCT 21.0*   PLT 63*   MCV 86.1   MCH 29.5   MCHC 34.3     CMP:   Recent Labs   Lab 08/27/23  0732   *   CALCIUM 7.8*   ALBUMIN 1.7*   PROT 6.0*   *   K 3.0*   CO2 29      BUN 19*   CREATININE 1.39*   ALKPHOS 117*   ALT 24   AST 49*   BILITOT 5.5*       Significant Diagnostics:  None    Assessment/Plan:     Active Diagnoses:    Diagnosis Date Noted POA    PRINCIPAL PROBLEM:  Bleeding hemorrhoid [K64.9] 08/20/2023 Yes    Hyperammonemia [E72.20] 08/25/2023 Unknown    Alcoholic cirrhosis of liver [K70.30] 08/20/2023 Yes    Hypertension [I10] 08/20/2023 Yes    Acute lower GI bleeding [K92.2] 08/20/2023 Unknown    Hypoalbuminemia [E88.09] 08/19/2023 Yes    Anemia [D64.9] 08/19/2023 Yes      Problems Resolved During this Admission:    Diagnosis Date Noted Date Resolved POA    Ascites [R18.8] 08/19/2023 08/20/2023 Yes     Pictures showed mostly old clots hopefully.  No fresh active bleeding.  H&H is stable vital stable hopefully will just watch this and if there is any actual bright red broad or changes in patient's condition might require intervention versus transfusion.    Ye Mahajan MD  General Surgery  [facility] - [address] [place] [address]

## 2023-08-27 NOTE — ASSESSMENT & PLAN NOTE
MELD score 22 points. 19.6% with estimated 3 month mortality  CIWA score 0    MRCP reveals cirrhosis and multiple gallstones      8/27: Pt is noted to have scleral icterus, bili 5.5, and continued hematochezia.   Put in for transfer (PFC order) for higher level of care. At 13:25, [facility] has no beds available, per Kaylee Perales RN. At 13:56, [facility] per Dr. Riggs has no available beds & is on diversion.  Call [facility] on 8/28 to see if any beds are available [phone]

## 2023-08-28 LAB
ALBUMIN SERPL BCP-MCNC: 1.6 G/DL (ref 3.5–5)
ALBUMIN/GLOB SERPL: 0.4 {RATIO}
ALP SERPL-CCNC: 105 U/L (ref 45–115)
ALT SERPL W P-5'-P-CCNC: 24 U/L (ref 16–61)
ANION GAP SERPL CALCULATED.3IONS-SCNC: 6 MMOL/L (ref 7–16)
AORTIC ROOT ANNULUS: 2.79 CM
AORTIC VALVE CUSP SEPERATION: 2.3 CM
AST SERPL W P-5'-P-CCNC: 51 U/L (ref 15–37)
AV INDEX (PROSTH): 0.87
AV MEAN GRADIENT: 5 MMHG
AV PEAK GRADIENT: 8 MMHG
AV VALVE AREA BY VELOCITY RATIO: 2.74 CM²
AV VALVE AREA: 2.9 CM²
AV VELOCITY RATIO: 0.82
BASOPHILS # BLD AUTO: 0.09 K/UL (ref 0–0.2)
BASOPHILS NFR BLD AUTO: 0.8 % (ref 0–1)
BILIRUB SERPL-MCNC: 6 MG/DL (ref ?–1.2)
BSA FOR ECHO PROCEDURE: 2.54 M2
BUN SERPL-MCNC: 19 MG/DL (ref 7–18)
BUN/CREAT SERPL: 12 (ref 6–20)
CALCIUM SERPL-MCNC: 7.7 MG/DL (ref 8.5–10.1)
CHLORIDE SERPL-SCNC: 99 MMOL/L (ref 98–107)
CO2 SERPL-SCNC: 32 MMOL/L (ref 21–32)
CREAT SERPL-MCNC: 1.54 MG/DL (ref 0.7–1.3)
CV ECHO LV RWT: 0.41 CM
DIFFERENTIAL METHOD BLD: ABNORMAL
DOP CALC AO PEAK VEL: 1.4 M/S
DOP CALC AO VTI: 27.9 CM
DOP CALC LVOT AREA: 3.3 CM2
DOP CALC LVOT DIAMETER: 2.06 CM
DOP CALC LVOT PEAK VEL: 1.15 M/S
DOP CALC LVOT STROKE VOLUME: 80.95 CM3
DOP CALCLVOT PEAK VEL VTI: 24.3 CM
E WAVE DECELERATION TIME: 215.92 MSEC
E/A RATIO: 2.31
E/E' RATIO: 8.57 M/S
ECHO LV POSTERIOR WALL: 1.28 CM (ref 0.6–1.1)
EGFR (NO RACE VARIABLE) (RUSH/TITUS): 55 ML/MIN/1.73M2
EOSINOPHIL # BLD AUTO: 0.78 K/UL (ref 0–0.5)
EOSINOPHIL NFR BLD AUTO: 6.8 % (ref 1–4)
ERYTHROCYTE [DISTWIDTH] IN BLOOD BY AUTOMATED COUNT: 19.8 % (ref 11.5–14.5)
ESTROGEN SERPL-MCNC: NORMAL PG/ML
FRACTIONAL SHORTENING: 38 % (ref 28–44)
GLOBULIN SER-MCNC: 3.9 G/DL (ref 2–4)
GLUCOSE SERPL-MCNC: 93 MG/DL (ref 74–106)
HCT VFR BLD AUTO: 21.8 % (ref 40–54)
HCT VFR BLD AUTO: 24.2 % (ref 40–54)
HGB BLD-MCNC: 7.2 G/DL (ref 13.5–18)
HGB BLD-MCNC: 8 G/DL (ref 13.5–18)
IMM GRANULOCYTES # BLD AUTO: 0.1 K/UL (ref 0–0.04)
IMM GRANULOCYTES NFR BLD: 0.9 % (ref 0–0.4)
INR BLD: 2.1
INSULIN SERPL-ACNC: NORMAL U[IU]/ML
INTERVENTRICULAR SEPTUM: 1.24 CM (ref 0.6–1.1)
IVC DIAMETER: 1.87 CM
LA MAJOR: 4.49 CM
LAB AP GROSS DESCRIPTION: NORMAL
LAB AP LABORATORY NOTES: NORMAL
LEFT ATRIUM SIZE: 4.4 CM
LEFT INTERNAL DIMENSION IN SYSTOLE: 3.89 CM (ref 2.1–4)
LEFT VENTRICLE DIASTOLIC VOLUME INDEX: 81.34 ML/M2
LEFT VENTRICLE DIASTOLIC VOLUME: 196.84 ML
LEFT VENTRICLE MASS INDEX: 148 G/M2
LEFT VENTRICLE SYSTOLIC VOLUME INDEX: 27.1 ML/M2
LEFT VENTRICLE SYSTOLIC VOLUME: 65.56 ML
LEFT VENTRICULAR INTERNAL DIMENSION IN DIASTOLE: 6.24 CM (ref 3.5–6)
LEFT VENTRICULAR MASS: 357.74 G
LV LATERAL E/E' RATIO: 9.23 M/S
LV SEPTAL E/E' RATIO: 8 M/S
LVOT MG: 3.25 MMHG
LVOT MV: 0.86 CM/S
LYMPHOCYTES # BLD AUTO: 1.5 K/UL (ref 1–4.8)
LYMPHOCYTES NFR BLD AUTO: 13.2 % (ref 27–41)
MCH RBC QN AUTO: 29.3 PG (ref 27–31)
MCHC RBC AUTO-ENTMCNC: 33 G/DL (ref 32–36)
MCV RBC AUTO: 88.6 FL (ref 80–96)
MONOCYTES # BLD AUTO: 2.19 K/UL (ref 0–0.8)
MONOCYTES NFR BLD AUTO: 19.2 % (ref 2–6)
MPC BLD CALC-MCNC: 11.2 FL (ref 9.4–12.4)
MV PEAK A VEL: 0.52 M/S
MV PEAK E VEL: 1.2 M/S
MV STENOSIS PRESSURE HALF TIME: 62.62 MS
MV VALVE AREA P 1/2 METHOD: 3.51 CM2
NEUTROPHILS # BLD AUTO: 6.73 K/UL (ref 1.8–7.7)
NEUTROPHILS NFR BLD AUTO: 59.1 % (ref 53–65)
NRBC # BLD AUTO: 0 X10E3/UL
NRBC, AUTO (.00): 0 %
PISA TR MAX VEL: 2.69 M/S
PLATELET # BLD AUTO: 73 K/UL (ref 150–400)
POTASSIUM SERPL-SCNC: 3.2 MMOL/L (ref 3.5–5.1)
POTASSIUM SERPL-SCNC: 3.4 MMOL/L (ref 3.5–5.1)
PROT SERPL-MCNC: 5.5 G/DL (ref 6.4–8.2)
PROTHROMBIN TIME: 23.2 SECONDS (ref 11.7–14.7)
PV PEAK GRADIENT: 11 MMHG
PV PEAK VELOCITY: 1.65 M/S
RA MAJOR: 4.18 CM
RA PRESSURE ESTIMATED: 8 MMHG
RBC # BLD AUTO: 2.46 M/UL (ref 4.6–6.2)
RIGHT VENTRICULAR END-DIASTOLIC DIMENSION: 4.27 CM
RV TB RVSP: 11 MMHG
SODIUM SERPL-SCNC: 134 MMOL/L (ref 136–145)
T3RU NFR SERPL: NORMAL %
TDI LATERAL: 0.13 M/S
TDI SEPTAL: 0.15 M/S
TDI: 0.14 M/S
TR MAX PG: 29 MMHG
TRICUSPID ANNULAR PLANE SYSTOLIC EXCURSION: 2.69 CM
TV REST PULMONARY ARTERY PRESSURE: 37 MMHG
WBC # BLD AUTO: 11.39 K/UL (ref 4.5–11)
Z-SCORE OF LEFT VENTRICULAR DIMENSION IN END DIASTOLE: -6.06
Z-SCORE OF LEFT VENTRICULAR DIMENSION IN END SYSTOLE: -4.46

## 2023-08-28 PROCEDURE — 25000003 PHARM REV CODE 250: Performed by: STUDENT IN AN ORGANIZED HEALTH CARE EDUCATION/TRAINING PROGRAM

## 2023-08-28 PROCEDURE — 11000001 HC ACUTE MED/SURG PRIVATE ROOM

## 2023-08-28 PROCEDURE — 85610 PROTHROMBIN TIME: CPT | Performed by: STUDENT IN AN ORGANIZED HEALTH CARE EDUCATION/TRAINING PROGRAM

## 2023-08-28 PROCEDURE — 63600175 PHARM REV CODE 636 W HCPCS: Performed by: INTERNAL MEDICINE

## 2023-08-28 PROCEDURE — 25000003 PHARM REV CODE 250

## 2023-08-28 PROCEDURE — C9113 INJ PANTOPRAZOLE SODIUM, VIA: HCPCS | Performed by: INTERNAL MEDICINE

## 2023-08-28 PROCEDURE — 84132 ASSAY OF SERUM POTASSIUM: CPT

## 2023-08-28 PROCEDURE — 99232 PR SUBSEQUENT HOSPITAL CARE,LEVL II: ICD-10-PCS | Mod: GC,,, | Performed by: FAMILY MEDICINE

## 2023-08-28 PROCEDURE — 85025 COMPLETE CBC W/AUTO DIFF WBC: CPT | Performed by: STUDENT IN AN ORGANIZED HEALTH CARE EDUCATION/TRAINING PROGRAM

## 2023-08-28 PROCEDURE — 85014 HEMATOCRIT: CPT

## 2023-08-28 PROCEDURE — 99232 SBSQ HOSP IP/OBS MODERATE 35: CPT | Mod: GC,,, | Performed by: FAMILY MEDICINE

## 2023-08-28 PROCEDURE — 63600175 PHARM REV CODE 636 W HCPCS

## 2023-08-28 PROCEDURE — 80053 COMPREHEN METABOLIC PANEL: CPT | Performed by: STUDENT IN AN ORGANIZED HEALTH CARE EDUCATION/TRAINING PROGRAM

## 2023-08-28 PROCEDURE — 63600175 PHARM REV CODE 636 W HCPCS: Performed by: STUDENT IN AN ORGANIZED HEALTH CARE EDUCATION/TRAINING PROGRAM

## 2023-08-28 RX ORDER — POTASSIUM CHLORIDE 20 MEQ/1
40 TABLET, EXTENDED RELEASE ORAL ONCE
Status: DISCONTINUED | OUTPATIENT
Start: 2023-08-28 | End: 2023-08-28

## 2023-08-28 RX ORDER — POTASSIUM CHLORIDE 20 MEQ/1
40 TABLET, EXTENDED RELEASE ORAL ONCE
Status: COMPLETED | OUTPATIENT
Start: 2023-08-28 | End: 2023-08-28

## 2023-08-28 RX ADMIN — THIAMINE HYDROCHLORIDE 500 MG: 100 INJECTION, SOLUTION INTRAMUSCULAR; INTRAVENOUS at 10:08

## 2023-08-28 RX ADMIN — THIAMINE HYDROCHLORIDE 500 MG: 100 INJECTION, SOLUTION INTRAMUSCULAR; INTRAVENOUS at 08:08

## 2023-08-28 RX ADMIN — PANTOPRAZOLE SODIUM 40 MG: 40 INJECTION, POWDER, FOR SOLUTION INTRAVENOUS at 09:08

## 2023-08-28 RX ADMIN — LACTULOSE 10 G: 20 SOLUTION ORAL at 10:08

## 2023-08-28 RX ADMIN — RIFAXIMIN 550 MG: 550 TABLET ORAL at 09:08

## 2023-08-28 RX ADMIN — POTASSIUM CHLORIDE 40 MEQ: 1500 TABLET, EXTENDED RELEASE ORAL at 09:08

## 2023-08-28 RX ADMIN — LACTULOSE 10 G: 20 SOLUTION ORAL at 09:08

## 2023-08-28 RX ADMIN — OXYCODONE AND ACETAMINOPHEN 1 TABLET: 10; 325 TABLET ORAL at 02:08

## 2023-08-28 RX ADMIN — FUROSEMIDE 20 MG: 10 INJECTION, SOLUTION INTRAVENOUS at 09:08

## 2023-08-28 RX ADMIN — FOLIC ACID 1 MG: 1 TABLET ORAL at 09:08

## 2023-08-28 RX ADMIN — THIAMINE HYDROCHLORIDE 500 MG: 100 INJECTION, SOLUTION INTRAMUSCULAR; INTRAVENOUS at 03:08

## 2023-08-28 RX ADMIN — PROPRANOLOL HYDROCHLORIDE 10 MG: 10 TABLET ORAL at 09:08

## 2023-08-28 RX ADMIN — RIFAXIMIN 550 MG: 550 TABLET ORAL at 10:08

## 2023-08-28 NOTE — SUBJECTIVE & OBJECTIVE
Interval History:   Stable, no acute events overnight.  No significant bleeding over weekend.  Patient states he may see a small amount of blood mixed with stool and having a bowel movement and when he wipes; no significant bleed as compared to previous  Medications:  Continuous Infusions:   lactated ringers         Scheduled Meds:   folic acid  1 mg Oral Daily    furosemide (LASIX) injection  20 mg Intravenous Q12H    lactulose  10 g Oral BID    pantoprazole  40 mg Intravenous Daily    potassium chloride  40 mEq Oral Once    propranoloL  10 mg Oral BID    rifAXImin  550 mg Oral BID    spironolactone  25 mg Oral Daily    thiamine (B-1) 500 mg in dextrose 5 % (D5W) 100 mL IVPB  500 mg Intravenous TID     PRN Meds:0.9%  NaCl infusion (for blood administration), acetaminophen, HYDROmorphone, LIDOcaine HCl 2%, lorazepam, melatonin, naloxone, ondansetron, ondansetron, oxyCODONE-acetaminophen, prochlorperazine, simethicone, sodium chloride 0.9%     Review of patient's allergies indicates:  No Known Allergies  Objective:     Vital Signs (Most Recent):  Temp: 98.3 °F (36.8 °C) (08/28/23 0639)  Pulse: 68 (08/28/23 0639)  Resp: 16 (08/28/23 0639)  BP: (!) 110/57 (08/28/23 0639)  SpO2: 100 % (08/28/23 0639) Vital Signs (24h Range):  Temp:  [96.6 °F (35.9 °C)-98.6 °F (37 °C)] 98.3 °F (36.8 °C)  Pulse:  [68-96] 68  Resp:  [16-20] 16  SpO2:  [95 %-100 %] 100 %  BP: ()/(52-79) 110/57     Weight: (!) 149.7 kg (330 lb 0.5 oz)  Body mass index is 54.92 kg/m².    Intake/Output - Last 3 Shifts         08/26 0700 08/27 0659 08/27 0700 08/28 0659 08/28 0700 08/29 0659    P.O.  480     I.V. (mL/kg)  566 (3.8)     Blood  366     Total Intake(mL/kg)  1412 (9.4)     Urine (mL/kg/hr)  300 (0.1)     Stool  0     Total Output  300     Net  +1112            Urine Occurrence 3 x 9 x     Stool Occurrence 2 x 4 x              Physical Exam  Vitals reviewed.   Cardiovascular:      Rate and Rhythm: Normal rate.   Pulmonary:      Effort:  Pulmonary effort is normal. No respiratory distress.   Skin:     General: Skin is warm and dry.   Neurological:      Mental Status: He is alert. Mental status is at baseline.          Significant Labs:  I have reviewed all pertinent lab results within the past 24 hours.  CBC:   Recent Labs   Lab 08/28/23  0543   WBC 11.39*   RBC 2.46*   HGB 7.2*   HCT 21.8*   PLT 73*   MCV 88.6   MCH 29.3   MCHC 33.0       CMP:   Recent Labs   Lab 08/28/23  0543   GLU 93   CALCIUM 7.7*   ALBUMIN 1.6*   PROT 5.5*   *   K 3.2*   CO2 32   CL 99   BUN 19*   CREATININE 1.54*   ALKPHOS 105   ALT 24   AST 51*   BILITOT 6.0*         Significant Diagnostics:  I have reviewed all pertinent imaging results/findings within the past 24 hours.

## 2023-08-28 NOTE — ASSESSMENT & PLAN NOTE
To the OR for rectal exam under anesthesia with possible hemorrhoidectomy.      Risks and benefits explained with the patient including risks for infection, bleeding, injury to surrounding structures, hematoma/seroma formation with need for possible evacuation, possible open.  The patient verbalized understanding, agrees and wishes to proceed with surgery.    Hemoglobin 6.1; currently receiving 2nd unit of packed red blood cells.    8/25: s/p takeback for continued rectal bleeding; arterial bleed ligated; external hemorrhoids excised; dressing in place. With cirrhosis and portal hypertension, if patient rebleeds, we can pack the area and patient will need to be transferred to higher level of care (Discussed with family; 1st choice is [facility]) for hepatobiliary and colorectal management. Discussed with Hospitalist team. Hgb 7.8 (was 7.9 yesterday s/p transfusion). Platelets 66; monitor    8/28:  Hemoglobin 7.2; stable over weekend.  Rectal hemorrhage resolved; can see a small amount of bleeding from hemorrhoidectomy/procedure.  Medicine managing cirrhosis.  If patient bleeds a 3rd time, patient will need to be transfused and transferred to higher level of care.  General surgery will sign off; available if starts bleeding again to control bleeding for transfer

## 2023-08-28 NOTE — PROGRESS NOTES
[facility] - [address] [place] [address]  Adult Nutrition  First Assessment Note         Reason for Assessment  Reason For Assessment: length of stay   Nutrition Risk Screen: no indicators present    Assessment and Plan  Patient assessed for length of stay. He was admitted 8/19 for bleeding hemorrhoid.     Patient is 330 pounds with a BMI of 54.92 and is morbidly obese. He is ordered a Regular diet. No intake documented. Recommend continue current diet as able/appropriate and tolerated. Encourage good po intakes.     Last BM 8/28 per flowsheet.    Medications/labs reviewed.         Skin Integrity  Luis Risk Assessment  Sensory Perception: 4-->no impairment  Moisture: 3-->occasionally moist  Activity: 3-->walks occasionally  Mobility: 2-->very limited  Nutrition: 3-->adequate  Friction and Shear: 2-->potential problem  Luis Score: 17        Malnutrition  Is patient malnourished? No      Nutrition Diagnosis    Overweight related to excessive po intakes as evidenced by elevated BMI    Interventions/Recommendations (treatment strategy):  Recommend continue current diet as able/appropirate and tolerated. Encourage good po intakes.    Nutrition Diagnosis Status:   New     Nutrition Risk  Level of Risk/Frequency of Follow-up: low    Recent Labs   Lab 08/28/23  0543   GLU 93       Nutrition Prescription / Recommendations  Recommendation/Intervention: Recommend continue current diet as able/appropirate and tolerated. Encourage good po intakes.  Goals: Weight maintenance, intake % of meals  Nutrition Goal Status: new  Current Diet Order: Regular  Chewing or Swallowing Difficulty?: No Chewing or swallowing difficulty  Recommended Diet: Regular  Recommended Oral Supplement: No Oral Supplements  Is Nutrition Support Recommended: No   Is Education Recommended: No  Monitor and Evaluation  % current Intake: New Diet order with no P.O. intake documented currently  % intake to meet estimated needs: 75 - 100 %  Food  "and Nutrient Adminstration: diet order  Anthropometric Measurements: weight, weight change  Biochemical Data, Medical Tests and Procedures: electrolyte and renal panel, gastrointestinal profile, glucose/endocrine profile, inflammatory profile, lipid profile     Current Medical Diagnosis and Past Medical History  Diagnosis: other (see comments)  Past Medical History:   Diagnosis Date    Fatty liver     History of alcohol abuse     Hypertension     Mixed hyperlipidemia     Thyroid disease      Nutrition/Diet History     Lab/Procedures/Meds  Recent Labs   Lab 08/28/23  0543   *   K 3.2*   BUN 19*   CREATININE 1.54*   CALCIUM 7.7*   ALBUMIN 1.6*   CL 99   ALT 24   AST 51*   Note: Na+, K+, Ca++, Alb low. BUN/Cr elevated.   Last A1c: No results found for: "HGBA1C"  Lab Results   Component Value Date    RBC 2.46 (L) 08/28/2023    HGB 7.2 (L) 08/28/2023    HCT 21.8 (L) 08/28/2023    MCV 88.6 08/28/2023    MCH 29.3 08/28/2023    MCHC 33.0 08/28/2023    TIBC 340 08/22/2023     Pertinent Labs Reviewed: reviewed  Pertinent Labs Comments: Sodium: 134 (L)  Potassium: 3.2 (L)  Chloride: 99  CO2: 32  Anion Gap: 6 (L)  BUN: 19 (H)  Creatinine: 1.54 (H)  BUN/CREAT RATIO: 12  eGFR: 55 (L)  Glucose: 93  Calcium: 7.7 (L)  Alkaline Phosphatase: 105  PROTEIN TOTAL: 5.5 (L)  Albumin: 1.6 (L)  Albumin/Globulin Ratio: 0.4  BILIRUBIN TOTAL: 6.0 (H)  AST: 51 (H)  ALT: 24  Globulin, Total: 3.9  Pertinent Medications Reviewed: reviewed  Pertinent Medications Comments: folic acid, furosemide, lactulose, pantoprazole, propanolol, rifamixin, spironlactone, thiamine  Anthropometrics  Temp: 98.3 °F (36.8 °C)  Height Method: Stated  Height: 5' 5" (165.1 cm)  Height (inches): 65 in  Weight Method: Bed Scale  Weight: (!) 149.7 kg (330 lb 0.5 oz)  Weight (lb): (!) 330.03 lb  Ideal Body Weight (IBW), Male: 136 lb  % Ideal Body Weight, Male (lb): 236.03 %  BMI (Calculated): 54.9     Estimated/Assessed Needs  RMR ([place]-[employer] [employer] Equation): 4313.88 "     Temp: 98.3 °F (36.8 °C)Axillary  Weight Used For Calorie Calculations: 83.9 kg (185 lb)     Energy Calorie Requirements (kcal): 2097-2517kcal (25-30kcal/kg Adj BW)  Weight Used For Protein Calculations: 83.9 kg (185 lb)  Protein Requirements: 67-84g (0.8-1.0g/kg Adj BW)       RDA Method (mL): 2097     Nutrition by Nursing  Diet/Nutrition Received: regular           Last Bowel Movement: 08/28/23                Nutrition Follow-Up  RD Follow-up?: Yes      Reta Michelle, MS, RD, LD  Available through Secure Chat

## 2023-08-28 NOTE — PROGRESS NOTES
[facility] Hospital Medicine  Progress Note    Patient Name: John Spears  MRN: [id number]  Patient Class: IP- Inpatient   Admission Date: 8/19/2023  Length of Stay: 8 days  Attending Physician: Jamal Thomas Jr., MD  Primary Care Provider: Ruben Provider        Subjective:     Principal Problem:Bleeding hemorrhoid        HPI:  Patient is a 49 y/o male with PMH of HTN, HLD, cirrhosis of the liver and hemorrhoids who presents to the ED with complaint of COB and swelling of the abdomen, legs and feet that started about 1 month ago. Patient states that the swelling and SOB has been worsening for the last 2 weeks. Patient reports passing dark blood per rectum with bowel movement for the last 2 weeks. Patient reports having intermittent RUQ when he eats fried foot. Patient reports chills but no fever. Patient denies nausea, vomiting, chest pain or urinary changes. Patient reports drinking about 10 beers of 16 ounces per day for the 3 years and for the last months he stopped drinking beer and started drinking  liquor. He states that 1 fifth of liquor lasts for 3 days. Patient reports withdrawal symptoms in the past. Patient reports that he quit smoking cigarettes 5 years ago.      In the ED showed /67, HR 90, RR 16, SpO2 98% and afebrile. Blood work showed H/H 6.1/19, WBC 5.6, PLT 78, PT 21.2, INR 1.87, PTT 48.2. Sodium 134, potassium 2.8, Alkaline phosphatase 166, Total bilirrubin 6.5, AST/ALST 61/41, p-, troponin 16.7. UA negative for infection or blood. FOBT negative. EKG nsr with HR 89. Patient is receiving PRBC in the ER at this moment and electrolytes are being replenished. Patient will be admitted to the hospital for further management.       Overview/Hospital Course:  No notes on file    Interval History: Patient was examined at bed side and condition remains unchanged. We are monitoring his H&H with plan to transfuse as indicated. General surgery  has signed off and recommends that patient transfer to higher level of care if continuing to bleed.    Review of Systems   Constitutional:  Positive for activity change. Negative for fatigue.   Respiratory:  Negative for chest tightness and shortness of breath.    Cardiovascular:  Positive for leg swelling. Negative for chest pain.   Gastrointestinal:  Positive for abdominal distention and anal bleeding.   Skin:  Negative for rash and wound.   Neurological:  Negative for tremors.     Objective:     Vital Signs (Most Recent):  Temp: 98.3 °F (36.8 °C) (08/28/23 1247)  Pulse: 74 (08/28/23 1247)  Resp: 17 (08/28/23 1247)  BP: 123/61 (08/28/23 1247)  SpO2: 98 % (08/28/23 1247) Vital Signs (24h Range):  Temp:  [97.7 °F (36.5 °C)-98.4 °F (36.9 °C)] 98.3 °F (36.8 °C)  Pulse:  [68-96] 74  Resp:  [16-20] 17  SpO2:  [95 %-100 %] 98 %  BP: ()/(52-79) 123/61     Weight: (!) 149.7 kg (330 lb 0.5 oz)  Body mass index is 54.92 kg/m².    Intake/Output Summary (Last 24 hours) at 8/28/2023 1400  Last data filed at 8/28/2023 0000  Gross per 24 hour   Intake 1412 ml   Output 300 ml   Net 1112 ml         Physical Exam  Vitals and nursing note reviewed.   Constitutional:       Appearance: He is obese. He is ill-appearing.      Comments: Pt is somnolent but aroused easily   HENT:      Head: Normocephalic.      Right Ear: External ear normal.   Eyes:      General: Scleral icterus present.   Cardiovascular:      Rate and Rhythm: Normal rate and regular rhythm.      Heart sounds: Normal heart sounds.   Pulmonary:      Breath sounds: Normal breath sounds.   Abdominal:      General: There is distension.   Musculoskeletal:      Right lower leg: Edema present.      Left lower leg: Edema present.   Skin:     General: Skin is warm and dry.      Capillary Refill: Capillary refill takes less than 2 seconds.   Neurological:      Mental Status: He is oriented to person, place, and time.             Significant Labs: All pertinent labs within the  past 24 hours have been reviewed.    Significant Imaging: I have reviewed all pertinent imaging results/findings within the past 24 hours.  I have reviewed and interpreted all pertinent imaging results/findings within the past 24 hours.      Assessment/Plan:      * Bleeding hemorrhoid  Likely secondary to liver cirrhosis    Per Dr. Gusman:  Active arterial bleeding on endoscopy in anal canal; too friable for clip. (In OR) Arterial bleeding at the 9 o'clock position in the anal canal; multiple bleeding hemorrhoidal plexus at 6 o'clock position on the posterior canal and at 3 o'clock position    Patient received 2 units of packed red blood cells in the OR and we will give FFP    8/25: No bleeding overnight  8/26/23 no obvious bleeding on rectal dressing    8/27: Rectal bleeding noted this morning. Rectal dressing/pad saturated with blod and clots. 1U PRBC ordered.     Hyperammonemia  8/27: Continue with rifaxmin  Started on lactulose on 8/26  Ammonia level 52      Acute lower GI bleeding    Patient received 2 units of packed red blood cells in the OR and we will give FFP  8/25: Hgb 7.8  8/26/23 no obvious rectal bleeding    Hypertension    Monitor BP    Alcoholic cirrhosis of liver  MELD score 22 points. 19.6% with estimated 3 month mortality  CIWA score 0    MRCP reveals cirrhosis and multiple gallstones      8/27: Pt is noted to have scleral icterus, bili 5.5, and continued hematochezia.   Put in for transfer (PFC order) for higher level of care. At 13:25, [facility] has no beds available, per Kaylee Perales RN. At 13:56, [facility] per Dr. Riggs has no available beds & is on diversion.  Call [facility] on 8/28 to see if any beds are available [phone]         Anemia  Acute blood loss     Per significant other, had hematochezia 3x this morning. Noted pad saturated with blood and clots.  Hgb 7.2. 1U PRBC noted. Dr. Mahajan informed.   Monitor CBC      Hypoalbuminemia  Likely from malturition + or - synthetic function of the  liver  C/w Spironolactone and lasix    VTE Risk Mitigation (From admission, onward)         Ordered     Reason for No Pharmacological VTE Prophylaxis  Once        Question:  Reasons:  Answer:  Active Bleeding    08/20/23 0043     IP VTE HIGH RISK PATIENT  Once         08/20/23 0043     Place sequential compression device  Until discontinued         08/20/23 0043                Discharge Planning   LUCRECIA:      Code Status: Full Code   Is the patient medically ready for discharge?:     Reason for patient still in hospital (select all that apply): Treatment  Discharge Plan A: Home with family                  Luther Carvalho MD  Department of Hospital Medicine   [facility]

## 2023-08-28 NOTE — ASSESSMENT & PLAN NOTE
MELD score 22 points. 19.6% with estimated 3 month mortality  CIWA score 0    MRCP reveals cirrhosis and multiple gallstones      8/27: Pt is noted to have scleral icterus, bili 5.5, and continued hematochezia.   Put in for transfer (PFC order) for higher level of care. At 13:25, [facility] has no beds available, per Kaylee Perales RN. At 13:56, [facility] per Dr. Riggs has no available beds & is on diversion.  Call [facility] on 8/28 to see if any beds are available [phone]

## 2023-08-28 NOTE — PLAN OF CARE
Chart reviewed. Surgery has signed off. If pt has any further bleeding may have to be transferred to [facility] and blood transfusion. SS will continue to follow for discharge needs.

## 2023-08-28 NOTE — SUBJECTIVE & OBJECTIVE
Interval History: Patient was examined at bed side and condition remains unchanged. We are monitoring his H&H with plan to transfuse as indicated. General surgery has signed off and recommends that patient transfer to higher level of care if continuing to bleed.    Review of Systems   Constitutional:  Positive for activity change. Negative for fatigue.   Respiratory:  Negative for chest tightness and shortness of breath.    Cardiovascular:  Positive for leg swelling. Negative for chest pain.   Gastrointestinal:  Positive for abdominal distention and anal bleeding.   Skin:  Negative for rash and wound.   Neurological:  Negative for tremors.     Objective:     Vital Signs (Most Recent):  Temp: 98.3 °F (36.8 °C) (08/28/23 1247)  Pulse: 74 (08/28/23 1247)  Resp: 17 (08/28/23 1247)  BP: 123/61 (08/28/23 1247)  SpO2: 98 % (08/28/23 1247) Vital Signs (24h Range):  Temp:  [97.7 °F (36.5 °C)-98.4 °F (36.9 °C)] 98.3 °F (36.8 °C)  Pulse:  [68-96] 74  Resp:  [16-20] 17  SpO2:  [95 %-100 %] 98 %  BP: ()/(52-79) 123/61     Weight: (!) 149.7 kg (330 lb 0.5 oz)  Body mass index is 54.92 kg/m².    Intake/Output Summary (Last 24 hours) at 8/28/2023 1400  Last data filed at 8/28/2023 0000  Gross per 24 hour   Intake 1412 ml   Output 300 ml   Net 1112 ml         Physical Exam  Vitals and nursing note reviewed.   Constitutional:       Appearance: He is obese. He is ill-appearing.      Comments: Pt is somnolent but aroused easily   HENT:      Head: Normocephalic.      Right Ear: External ear normal.   Eyes:      General: Scleral icterus present.   Cardiovascular:      Rate and Rhythm: Normal rate and regular rhythm.      Heart sounds: Normal heart sounds.   Pulmonary:      Breath sounds: Normal breath sounds.   Abdominal:      General: There is distension.   Musculoskeletal:      Right lower leg: Edema present.      Left lower leg: Edema present.   Skin:     General: Skin is warm and dry.      Capillary Refill: Capillary refill  takes less than 2 seconds.   Neurological:      Mental Status: He is oriented to person, place, and time.             Significant Labs: All pertinent labs within the past 24 hours have been reviewed.    Significant Imaging: I have reviewed all pertinent imaging results/findings within the past 24 hours.  I have reviewed and interpreted all pertinent imaging results/findings within the past 24 hours.
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
## 2023-08-29 LAB
ABO + RH BLD: NORMAL
ALBUMIN SERPL BCP-MCNC: 1.7 G/DL (ref 3.5–5)
ALBUMIN/GLOB SERPL: 0.4 {RATIO}
ALP SERPL-CCNC: 110 U/L (ref 45–115)
ALT SERPL W P-5'-P-CCNC: 30 U/L (ref 16–61)
AMMONIA PLAS-SCNC: 55 ΜMOL/L (ref 11–32)
ANION GAP SERPL CALCULATED.3IONS-SCNC: 6 MMOL/L (ref 7–16)
AST SERPL W P-5'-P-CCNC: 58 U/L (ref 15–37)
BASOPHILS # BLD AUTO: 0.11 K/UL (ref 0–0.2)
BASOPHILS NFR BLD AUTO: 1 % (ref 0–1)
BILIRUB SERPL-MCNC: 6 MG/DL (ref ?–1.2)
BLD PROD TYP BPU: NORMAL
BLOOD UNIT EXPIRATION DATE: NORMAL
BLOOD UNIT TYPE CODE: 5100
BUN SERPL-MCNC: 17 MG/DL (ref 7–18)
BUN/CREAT SERPL: 12 (ref 6–20)
CALCIUM SERPL-MCNC: 7.8 MG/DL (ref 8.5–10.1)
CHLORIDE SERPL-SCNC: 99 MMOL/L (ref 98–107)
CO2 SERPL-SCNC: 32 MMOL/L (ref 21–32)
CREAT SERPL-MCNC: 1.39 MG/DL (ref 0.7–1.3)
CROSSMATCH INTERPRETATION: NORMAL
DIFFERENTIAL METHOD BLD: ABNORMAL
DISPENSE STATUS: NORMAL
EGFR (NO RACE VARIABLE) (RUSH/TITUS): 62 ML/MIN/1.73M2
EOSINOPHIL # BLD AUTO: 0.68 K/UL (ref 0–0.5)
EOSINOPHIL NFR BLD AUTO: 6.1 % (ref 1–4)
ERYTHROCYTE [DISTWIDTH] IN BLOOD BY AUTOMATED COUNT: 20.1 % (ref 11.5–14.5)
GLOBULIN SER-MCNC: 4.2 G/DL (ref 2–4)
GLUCOSE SERPL-MCNC: 87 MG/DL (ref 74–106)
HCT VFR BLD AUTO: 20.6 % (ref 40–54)
HCT VFR BLD AUTO: 21.8 % (ref 40–54)
HGB BLD-MCNC: 6.8 G/DL (ref 13.5–18)
HGB BLD-MCNC: 7.2 G/DL (ref 13.5–18)
IMM GRANULOCYTES # BLD AUTO: 0.09 K/UL (ref 0–0.04)
IMM GRANULOCYTES NFR BLD: 0.8 % (ref 0–0.4)
INR BLD: 2.02
LYMPHOCYTES # BLD AUTO: 1.62 K/UL (ref 1–4.8)
LYMPHOCYTES NFR BLD AUTO: 14.5 % (ref 27–41)
MCH RBC QN AUTO: 29.5 PG (ref 27–31)
MCHC RBC AUTO-ENTMCNC: 33 G/DL (ref 32–36)
MCV RBC AUTO: 89.3 FL (ref 80–96)
MONOCYTES # BLD AUTO: 2.44 K/UL (ref 0–0.8)
MONOCYTES NFR BLD AUTO: 21.8 % (ref 2–6)
MPC BLD CALC-MCNC: 11.5 FL (ref 9.4–12.4)
NEUTROPHILS # BLD AUTO: 6.24 K/UL (ref 1.8–7.7)
NEUTROPHILS NFR BLD AUTO: 55.8 % (ref 53–65)
NRBC # BLD AUTO: 0 X10E3/UL
NRBC, AUTO (.00): 0 %
PLATELET # BLD AUTO: 82 K/UL (ref 150–400)
POTASSIUM SERPL-SCNC: 3.4 MMOL/L (ref 3.5–5.1)
PROT SERPL-MCNC: 5.9 G/DL (ref 6.4–8.2)
PROTHROMBIN TIME: 22.5 SECONDS (ref 11.7–14.7)
RBC # BLD AUTO: 2.44 M/UL (ref 4.6–6.2)
SODIUM SERPL-SCNC: 134 MMOL/L (ref 136–145)
UNIT NUMBER: NORMAL
WBC # BLD AUTO: 11.18 K/UL (ref 4.5–11)

## 2023-08-29 PROCEDURE — 85610 PROTHROMBIN TIME: CPT | Performed by: STUDENT IN AN ORGANIZED HEALTH CARE EDUCATION/TRAINING PROGRAM

## 2023-08-29 PROCEDURE — 25000003 PHARM REV CODE 250

## 2023-08-29 PROCEDURE — 99233 PR SUBSEQUENT HOSPITAL CARE,LEVL III: ICD-10-PCS | Mod: GC,,, | Performed by: FAMILY MEDICINE

## 2023-08-29 PROCEDURE — P9016 RBC LEUKOCYTES REDUCED: HCPCS

## 2023-08-29 PROCEDURE — C9113 INJ PANTOPRAZOLE SODIUM, VIA: HCPCS | Performed by: INTERNAL MEDICINE

## 2023-08-29 PROCEDURE — 63600175 PHARM REV CODE 636 W HCPCS

## 2023-08-29 PROCEDURE — 85025 COMPLETE CBC W/AUTO DIFF WBC: CPT | Performed by: STUDENT IN AN ORGANIZED HEALTH CARE EDUCATION/TRAINING PROGRAM

## 2023-08-29 PROCEDURE — 25000003 PHARM REV CODE 250: Performed by: STUDENT IN AN ORGANIZED HEALTH CARE EDUCATION/TRAINING PROGRAM

## 2023-08-29 PROCEDURE — 99233 SBSQ HOSP IP/OBS HIGH 50: CPT | Mod: GC,,, | Performed by: FAMILY MEDICINE

## 2023-08-29 PROCEDURE — 85014 HEMATOCRIT: CPT

## 2023-08-29 PROCEDURE — 63600175 PHARM REV CODE 636 W HCPCS: Performed by: STUDENT IN AN ORGANIZED HEALTH CARE EDUCATION/TRAINING PROGRAM

## 2023-08-29 PROCEDURE — 11000001 HC ACUTE MED/SURG PRIVATE ROOM

## 2023-08-29 PROCEDURE — 82140 ASSAY OF AMMONIA: CPT

## 2023-08-29 PROCEDURE — 63600175 PHARM REV CODE 636 W HCPCS: Performed by: INTERNAL MEDICINE

## 2023-08-29 PROCEDURE — 80053 COMPREHEN METABOLIC PANEL: CPT | Performed by: STUDENT IN AN ORGANIZED HEALTH CARE EDUCATION/TRAINING PROGRAM

## 2023-08-29 RX ORDER — HYDROCODONE BITARTRATE AND ACETAMINOPHEN 500; 5 MG/1; MG/1
TABLET ORAL
Status: DISCONTINUED | OUTPATIENT
Start: 2023-08-29 | End: 2023-09-02

## 2023-08-29 RX ORDER — POTASSIUM CHLORIDE 20 MEQ/1
40 TABLET, EXTENDED RELEASE ORAL 2 TIMES DAILY
Status: COMPLETED | OUTPATIENT
Start: 2023-08-29 | End: 2023-08-29

## 2023-08-29 RX ADMIN — SODIUM CHLORIDE: 9 INJECTION, SOLUTION INTRAVENOUS at 10:08

## 2023-08-29 RX ADMIN — RIFAXIMIN 550 MG: 550 TABLET ORAL at 11:08

## 2023-08-29 RX ADMIN — LACTULOSE 10 G: 20 SOLUTION ORAL at 11:08

## 2023-08-29 RX ADMIN — PANTOPRAZOLE SODIUM 40 MG: 40 INJECTION, POWDER, FOR SOLUTION INTRAVENOUS at 11:08

## 2023-08-29 RX ADMIN — OXYCODONE AND ACETAMINOPHEN 1 TABLET: 10; 325 TABLET ORAL at 02:08

## 2023-08-29 RX ADMIN — RIFAXIMIN 550 MG: 550 TABLET ORAL at 09:08

## 2023-08-29 RX ADMIN — OXYCODONE AND ACETAMINOPHEN 1 TABLET: 10; 325 TABLET ORAL at 12:08

## 2023-08-29 RX ADMIN — POTASSIUM CHLORIDE 40 MEQ: 1500 TABLET, EXTENDED RELEASE ORAL at 09:08

## 2023-08-29 RX ADMIN — SPIRONOLACTONE 25 MG: 25 TABLET ORAL at 11:08

## 2023-08-29 RX ADMIN — FUROSEMIDE 20 MG: 10 INJECTION, SOLUTION INTRAVENOUS at 09:08

## 2023-08-29 RX ADMIN — PHYTONADIONE 5 MG: 10 INJECTION, EMULSION INTRAMUSCULAR; INTRAVENOUS; SUBCUTANEOUS at 06:08

## 2023-08-29 RX ADMIN — THIAMINE HYDROCHLORIDE 500 MG: 100 INJECTION, SOLUTION INTRAMUSCULAR; INTRAVENOUS at 11:08

## 2023-08-29 RX ADMIN — POTASSIUM CHLORIDE 40 MEQ: 1500 TABLET, EXTENDED RELEASE ORAL at 11:08

## 2023-08-29 RX ADMIN — FUROSEMIDE 20 MG: 10 INJECTION, SOLUTION INTRAVENOUS at 11:08

## 2023-08-29 RX ADMIN — FOLIC ACID 1 MG: 1 TABLET ORAL at 11:08

## 2023-08-29 RX ADMIN — THIAMINE HYDROCHLORIDE 500 MG: 100 INJECTION, SOLUTION INTRAMUSCULAR; INTRAVENOUS at 02:08

## 2023-08-29 RX ADMIN — LACTULOSE 10 G: 20 SOLUTION ORAL at 09:08

## 2023-08-29 RX ADMIN — PROPRANOLOL HYDROCHLORIDE 10 MG: 10 TABLET ORAL at 11:08

## 2023-08-29 RX ADMIN — THIAMINE HYDROCHLORIDE 500 MG: 100 INJECTION, SOLUTION INTRAMUSCULAR; INTRAVENOUS at 09:08

## 2023-08-29 NOTE — ASSESSMENT & PLAN NOTE
MELD score 22 points. 19.6% with estimated 3 month mortality  CIWA score 0    MRCP reveals cirrhosis and multiple gallstones      8/27: Pt is noted to have scleral icterus, bili 5.5, and continued hematochezia.   Put in for transfer (PFC order) for higher level of care. At 13:25, [facility] has no beds available, per Kaylee Perales RN. At 13:56, [facility] per Dr. Riggs has no available beds & is on diversion.  Call [facility] on 8/28 to see if any beds are available [phone]

## 2023-08-29 NOTE — PROGRESS NOTES
[facility] Hospital Medicine  Progress Note    Patient Name: John Spears  MRN: [id number]  Patient Class: IP- Inpatient   Admission Date: 8/19/2023  Length of Stay: 9 days  Attending Physician: Katherine Horton DO  Primary Care Provider: Romel Miranda        Subjective:     Principal Problem:Bleeding hemorrhoid        HPI:  Patient is a 47 y/o male with PMH of HTN, HLD, cirrhosis of the liver and hemorrhoids who presents to the ED with complaint of COB and swelling of the abdomen, legs and feet that started about 1 month ago. Patient states that the swelling and SOB has been worsening for the last 2 weeks. Patient reports passing dark blood per rectum with bowel movement for the last 2 weeks. Patient reports having intermittent RUQ when he eats fried foot. Patient reports chills but no fever. Patient denies nausea, vomiting, chest pain or urinary changes. Patient reports drinking about 10 beers of 16 ounces per day for the 3 years and for the last months he stopped drinking beer and started drinking  liquor. He states that 1 fifth of liquor lasts for 3 days. Patient reports withdrawal symptoms in the past. Patient reports that he quit smoking cigarettes 5 years ago.      In the ED showed /67, HR 90, RR 16, SpO2 98% and afebrile. Blood work showed H/H 6.1/19, WBC 5.6, PLT 78, PT 21.2, INR 1.87, PTT 48.2. Sodium 134, potassium 2.8, Alkaline phosphatase 166, Total bilirrubin 6.5, AST/ALST 61/41, p-, troponin 16.7. UA negative for infection or blood. FOBT negative. EKG nsr with HR 89. Patient is receiving PRBC in the ER at this moment and electrolytes are being replenished. Patient will be admitted to the hospital for further management.       Overview/Hospital Course:  No notes on file    Interval History:     Patient was examined at bed side with his cousin at the bedside. His condition remains unchanged. His cousin mentioned that he had blood per rectum  this morning. We will continue to monitor H&H. Plan to transfer to higher level of care but  there was no bed at [facility] and [facility] last weekend. We contact [facility] and [facility] to see if the situation changes.Otherwise, he denies any acute overnight and was able to sit at the bedside during round today.         Review of Systems   Constitutional:  Positive for activity change. Negative for fatigue.   Respiratory:  Negative for chest tightness and shortness of breath.    Cardiovascular:  Positive for leg swelling. Negative for chest pain.   Gastrointestinal:  Positive for abdominal distention and anal bleeding.   Skin:  Negative for rash and wound.   Neurological:  Negative for tremors.     Objective:     Vital Signs (Most Recent):  Temp: 98.1 °F (36.7 °C) (08/29/23 1500)  Pulse: 75 (08/29/23 1500)  Resp: 14 (08/29/23 1500)  BP: (!) 116/51 (08/29/23 1500)  SpO2: 96 % (08/29/23 1500) Vital Signs (24h Range):  Temp:  [97.4 °F (36.3 °C)-98.1 °F (36.7 °C)] 98.1 °F (36.7 °C)  Pulse:  [73-83] 75  Resp:  [12-18] 14  SpO2:  [96 %-100 %] 96 %  BP: (112-128)/(49-77) 116/51     Weight: (!) 149.7 kg (330 lb 0.5 oz)  Body mass index is 54.92 kg/m².    Intake/Output Summary (Last 24 hours) at 8/29/2023 1658  Last data filed at 8/28/2023 1740  Gross per 24 hour   Intake 720 ml   Output 550 ml   Net 170 ml         Physical Exam  Vitals and nursing note reviewed.   Constitutional:       Appearance: He is obese. He is ill-appearing.      Comments: Pt is somnolent but aroused easily   HENT:      Head: Normocephalic.      Right Ear: External ear normal.   Eyes:      General: Scleral icterus present.   Cardiovascular:      Rate and Rhythm: Normal rate and regular rhythm.      Heart sounds: Normal heart sounds.   Pulmonary:      Breath sounds: Normal breath sounds.   Abdominal:      General: There is distension.   Musculoskeletal:      Right lower leg: Edema present.      Left lower leg: Edema present.   Skin:     General: Skin is warm and dry.       Capillary Refill: Capillary refill takes less than 2 seconds.   Neurological:      Mental Status: He is oriented to person, place, and time.             Significant Labs: All pertinent labs within the past 24 hours have been reviewed.    Significant Imaging: I have reviewed all pertinent imaging results/findings within the past 24 hours.  I have reviewed and interpreted all pertinent imaging results/findings within the past 24 hours.      Assessment/Plan:      * Bleeding hemorrhoid  Likely secondary to liver cirrhosis    Per Dr. Gusman:  Active arterial bleeding on endoscopy in anal canal; too friable for clip. (In OR) Arterial bleeding at the 9 o'clock position in the anal canal; multiple bleeding hemorrhoidal plexus at 6 o'clock position on the posterior canal and at 3 o'clock position    Patient received 2 units of packed red blood cells in the OR and we will give FFP    8/25: No bleeding overnight  8/26/23 no obvious bleeding on rectal dressing    8/27: Rectal bleeding noted this morning. Rectal dressing/pad saturated with blod and clots. 1U PRBC ordered.     Hyperammonemia  8/27: Continue with rifaxmin  Started on lactulose on 8/26  Ammonia level 52      Acute lower GI bleeding    Patient received 2 units of packed red blood cells in the OR and we will give FFP  8/25: Hgb 7.8  8/26/23 no obvious rectal bleeding    Hypertension    Monitor BP    Alcoholic cirrhosis of liver  MELD score 22 points. 19.6% with estimated 3 month mortality  CIWA score 0    MRCP reveals cirrhosis and multiple gallstones      8/27: Pt is noted to have scleral icterus, bili 5.5, and continued hematochezia.   Put in for transfer (PFC order) for higher level of care. At 13:25, [facility] has no beds available, per Kaylee Perales RN. At 13:56, [facility] per Dr. Riggs has no available beds & is on diversion.  Call [facility] on 8/28 to see if any beds are available [phone]         Anemia  Acute blood loss     Per significant other, had hematochezia  3x this morning. Noted pad saturated with blood and clots.  Hgb 7.2. 1U PRBC noted. Dr. Mahajan informed.   Monitor CBC      Hypoalbuminemia  Likely from malturition + or - synthetic function of the liver  C/w Spironolactone and lasix    VTE Risk Mitigation (From admission, onward)         Ordered     Reason for No Pharmacological VTE Prophylaxis  Once        Question:  Reasons:  Answer:  Active Bleeding    08/20/23 0043     IP VTE HIGH RISK PATIENT  Once         08/20/23 0043     Place sequential compression device  Until discontinued         08/20/23 0043                Discharge Planning   LUCRECIA:      Code Status: Full Code   Is the patient medically ready for discharge?:     Reason for patient still in hospital (select all that apply): Treatment  Discharge Plan A: Home with family                  Luther Carvalho MD  Department of Hospital Medicine   [facility]

## 2023-08-29 NOTE — SUBJECTIVE & OBJECTIVE
Interval History:     Patient was examined at bed side with his cousin at the bedside. His condition remains unchanged. His cousin mentioned that he had blood per rectum this morning. We will continue to monitor H&H. Plan to transfer to higher level of care but  there was no bed at [facility] last weekend. We contact [facility] and [facility] to see if the situation changes.Otherwise, he denies any acute overnight and was able to sit at the bedside during round today.         Review of Systems   Constitutional:  Positive for activity change. Negative for fatigue.   Respiratory:  Negative for chest tightness and shortness of breath.    Cardiovascular:  Positive for leg swelling. Negative for chest pain.   Gastrointestinal:  Positive for abdominal distention and anal bleeding.   Skin:  Negative for rash and wound.   Neurological:  Negative for tremors.     Objective:     Vital Signs (Most Recent):  Temp: 98.1 °F (36.7 °C) (08/29/23 1500)  Pulse: 75 (08/29/23 1500)  Resp: 14 (08/29/23 1500)  BP: (!) 116/51 (08/29/23 1500)  SpO2: 96 % (08/29/23 1500) Vital Signs (24h Range):  Temp:  [97.4 °F (36.3 °C)-98.1 °F (36.7 °C)] 98.1 °F (36.7 °C)  Pulse:  [73-83] 75  Resp:  [12-18] 14  SpO2:  [96 %-100 %] 96 %  BP: (112-128)/(49-77) 116/51     Weight: (!) 149.7 kg (330 lb 0.5 oz)  Body mass index is 54.92 kg/m².    Intake/Output Summary (Last 24 hours) at 8/29/2023 1658  Last data filed at 8/28/2023 1740  Gross per 24 hour   Intake 720 ml   Output 550 ml   Net 170 ml         Physical Exam  Vitals and nursing note reviewed.   Constitutional:       Appearance: He is obese. He is ill-appearing.      Comments: Pt is somnolent but aroused easily   HENT:      Head: Normocephalic.      Right Ear: External ear normal.   Eyes:      General: Scleral icterus present.   Cardiovascular:      Rate and Rhythm: Normal rate and regular rhythm.      Heart sounds: Normal heart sounds.   Pulmonary:      Breath sounds: Normal breath sounds.   Abdominal:       General: There is distension.   Musculoskeletal:      Right lower leg: Edema present.      Left lower leg: Edema present.   Skin:     General: Skin is warm and dry.      Capillary Refill: Capillary refill takes less than 2 seconds.   Neurological:      Mental Status: He is oriented to person, place, and time.             Significant Labs: All pertinent labs within the past 24 hours have been reviewed.    Significant Imaging: I have reviewed all pertinent imaging results/findings within the past 24 hours.  I have reviewed and interpreted all pertinent imaging results/findings within the past 24 hours.

## 2023-08-30 LAB
ABO + RH BLD: NORMAL
ALBUMIN SERPL BCP-MCNC: 1.4 G/DL (ref 3.5–5)
ALBUMIN/GLOB SERPL: 0.4 {RATIO}
ALP SERPL-CCNC: 120 U/L (ref 45–115)
ALT SERPL W P-5'-P-CCNC: 30 U/L (ref 16–61)
ANION GAP SERPL CALCULATED.3IONS-SCNC: 10 MMOL/L (ref 7–16)
ANISOCYTOSIS BLD QL SMEAR: ABNORMAL
AST SERPL W P-5'-P-CCNC: 54 U/L (ref 15–37)
BASOPHILS NFR BLD MANUAL: 1 % (ref 0–1)
BILIRUB SERPL-MCNC: 4.5 MG/DL (ref ?–1.2)
BLD PROD TYP BPU: NORMAL
BLOOD UNIT EXPIRATION DATE: NORMAL
BLOOD UNIT TYPE CODE: 5100
BUN SERPL-MCNC: 21 MG/DL (ref 7–18)
BUN/CREAT SERPL: 13 (ref 6–20)
CALCIUM SERPL-MCNC: 7.3 MG/DL (ref 8.5–10.1)
CHLORIDE SERPL-SCNC: 102 MMOL/L (ref 98–107)
CO2 SERPL-SCNC: 27 MMOL/L (ref 21–32)
CREAT SERPL-MCNC: 1.63 MG/DL (ref 0.7–1.3)
CROSSMATCH INTERPRETATION: NORMAL
DIFFERENTIAL METHOD BLD: ABNORMAL
DISPENSE STATUS: NORMAL
EGFR (NO RACE VARIABLE) (RUSH/TITUS): 51 ML/MIN/1.73M2
EOSINOPHIL NFR BLD MANUAL: 6 % (ref 1–4)
ERYTHROCYTE [DISTWIDTH] IN BLOOD BY AUTOMATED COUNT: 19.4 % (ref 11.5–14.5)
GLOBULIN SER-MCNC: 3.5 G/DL (ref 2–4)
GLUCOSE SERPL-MCNC: 99 MG/DL (ref 74–106)
HCT VFR BLD AUTO: 18 % (ref 40–54)
HCT VFR BLD AUTO: 18 % (ref 40–54)
HCT VFR BLD AUTO: 21.4 % (ref 40–54)
HCT VFR BLD AUTO: 21.6 % (ref 40–54)
HGB BLD-MCNC: 6.1 G/DL (ref 13.5–18)
HGB BLD-MCNC: 6.1 G/DL (ref 13.5–18)
HGB BLD-MCNC: 7.2 G/DL (ref 13.5–18)
HGB BLD-MCNC: 7.2 G/DL (ref 13.5–18)
INDIRECT COOMBS: NORMAL
INR BLD: 2.41
LYMPHOCYTES NFR BLD MANUAL: 20 % (ref 27–41)
MCH RBC QN AUTO: 29.8 PG (ref 27–31)
MCHC RBC AUTO-ENTMCNC: 33.9 G/DL (ref 32–36)
MCV RBC AUTO: 87.8 FL (ref 80–96)
MONOCYTES NFR BLD MANUAL: 6 % (ref 2–6)
MPC BLD CALC-MCNC: 11.4 FL (ref 9.4–12.4)
NEUTS BAND NFR BLD MANUAL: 1 % (ref 1–5)
NEUTS SEG NFR BLD MANUAL: 66 % (ref 50–62)
OVALOCYTES BLD QL SMEAR: ABNORMAL
PLATELET # BLD AUTO: 86 K/UL (ref 150–400)
PLATELET MORPHOLOGY: ABNORMAL
POLYCHROMASIA BLD QL SMEAR: ABNORMAL
POTASSIUM SERPL-SCNC: 4.1 MMOL/L (ref 3.5–5.1)
PROT SERPL-MCNC: 4.9 G/DL (ref 6.4–8.2)
PROTHROMBIN TIME: 25.8 SECONDS (ref 11.7–14.7)
RBC # BLD AUTO: 2.05 M/UL (ref 4.6–6.2)
RH BLD: NORMAL
SODIUM SERPL-SCNC: 135 MMOL/L (ref 136–145)
SPECIMEN OUTDATE: NORMAL
UNIT NUMBER: NORMAL
WBC # BLD AUTO: 15.5 K/UL (ref 4.5–11)

## 2023-08-30 PROCEDURE — 99233 PR SUBSEQUENT HOSPITAL CARE,LEVL III: ICD-10-PCS | Mod: ,,, | Performed by: FAMILY MEDICINE

## 2023-08-30 PROCEDURE — 86850 RBC ANTIBODY SCREEN: CPT

## 2023-08-30 PROCEDURE — 85610 PROTHROMBIN TIME: CPT | Performed by: STUDENT IN AN ORGANIZED HEALTH CARE EDUCATION/TRAINING PROGRAM

## 2023-08-30 PROCEDURE — P9017 PLASMA 1 DONOR FRZ W/IN 8 HR: HCPCS

## 2023-08-30 PROCEDURE — 80053 COMPREHEN METABOLIC PANEL: CPT | Performed by: STUDENT IN AN ORGANIZED HEALTH CARE EDUCATION/TRAINING PROGRAM

## 2023-08-30 PROCEDURE — 63600175 PHARM REV CODE 636 W HCPCS: Performed by: STUDENT IN AN ORGANIZED HEALTH CARE EDUCATION/TRAINING PROGRAM

## 2023-08-30 PROCEDURE — 25000003 PHARM REV CODE 250: Performed by: STUDENT IN AN ORGANIZED HEALTH CARE EDUCATION/TRAINING PROGRAM

## 2023-08-30 PROCEDURE — 63600175 PHARM REV CODE 636 W HCPCS: Performed by: INTERNAL MEDICINE

## 2023-08-30 PROCEDURE — 25000003 PHARM REV CODE 250

## 2023-08-30 PROCEDURE — 99233 SBSQ HOSP IP/OBS HIGH 50: CPT | Mod: ,,, | Performed by: FAMILY MEDICINE

## 2023-08-30 PROCEDURE — 85025 COMPLETE CBC W/AUTO DIFF WBC: CPT | Performed by: STUDENT IN AN ORGANIZED HEALTH CARE EDUCATION/TRAINING PROGRAM

## 2023-08-30 PROCEDURE — 86923 COMPATIBILITY TEST ELECTRIC: CPT | Mod: 91

## 2023-08-30 PROCEDURE — P9016 RBC LEUKOCYTES REDUCED: HCPCS

## 2023-08-30 PROCEDURE — 11000001 HC ACUTE MED/SURG PRIVATE ROOM

## 2023-08-30 PROCEDURE — 86923 COMPATIBILITY TEST ELECTRIC: CPT

## 2023-08-30 PROCEDURE — C9113 INJ PANTOPRAZOLE SODIUM, VIA: HCPCS | Performed by: INTERNAL MEDICINE

## 2023-08-30 PROCEDURE — 63600175 PHARM REV CODE 636 W HCPCS

## 2023-08-30 PROCEDURE — 85014 HEMATOCRIT: CPT | Performed by: FAMILY MEDICINE

## 2023-08-30 PROCEDURE — 85014 HEMATOCRIT: CPT

## 2023-08-30 RX ADMIN — RIFAXIMIN 550 MG: 550 TABLET ORAL at 09:08

## 2023-08-30 RX ADMIN — SPIRONOLACTONE 25 MG: 25 TABLET ORAL at 09:08

## 2023-08-30 RX ADMIN — SODIUM CHLORIDE: 9 INJECTION, SOLUTION INTRAVENOUS at 06:08

## 2023-08-30 RX ADMIN — THIAMINE HYDROCHLORIDE 500 MG: 100 INJECTION, SOLUTION INTRAMUSCULAR; INTRAVENOUS at 09:08

## 2023-08-30 RX ADMIN — FOLIC ACID 1 MG: 1 TABLET ORAL at 09:08

## 2023-08-30 RX ADMIN — FUROSEMIDE 20 MG: 10 INJECTION, SOLUTION INTRAVENOUS at 09:08

## 2023-08-30 RX ADMIN — PROPRANOLOL HYDROCHLORIDE 10 MG: 10 TABLET ORAL at 09:08

## 2023-08-30 RX ADMIN — LACTULOSE 10 G: 20 SOLUTION ORAL at 09:08

## 2023-08-30 RX ADMIN — OXYCODONE AND ACETAMINOPHEN 1 TABLET: 10; 325 TABLET ORAL at 10:08

## 2023-08-30 RX ADMIN — PANTOPRAZOLE SODIUM 40 MG: 40 INJECTION, POWDER, FOR SOLUTION INTRAVENOUS at 09:08

## 2023-08-30 RX ADMIN — THIAMINE HYDROCHLORIDE 500 MG: 100 INJECTION, SOLUTION INTRAMUSCULAR; INTRAVENOUS at 03:08

## 2023-08-30 NOTE — ASSESSMENT & PLAN NOTE
MELD score 22 points. 19.6% with estimated 3 month mortality  CIWA score 0    MRCP reveals cirrhosis and multiple gallstones      8/27: Pt is noted to have scleral icterus, bili 5.5, and continued hematochezia.   Put in for transfer ([facility] order) for higher level of care. At 13:25, [facility] has no beds available, per Kaylee Perales RN. At 13:56, [facility] per Dr. Riggs has no available beds & is on diversion.  Call [facility] on 8/28 to see if any beds are available [phone]   8/30: pending transfer from [facility].

## 2023-08-30 NOTE — ASSESSMENT & PLAN NOTE
Acute blood loss     Per significant other, had hematochezia 3x this morning. Noted pad saturated with blood and clots.  Hgb 7.2. 1U PRBC noted. Dr. Mahajan informed.   Monitor CBC  8/30: Hgb in morning was 6.1, one unit of Rbc ordered on noon check it was 7.2 will get H/H q 6hr, will continue to monitor and transfuse appropriately, will hopefully transfer soon.

## 2023-08-30 NOTE — PLAN OF CARE
Problem: Adult Inpatient Plan of Care  Goal: Plan of Care Review  Outcome: Ongoing, Progressing  Flowsheets (Taken 8/30/2023 1646)  Plan of Care Reviewed With:   patient   family  Goal: Patient-Specific Goal (Individualized)  Outcome: Ongoing, Progressing  Flowsheets (Taken 8/30/2023 1646)  Anxieties, Fears or Concerns: bleeding  Individualized Care Needs: pain management, bleeding, skin breakdown  Goal: Absence of Hospital-Acquired Illness or Injury  Outcome: Ongoing, Progressing  Intervention: Identify and Manage Fall Risk  Flowsheets (Taken 8/30/2023 1646)  Safety Promotion/Fall Prevention:   assistive device/personal item within reach   diversional activities provided   commode/urinal/bedpan at bedside   lighting adjusted   medications reviewed   nonskid shoes/socks when out of bed   side rails raised x 2  Intervention: Prevent Skin Injury  Flowsheets (Taken 8/30/2023 1646)  Body Position: position changed independently  Skin Protection:   adhesive use limited   incontinence pads utilized  Intervention: Prevent and Manage VTE (Venous Thromboembolism) Risk  Flowsheets (Taken 8/30/2023 1646)  Activity Management:   Standing - L3   Arm raise - L1   Rolling - L1  VTE Prevention/Management:   remove, assess skin, and reapply sequential compression device   bleeding precations maintained  Range of Motion: ROM (range of motion) performed  Intervention: Prevent Infection  Flowsheets (Taken 8/30/2023 1646)  Infection Prevention:   equipment surfaces disinfected   hand hygiene promoted   personal protective equipment utilized   rest/sleep promoted   single patient room provided  Goal: Optimal Comfort and Wellbeing  Outcome: Ongoing, Progressing  Intervention: Monitor Pain and Promote Comfort  Flowsheets (Taken 8/30/2023 1646)  Pain Management Interventions: medication offered  Intervention: Provide Person-Centered Care  Flowsheets (Taken 8/30/2023 1646)  Trust Relationship/Rapport:   care explained   choices provided    questions answered   questions encouraged   thoughts/feelings acknowledged   reassurance provided  Goal: Readiness for Transition of Care  Outcome: Ongoing, Progressing     Problem: Bariatric Environmental Safety  Goal: Safety Maintained with Care  Outcome: Ongoing, Progressing  Intervention: Promote Safety and Comfort  Flowsheets (Taken 8/30/2023 1646)  Bariatric Safety: specialty bed utilized     Problem: Fall Injury Risk  Goal: Absence of Fall and Fall-Related Injury  Outcome: Ongoing, Progressing  Intervention: Identify and Manage Contributors  Flowsheets (Taken 8/30/2023 1646)  Self-Care Promotion:   BADL personal objects within reach   independence encouraged  Medication Review/Management: medications reviewed  Intervention: Promote Injury-Free Environment  Flowsheets (Taken 8/30/2023 1646)  Safety Promotion/Fall Prevention:   assistive device/personal item within reach   diversional activities provided   commode/urinal/bedpan at bedside   lighting adjusted   medications reviewed   nonskid shoes/socks when out of bed   side rails raised x 2     Problem: Skin Injury Risk Increased  Goal: Skin Health and Integrity  Outcome: Ongoing, Progressing  Intervention: Optimize Skin Protection  Flowsheets (Taken 8/30/2023 1646)  Pressure Reduction Techniques: frequent weight shift encouraged  Pressure Reduction Devices:   pressure-redistributing mattress utilized   positioning supports utilized   foam padding utilized  Skin Protection:   adhesive use limited   incontinence pads utilized  Head of Bed (HOB) Positioning: HOB at 30 degrees  Intervention: Promote and Optimize Oral Intake  Flowsheets (Taken 8/30/2023 1646)  Oral Nutrition Promotion:   calorie-dense foods provided   calorie-dense liquids provided

## 2023-08-30 NOTE — PROGRESS NOTES
[facility] Tele[facility] Hospital Medicine  Progress Note    Patient Name: John Spears  MRN: [id number]  Patient Class: IP- Inpatient   Admission Date: 8/19/2023  Length of Stay: 10 days  Attending Physician: Katherine Horton DO  Primary Care Provider: Romel Miranda        Subjective:     Principal Problem:Bleeding hemorrhoid        HPI:  Patient is a 49 y/o male with PMH of HTN, HLD, cirrhosis of the liver and hemorrhoids who presents to the ED with complaint of COB and swelling of the abdomen, legs and feet that started about 1 month ago. Patient states that the swelling and SOB has been worsening for the last 2 weeks. Patient reports passing dark blood per rectum with bowel movement for the last 2 weeks. Patient reports having intermittent RUQ when he eats fried foot. Patient reports chills but no fever. Patient denies nausea, vomiting, chest pain or urinary changes. Patient reports drinking about 10 beers of 16 ounces per day for the 3 years and for the last months he stopped drinking beer and started drinking  liquor. He states that 1 fifth of liquor lasts for 3 days. Patient reports withdrawal symptoms in the past. Patient reports that he quit smoking cigarettes 5 years ago.      In the ED showed /67, HR 90, RR 16, SpO2 98% and afebrile. Blood work showed H/H 6.1/19, WBC 5.6, PLT 78, PT 21.2, INR 1.87, PTT 48.2. Sodium 134, potassium 2.8, Alkaline phosphatase 166, Total bilirrubin 6.5, AST/ALST 61/41, p-, troponin 16.7. UA negative for infection or blood. FOBT negative. EKG nsr with HR 89. Patient is receiving PRBC in the ER at this moment and electrolytes are being replenished. Patient will be admitted to the hospital for further management.       Overview/Hospital Course:  No notes on file    Interval History: Patient had increased bloody bowel movements overnight. Patient requires one unit of RBCs this morning will get H/H q 6hrs. [facility] still in the process of  finding placement for patient.     Review of Systems   Constitutional:  Positive for activity change. Negative for fatigue.   Respiratory:  Negative for chest tightness and shortness of breath.    Cardiovascular:  Positive for leg swelling. Negative for chest pain.   Gastrointestinal:  Positive for abdominal distention and anal bleeding.   Skin:  Negative for rash and wound.   Neurological:  Negative for tremors.     Objective:     Vital Signs (Most Recent):  Temp: 97.6 °F (36.4 °C) (08/30/23 1100)  Pulse: 75 (08/30/23 1100)  Resp: 18 (08/30/23 1100)  BP: 133/84 (08/30/23 1100)  SpO2: 100 % (08/30/23 1100) Vital Signs (24h Range):  Temp:  [97.6 °F (36.4 °C)-98.1 °F (36.7 °C)] 97.6 °F (36.4 °C)  Pulse:  [75-87] 75  Resp:  [14-18] 18  SpO2:  [94 %-100 %] 100 %  BP: (112-138)/(51-84) 133/84     Weight: (!) 149.7 kg (330 lb 0.5 oz)  Body mass index is 54.92 kg/m².    Intake/Output Summary (Last 24 hours) at 8/30/2023 1504  Last data filed at 8/30/2023 0957  Gross per 24 hour   Intake 468.42 ml   Output --   Net 468.42 ml         Physical Exam  Vitals and nursing note reviewed.   Constitutional:       Appearance: He is obese. He is ill-appearing.      Comments: Pt is somnolent but aroused easily   HENT:      Head: Normocephalic.      Right Ear: External ear normal.      Left Ear: External ear normal.      Nose: Nose normal.      Mouth/Throat:      Mouth: Mucous membranes are moist.      Pharynx: Oropharynx is clear.   Eyes:      General: Scleral icterus present.   Cardiovascular:      Rate and Rhythm: Normal rate and regular rhythm.      Heart sounds: Normal heart sounds.   Pulmonary:      Breath sounds: Normal breath sounds.   Abdominal:      General: There is distension.   Musculoskeletal:      Right lower leg: Edema present.      Left lower leg: Edema present.   Skin:     General: Skin is warm and dry.      Capillary Refill: Capillary refill takes less than 2 seconds.   Neurological:      Mental Status: He is oriented  to person, place, and time.             Significant Labs: All pertinent labs within the past 24 hours have been reviewed.    Significant Imaging: I have reviewed all pertinent imaging results/findings within the past 24 hours.      Assessment/Plan:      * Bleeding hemorrhoid  Likely secondary to liver cirrhosis    Per Dr. Gusman:  Active arterial bleeding on endoscopy in anal canal; too friable for clip. (In OR) Arterial bleeding at the 9 o'clock position in the anal canal; multiple bleeding hemorrhoidal plexus at 6 o'clock position on the posterior canal and at 3 o'clock position    Patient received 2 units of packed red blood cells in the OR and we will give FFP    8/25: No bleeding overnight  8/26/23 no obvious bleeding on rectal dressing    8/27: Rectal bleeding noted this morning. Rectal dressing/pad saturated with blod and clots. 1U PRBC ordered.   8/30: rectal bleeding this morning one unit of RBC ordered.   8/30 addendum [facility] does not accept uninsured [facility].    Hyperammonemia  8/27: Continue with rifaxmin  Started on lactulose on 8/26  Ammonia level 52      Acute lower GI bleeding    Patient received 2 units of packed red blood cells in the OR and we will give FFP  8/25: Hgb 7.8  8/26/23 no obvious rectal bleeding  8/30: 1 unit of blood ordered.     Hypertension  Monitor BP    Alcoholic cirrhosis of liver  MELD score 22 points. 19.6% with estimated 3 month mortality  CIWA score 0    MRCP reveals cirrhosis and multiple gallstones      8/27: Pt is noted to have scleral icterus, bili 5.5, and continued hematochezia.   Put in for transfer ([facility] order) for higher level of care. At 13:25, [facility] has no beds available, per Kaylee Perales RN. At 13:56, [facility] per Dr. Riggs has no available beds & is on diversion.  Call [facility] on 8/28 to see if any beds are available [phone]   8/30: pending transfer from [facility].         Anemia  Acute blood loss     Per significant other, had hematochezia 3x this morning. Noted  pad saturated with blood and clots.  Hgb 7.2. 1U PRBC noted. Dr. Mahajan informed.   Monitor CBC  8/30: Hgb in morning was 6.1, one unit of Rbc ordered on noon check it was 7.2 will get H/H q 6hr, will continue to monitor and transfuse appropriately, will hopefully transfer soon.   AMENDMENT;Discussed case with hospitalist at [facility] turned down due to lack of insurance.Asked [facility] to continue to try [facility] and maybe consider [facility]) if they have liver service there.    Hypoalbuminemia  Likely from malturition + or - synthetic function of the liver  C/w Spironolactone and lasix      VTE Risk Mitigation (From admission, onward)           Ordered     Reason for No Pharmacological VTE Prophylaxis  Once        Question:  Reasons:  Answer:  Active Bleeding    08/20/23 0043     IP VTE HIGH RISK PATIENT  Once         08/20/23 0043     Place sequential compression device  Until discontinued         08/20/23 0043                    Discharge Planning   LUCRECIA:      Code Status: Full Code   Is the patient medically ready for discharge?:     Reason for patient still in hospital (select all that apply): Treatment  Discharge Plan A: Home with family                  Zach Morales DO  Department of Hospital Medicine   [facility] - [facility]

## 2023-08-30 NOTE — PLAN OF CARE
Problem: Adult Inpatient Plan of Care  Goal: Plan of Care Review  8/30/2023 0423 by Oma Vargas RN  Outcome: Ongoing, Progressing  8/30/2023 0422 by Oma Vargas RN  Outcome: Ongoing, Progressing  Goal: Patient-Specific Goal (Individualized)  8/30/2023 0423 by Oma Vargas RN  Outcome: Ongoing, Progressing  8/30/2023 0422 by Oma Vargas RN  Outcome: Ongoing, Progressing  Goal: Absence of Hospital-Acquired Illness or Injury  8/30/2023 0423 by Oma Vargas RN  Outcome: Ongoing, Progressing  8/30/2023 0422 by Oma Vargas RN  Outcome: Ongoing, Progressing  Goal: Optimal Comfort and Wellbeing  8/30/2023 0423 by Oma Vargas RN  Outcome: Ongoing, Progressing  8/30/2023 0422 by Oma Vargas RN  Outcome: Ongoing, Progressing  Goal: Readiness for Transition of Care  8/30/2023 0423 by Oma Vargas RN  Outcome: Ongoing, Progressing  8/30/2023 0422 by Oma Vargas RN  Outcome: Ongoing, Progressing     Problem: Bariatric Environmental Safety  Goal: Safety Maintained with Care  8/30/2023 0423 by Oma Vargas RN  Outcome: Ongoing, Progressing  8/30/2023 0422 by Oma Vargas RN  Outcome: Ongoing, Progressing     Problem: Fall Injury Risk  Goal: Absence of Fall and Fall-Related Injury  8/30/2023 0423 by Oma Vargas RN  Outcome: Ongoing, Progressing  8/30/2023 0422 by Oma Vargas RN  Outcome: Ongoing, Progressing     Problem: Skin Injury Risk Increased  Goal: Skin Health and Integrity  8/30/2023 0423 by Oma Vargas RN  Outcome: Ongoing, Progressing  8/30/2023 0422 by Oma Vargas RN  Outcome: Ongoing, Progressing

## 2023-08-30 NOTE — ASSESSMENT & PLAN NOTE
Patient received 2 units of packed red blood cells in the OR and we will give FFP  8/25: Hgb 7.8  8/26/23 no obvious rectal bleeding  8/30: 1 unit of blood ordered.

## 2023-08-30 NOTE — ASSESSMENT & PLAN NOTE
Likely secondary to liver cirrhosis    Per Dr. Gusman:  Active arterial bleeding on endoscopy in anal canal; too friable for clip. (In OR) Arterial bleeding at the 9 o'clock position in the anal canal; multiple bleeding hemorrhoidal plexus at 6 o'clock position on the posterior canal and at 3 o'clock position    Patient received 2 units of packed red blood cells in the OR and we will give FFP    8/25: No bleeding overnight  8/26/23 no obvious bleeding on rectal dressing    8/27: Rectal bleeding noted this morning. Rectal dressing/pad saturated with blod and clots. 1U PRBC ordered.   8/30: rectal bleeding this morning one unit of RBC ordered.

## 2023-08-30 NOTE — SUBJECTIVE & OBJECTIVE
Interval History: Patient had increased bloody bowel movements overnight. Patient requires one unit of RBCs this morning will get H/H q 6hrs. [facility] still in the process of finding placement for patient.     Review of Systems   Constitutional:  Positive for activity change. Negative for fatigue.   Respiratory:  Negative for chest tightness and shortness of breath.    Cardiovascular:  Positive for leg swelling. Negative for chest pain.   Gastrointestinal:  Positive for abdominal distention and anal bleeding.   Skin:  Negative for rash and wound.   Neurological:  Negative for tremors.     Objective:     Vital Signs (Most Recent):  Temp: 97.6 °F (36.4 °C) (08/30/23 1100)  Pulse: 75 (08/30/23 1100)  Resp: 18 (08/30/23 1100)  BP: 133/84 (08/30/23 1100)  SpO2: 100 % (08/30/23 1100) Vital Signs (24h Range):  Temp:  [97.6 °F (36.4 °C)-98.1 °F (36.7 °C)] 97.6 °F (36.4 °C)  Pulse:  [75-87] 75  Resp:  [14-18] 18  SpO2:  [94 %-100 %] 100 %  BP: (112-138)/(51-84) 133/84     Weight: (!) 149.7 kg (330 lb 0.5 oz)  Body mass index is 54.92 kg/m².    Intake/Output Summary (Last 24 hours) at 8/30/2023 1504  Last data filed at 8/30/2023 0957  Gross per 24 hour   Intake 468.42 ml   Output --   Net 468.42 ml         Physical Exam  Vitals and nursing note reviewed.   Constitutional:       Appearance: He is obese. He is ill-appearing.      Comments: Pt is somnolent but aroused easily   HENT:      Head: Normocephalic.      Right Ear: External ear normal.      Left Ear: External ear normal.      Nose: Nose normal.      Mouth/Throat:      Mouth: Mucous membranes are moist.      Pharynx: Oropharynx is clear.   Eyes:      General: Scleral icterus present.   Cardiovascular:      Rate and Rhythm: Normal rate and regular rhythm.      Heart sounds: Normal heart sounds.   Pulmonary:      Breath sounds: Normal breath sounds.   Abdominal:      General: There is distension.   Musculoskeletal:      Right lower leg: Edema present.      Left lower leg: Edema  present.   Skin:     General: Skin is warm and dry.      Capillary Refill: Capillary refill takes less than 2 seconds.   Neurological:      Mental Status: He is oriented to person, place, and time.             Significant Labs: All pertinent labs within the past 24 hours have been reviewed.    Significant Imaging: I have reviewed all pertinent imaging results/findings within the past 24 hours.

## 2023-08-31 LAB
ABO + RH BLD: NORMAL
ALBUMIN SERPL BCP-MCNC: 1.7 G/DL (ref 3.5–5)
ALBUMIN/GLOB SERPL: 0.4 {RATIO}
ALP SERPL-CCNC: 114 U/L (ref 45–115)
ALT SERPL W P-5'-P-CCNC: 40 U/L (ref 16–61)
ANION GAP SERPL CALCULATED.3IONS-SCNC: 10 MMOL/L (ref 7–16)
AST SERPL W P-5'-P-CCNC: 66 U/L (ref 15–37)
BASOPHILS # BLD AUTO: 0.12 K/UL (ref 0–0.2)
BASOPHILS NFR BLD AUTO: 0.7 % (ref 0–1)
BILIRUB SERPL-MCNC: 6.2 MG/DL (ref ?–1.2)
BLD PROD TYP BPU: NORMAL
BLOOD UNIT EXPIRATION DATE: NORMAL
BLOOD UNIT TYPE CODE: 5100
BUN SERPL-MCNC: 25 MG/DL (ref 7–18)
BUN/CREAT SERPL: 14 (ref 6–20)
CALCIUM SERPL-MCNC: 7.4 MG/DL (ref 8.5–10.1)
CHLORIDE SERPL-SCNC: 97 MMOL/L (ref 98–107)
CO2 SERPL-SCNC: 25 MMOL/L (ref 21–32)
CREAT SERPL-MCNC: 1.78 MG/DL (ref 0.7–1.3)
CROSSMATCH INTERPRETATION: NORMAL
DIFFERENTIAL METHOD BLD: ABNORMAL
DISPENSE STATUS: NORMAL
EGFR (NO RACE VARIABLE) (RUSH/TITUS): 46 ML/MIN/1.73M2
EOSINOPHIL # BLD AUTO: 0.9 K/UL (ref 0–0.5)
EOSINOPHIL NFR BLD AUTO: 5.6 % (ref 1–4)
ERYTHROCYTE [DISTWIDTH] IN BLOOD BY AUTOMATED COUNT: 18.7 % (ref 11.5–14.5)
GLOBULIN SER-MCNC: 3.9 G/DL (ref 2–4)
GLUCOSE SERPL-MCNC: 102 MG/DL (ref 74–106)
HCT VFR BLD AUTO: 18.5 % (ref 40–54)
HCT VFR BLD AUTO: 21.3 % (ref 40–54)
HCT VFR BLD AUTO: 21.6 % (ref 40–54)
HCT VFR BLD AUTO: 21.7 % (ref 40–54)
HCT VFR BLD AUTO: 22.5 % (ref 40–54)
HGB BLD-MCNC: 6.1 G/DL (ref 13.5–18)
HGB BLD-MCNC: 7.2 G/DL (ref 13.5–18)
HGB BLD-MCNC: 7.3 G/DL (ref 13.5–18)
HGB BLD-MCNC: 7.4 G/DL (ref 13.5–18)
HGB BLD-MCNC: 7.5 G/DL (ref 13.5–18)
IMM GRANULOCYTES # BLD AUTO: 0.3 K/UL (ref 0–0.04)
IMM GRANULOCYTES NFR BLD: 1.9 % (ref 0–0.4)
INR BLD: 1.88
LYMPHOCYTES # BLD AUTO: 2.41 K/UL (ref 1–4.8)
LYMPHOCYTES NFR BLD AUTO: 15 % (ref 27–41)
MCH RBC QN AUTO: 29.3 PG (ref 27–31)
MCHC RBC AUTO-ENTMCNC: 33.3 G/DL (ref 32–36)
MCV RBC AUTO: 87.9 FL (ref 80–96)
MONOCYTES # BLD AUTO: 2.74 K/UL (ref 0–0.8)
MONOCYTES NFR BLD AUTO: 17.1 % (ref 2–6)
MPC BLD CALC-MCNC: 12.2 FL (ref 9.4–12.4)
NEUTROPHILS # BLD AUTO: 9.58 K/UL (ref 1.8–7.7)
NEUTROPHILS NFR BLD AUTO: 59.7 % (ref 53–65)
NRBC # BLD AUTO: 0 X10E3/UL
NRBC, AUTO (.00): 0 %
PLATELET # BLD AUTO: 80 K/UL (ref 150–400)
POTASSIUM SERPL-SCNC: 3.8 MMOL/L (ref 3.5–5.1)
PROT SERPL-MCNC: 5.6 G/DL (ref 6.4–8.2)
PROTHROMBIN TIME: 21.3 SECONDS (ref 11.7–14.7)
RBC # BLD AUTO: 2.56 M/UL (ref 4.6–6.2)
SODIUM SERPL-SCNC: 128 MMOL/L (ref 136–145)
UNIT NUMBER: NORMAL
WBC # BLD AUTO: 16.05 K/UL (ref 4.5–11)

## 2023-08-31 PROCEDURE — 25000003 PHARM REV CODE 250

## 2023-08-31 PROCEDURE — 11000001 HC ACUTE MED/SURG PRIVATE ROOM

## 2023-08-31 PROCEDURE — 99233 PR SUBSEQUENT HOSPITAL CARE,LEVL III: ICD-10-PCS | Mod: GC,,, | Performed by: FAMILY MEDICINE

## 2023-08-31 PROCEDURE — 80053 COMPREHEN METABOLIC PANEL: CPT | Performed by: STUDENT IN AN ORGANIZED HEALTH CARE EDUCATION/TRAINING PROGRAM

## 2023-08-31 PROCEDURE — 85025 COMPLETE CBC W/AUTO DIFF WBC: CPT | Performed by: STUDENT IN AN ORGANIZED HEALTH CARE EDUCATION/TRAINING PROGRAM

## 2023-08-31 PROCEDURE — 63600175 PHARM REV CODE 636 W HCPCS: Performed by: INTERNAL MEDICINE

## 2023-08-31 PROCEDURE — 85610 PROTHROMBIN TIME: CPT | Performed by: STUDENT IN AN ORGANIZED HEALTH CARE EDUCATION/TRAINING PROGRAM

## 2023-08-31 PROCEDURE — 25000003 PHARM REV CODE 250: Performed by: STUDENT IN AN ORGANIZED HEALTH CARE EDUCATION/TRAINING PROGRAM

## 2023-08-31 PROCEDURE — C9113 INJ PANTOPRAZOLE SODIUM, VIA: HCPCS | Performed by: INTERNAL MEDICINE

## 2023-08-31 PROCEDURE — 63600175 PHARM REV CODE 636 W HCPCS

## 2023-08-31 PROCEDURE — P9016 RBC LEUKOCYTES REDUCED: HCPCS

## 2023-08-31 PROCEDURE — 85014 HEMATOCRIT: CPT

## 2023-08-31 PROCEDURE — 99233 SBSQ HOSP IP/OBS HIGH 50: CPT | Mod: GC,,, | Performed by: FAMILY MEDICINE

## 2023-08-31 PROCEDURE — 63600175 PHARM REV CODE 636 W HCPCS: Performed by: STUDENT IN AN ORGANIZED HEALTH CARE EDUCATION/TRAINING PROGRAM

## 2023-08-31 PROCEDURE — 85014 HEMATOCRIT: CPT | Performed by: FAMILY MEDICINE

## 2023-08-31 RX ORDER — SODIUM CHLORIDE 9 MG/ML
INJECTION, SOLUTION INTRAVENOUS CONTINUOUS
Status: DISCONTINUED | OUTPATIENT
Start: 2023-08-31 | End: 2023-08-31

## 2023-08-31 RX ORDER — HYDROCODONE BITARTRATE AND ACETAMINOPHEN 500; 5 MG/1; MG/1
TABLET ORAL
Status: DISCONTINUED | OUTPATIENT
Start: 2023-08-31 | End: 2023-09-02

## 2023-08-31 RX ADMIN — RIFAXIMIN 550 MG: 550 TABLET ORAL at 09:08

## 2023-08-31 RX ADMIN — PROPRANOLOL HYDROCHLORIDE 10 MG: 10 TABLET ORAL at 09:08

## 2023-08-31 RX ADMIN — SODIUM CHLORIDE: 9 INJECTION, SOLUTION INTRAVENOUS at 12:08

## 2023-08-31 RX ADMIN — RIFAXIMIN 550 MG: 550 TABLET ORAL at 08:08

## 2023-08-31 RX ADMIN — OXYCODONE AND ACETAMINOPHEN 1 TABLET: 10; 325 TABLET ORAL at 12:08

## 2023-08-31 RX ADMIN — SODIUM CHLORIDE: 9 INJECTION, SOLUTION INTRAVENOUS at 02:08

## 2023-08-31 RX ADMIN — LACTULOSE 10 G: 20 SOLUTION ORAL at 08:08

## 2023-08-31 RX ADMIN — FUROSEMIDE 20 MG: 10 INJECTION, SOLUTION INTRAVENOUS at 08:08

## 2023-08-31 RX ADMIN — SPIRONOLACTONE 25 MG: 25 TABLET ORAL at 09:08

## 2023-08-31 RX ADMIN — FOLIC ACID 1 MG: 1 TABLET ORAL at 09:08

## 2023-08-31 RX ADMIN — THIAMINE HYDROCHLORIDE 500 MG: 100 INJECTION, SOLUTION INTRAMUSCULAR; INTRAVENOUS at 08:08

## 2023-08-31 RX ADMIN — PANTOPRAZOLE SODIUM 40 MG: 40 INJECTION, POWDER, FOR SOLUTION INTRAVENOUS at 09:08

## 2023-08-31 RX ADMIN — THIAMINE HYDROCHLORIDE 500 MG: 100 INJECTION, SOLUTION INTRAMUSCULAR; INTRAVENOUS at 03:08

## 2023-08-31 RX ADMIN — OXYCODONE AND ACETAMINOPHEN 1 TABLET: 10; 325 TABLET ORAL at 09:08

## 2023-08-31 RX ADMIN — FUROSEMIDE 20 MG: 10 INJECTION, SOLUTION INTRAVENOUS at 09:08

## 2023-08-31 RX ADMIN — LACTULOSE 10 G: 20 SOLUTION ORAL at 09:08

## 2023-08-31 RX ADMIN — THIAMINE HYDROCHLORIDE 500 MG: 100 INJECTION, SOLUTION INTRAMUSCULAR; INTRAVENOUS at 09:08

## 2023-08-31 NOTE — ASSESSMENT & PLAN NOTE
MELD score 22 points. 19.6% with estimated 3 month mortality  CIWA score 0    MRCP reveals cirrhosis and multiple gallstones      8/27: Pt is noted to have scleral icterus, bili 5.5, and continued hematochezia.   Put in for transfer ([facility] order) for higher level of care. At 13:25, [facility] has no beds available, per Kaylee Perales RN. At 13:56, [facility] per Dr. Riggs has no available beds & is on diversion.  Call [facility] on 8/28 to see if any beds are available [phone]   8/30: pending transfer from [facility].   8/31: pending transfer.

## 2023-08-31 NOTE — ASSESSMENT & PLAN NOTE
Likely secondary to liver cirrhosis    Per Dr. Gusman:  Active arterial bleeding on endoscopy in anal canal; too friable for clip. (In OR) Arterial bleeding at the 9 o'clock position in the anal canal; multiple bleeding hemorrhoidal plexus at 6 o'clock position on the posterior canal and at 3 o'clock position    Patient received 2 units of packed red blood cells in the OR and we will give FFP    8/25: No bleeding overnight  8/26/23 no obvious bleeding on rectal dressing    8/27: Rectal bleeding noted this morning. Rectal dressing/pad saturated with blod and clots. 1U PRBC ordered.   8/30: rectal bleeding this morning one unit of RBC ordered.   8/30: rectal bleeding this morning one unit of RBC ordered.

## 2023-08-31 NOTE — PROGRESS NOTES
[facility] - [facility] Tele[facility] Hospital Medicine  Progress Note    Patient Name: John Spears  MRN: [id number]  Patient Class: IP- Inpatient   Admission Date: 8/19/2023  Length of Stay: 11 days  Attending Physician: Katherine Horton DO  Primary Care Provider: Romel Miranda        Subjective:     Principal Problem:Bleeding hemorrhoid        HPI:  Patient is a 49 y/o male with PMH of HTN, HLD, cirrhosis of the liver and hemorrhoids who presents to the ED with complaint of COB and swelling of the abdomen, legs and feet that started about 1 month ago. Patient states that the swelling and SOB has been worsening for the last 2 weeks. Patient reports passing dark blood per rectum with bowel movement for the last 2 weeks. Patient reports having intermittent RUQ when he eats fried foot. Patient reports chills but no fever. Patient denies nausea, vomiting, chest pain or urinary changes. Patient reports drinking about 10 beers of 16 ounces per day for the 3 years and for the last months he stopped drinking beer and started drinking  liquor. He states that 1 fifth of liquor lasts for 3 days. Patient reports withdrawal symptoms in the past. Patient reports that he quit smoking cigarettes 5 years ago.      In the ED showed /67, HR 90, RR 16, SpO2 98% and afebrile. Blood work showed H/H 6.1/19, WBC 5.6, PLT 78, PT 21.2, INR 1.87, PTT 48.2. Sodium 134, potassium 2.8, Alkaline phosphatase 166, Total bilirrubin 6.5, AST/ALST 61/41, p-, troponin 16.7. UA negative for infection or blood. FOBT negative. EKG nsr with HR 89. Patient is receiving PRBC in the ER at this moment and electrolytes are being replenished. Patient will be admitted to the hospital for further management.       Overview/Hospital Course:  No notes on file    Interval History: Patient is lying in bed. Patient is overall getting worse, still awaiting transfer to high level of care facility. Patient required additional unit of  blood this morning. Will continue to monitor.    Review of Systems   Constitutional:  Positive for activity change. Negative for fatigue.   Respiratory:  Negative for chest tightness and shortness of breath.    Cardiovascular:  Positive for leg swelling. Negative for chest pain.   Gastrointestinal:  Positive for abdominal distention and anal bleeding.   Skin:  Negative for rash and wound.   Neurological:  Negative for tremors.     Objective:     Vital Signs (Most Recent):  Temp: 97.8 °F (36.6 °C) (08/31/23 1500)  Pulse: 70 (08/31/23 1500)  Resp: 18 (08/31/23 1500)  BP: (!) 110/59 (08/31/23 1500)  SpO2: 98 % (08/31/23 1500) Vital Signs (24h Range):  Temp:  [97.2 °F (36.2 °C)-98.2 °F (36.8 °C)] 97.8 °F (36.6 °C)  Pulse:  [70-79] 70  Resp:  [16-18] 18  SpO2:  [95 %-100 %] 98 %  BP: ()/(54-85) 110/59     Weight: (!) 149.7 kg (330 lb 0.5 oz)  Body mass index is 54.92 kg/m².  No intake or output data in the 24 hours ending 08/31/23 1622      Physical Exam  Vitals and nursing note reviewed.   Constitutional:       Appearance: He is obese. He is ill-appearing.      Comments: Pt is somnolent but aroused easily   HENT:      Head: Normocephalic.      Right Ear: External ear normal.      Left Ear: External ear normal.      Nose: Nose normal.      Mouth/Throat:      Mouth: Mucous membranes are moist.      Pharynx: Oropharynx is clear.   Eyes:      General: Scleral icterus present.   Cardiovascular:      Rate and Rhythm: Normal rate and regular rhythm.      Heart sounds: Normal heart sounds.   Pulmonary:      Breath sounds: Normal breath sounds.   Abdominal:      General: There is distension.   Musculoskeletal:      Right lower leg: Edema present.      Left lower leg: Edema present.   Skin:     General: Skin is warm and dry.      Capillary Refill: Capillary refill takes less than 2 seconds.   Neurological:      Mental Status: He is oriented to person, place, and time.             Significant Labs: All pertinent labs within  the past 24 hours have been reviewed.    Significant Imaging: I have reviewed all pertinent imaging results/findings within the past 24 hours.      Assessment/Plan:      * Bleeding hemorrhoid  Likely secondary to liver cirrhosis    Per Dr. Gusman:  Active arterial bleeding on endoscopy in anal canal; too friable for clip. (In OR) Arterial bleeding at the 9 o'clock position in the anal canal; multiple bleeding hemorrhoidal plexus at 6 o'clock position on the posterior canal and at 3 o'clock position    Patient received 2 units of packed red blood cells in the OR and we will give FFP    8/25: No bleeding overnight  8/26/23 no obvious bleeding on rectal dressing    8/27: Rectal bleeding noted this morning. Rectal dressing/pad saturated with blod and clots. 1U PRBC ordered.   8/30: rectal bleeding this morning one unit of RBC ordered.   8/30: rectal bleeding this morning one unit of RBC ordered.     Hyperammonemia  8/27: Continue with rifaxmin  Started on lactulose on 8/26  Ammonia level 52      Acute lower GI bleeding    Patient received 2 units of packed red blood cells in the OR and we will give FFP  8/25: Hgb 7.8  8/26/23 no obvious rectal bleeding  8/30: 1 unit of blood ordered.   8/31: 1 unit of blood     Hypertension  Monitor BP    Alcoholic cirrhosis of liver  MELD score 22 points. 19.6% with estimated 3 month mortality  CIWA score 0    MRCP reveals cirrhosis and multiple gallstones      8/27: Pt is noted to have scleral icterus, bili 5.5, and continued hematochezia.   Put in for transfer ([facility] order) for higher level of care. At 13:25, [facility] has no beds available, per Kaylee Perales RN. At 13:56, [facility] per Dr. Riggs has no available beds & is on diversion.  Call [facility] on 8/28 to see if any beds are available [phone]   8/30: pending transfer from [facility].   8/31: pending transfer.         Anemia  Acute blood loss     Per significant other, had hematochezia 3x this morning. Noted pad saturated with blood and  clots.  Hgb 7.2. 1U PRBC noted. Dr. Mahajan informed.   Monitor CBC  8/30: Hgb in morning was 6.1, one unit of Rbc ordered on noon check it was 7.2 will get H/H q 6hr, will continue to monitor and transfuse appropriately, will hopefully transfer soon.   8/31: Hgb this morning was 6.1, one unit of RBC ordered, at noon H/H was 7.3 will continue to trend H/H    Hypoalbuminemia  Likely from malturition + or - synthetic function of the liver  C/w Spironolactone and lasix      VTE Risk Mitigation (From admission, onward)         Ordered     Reason for No Pharmacological VTE Prophylaxis  Once        Question:  Reasons:  Answer:  Active Bleeding    08/20/23 0043     IP VTE HIGH RISK PATIENT  Once         08/20/23 0043     Place sequential compression device  Until discontinued         08/20/23 0043                Discharge Planning   LUCRECIA:      Code Status: Full Code   Is the patient medically ready for discharge?:     Reason for patient still in hospital (select all that apply): Treatment  Discharge Plan A: Home with family                  Zach Morales DO  Department of Hospital Medicine   [facility] - [facility] Tele[facility]

## 2023-08-31 NOTE — ASSESSMENT & PLAN NOTE
Patient received 2 units of packed red blood cells in the OR and we will give FFP  8/25: Hgb 7.8  8/26/23 no obvious rectal bleeding  8/30: 1 unit of blood ordered.   8/31: 1 unit of blood      RE: Eloise Johnson  [address] [facility] [address]     Dear Colleague,    Thank you for referring your patient, Eloise Johnson, to the [facility] DERMATOLOGY at [facility]. Please see a copy of my visit note below.    [facility] Dermatology Note    Dermatology Clinic  [facility]  [address] [place] [id number]    Dermatology Problem List:  1. Hx of wart right posterior upper arm, s/p biopsy 10/11/2018  2. Hx of eyelid dermatitis  -Previously seen at [facility]: treated with Alcometazone cream, Westcort ointment, protopic ointment --- all lost efficacy  -Concerned for ACD, biopsy 1/17/19 most c/w seborrheic dermatitis   3. Vitiligo: hx of eczimer laser, topical steroids, and protopic 0.1% ointment  4. Rash/eruption, right lateral canthus, s/p biopsy 1/17/2019 with subacute spongiotic dermatitis    Encounter Date: Mar 18, 2019    CC:  Chief Complaint   Patient presents with     Allergies     Leobardo is here for patch tetsing day        History of Present Illness:  Mr. Eloise Johnson is a 48 year old male who presents as a referral from Dr. Velázquez in regards to a patch testing consult. His previous visit with Dr. Velázquez was on 01/17/2019 where he received a punch biopsy for the facial rash/skin eruption around the right side of his eye. Biopsy showed subacute spongiotic dermatitis with no prominent Langerhans' cell collections or significant tissue eosinophilia. The patient has been unresponsive to topical steroids and protopic treatments in the past, but has not tried Elidel. He started ketoconazole shampoo and cream after receiving the biopsy results as it was read as more consistent with seborrheic dermatitis and has noticed some improvement.     Today he states the rash is much better but not gone. He still has some redness, scaling, and itching on and around his eyelids, on his external  ears, a patch on his back, and a patch on his right thigh.. He tries to remove the scale in the gym steam room but it quickly reaccumulates. The rash does not happen in the summer and is worse in the winter with lots of redness, scaling, and itchiness at night.     An allergy to gym towels or another product at his gym was considered because the rash started after he began going to the gym. He had no change in the rash after avoiding the gym for 1 week. He uses Head and Shoulders shampoo and Vanicream moisturizer on his face. Denies using hairspray or other products on his face and head. He used hydrocortisone on his right thigh without significant improvement in the past, so now only uses Vaseline in that area. Denies perfumes or essential oil diffusers in the home. 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 Hobbies include exercising at the gym, using the hot tub, and singing Karaoke.  He notices redness with a small amount of pressure when rubbing face with a towel or applying medications.   He has history of eczema a few years on his face that resolved with some cream. A few months ago (before holidays) he had several weeks of laser treatment on left and right cheek at [facility] in [place] for hypopigmentation/vitiligo.     No personal or family history of asthma or seasonal allergies.  No lip swelling.    He wonders if this is neurodermatitis. He is reluctant to pursue allergy testing because he heard that he would not be able to shower for a week.        Past Medical History:   Patient Active Problem List   Diagnosis     Mixed hyperlipidemia     Chronic fatigue     Cough     High myopia, both eyes     Family history of early CAD     HDL deficiency     Cardiomyopathy of undetermined type (H)     HUEY (obstructive sleep apnea)- moderate (AHI 21)     Acute medial meniscus tear of right knee     Nasal  congestion     Past Medical History:   Diagnosis Date     Anxiety 05/01/2017    resolved 2018     Psychophysiological insomnia 05/01/2017    resolved 2018     Past Surgical History:   Procedure Laterality Date     COLONOSCOPY WITH CO2 INSUFFLATION N/A 10/24/2014    Procedure: COLONOSCOPY WITH CO2 INSUFFLATION;  Surgeon: Duane, William Charles, MD;  Location: [facility] OR     HC TOOTH EXTRACTION W/FORCEP         Social History:  Patient reports that he has quit smoking. His smoking use included cigarettes. He has a 2.00 pack-year smoking history. he has never used smokeless tobacco. He reports that he drinks about 0.5 - 1.0 oz of alcohol per week. He reports that he does not use drugs.    Family History:  Family History   Problem Relation Age of Onset     C.A.D. Father      Coronary Artery Disease Father      Eye Surgery Mother      Diabetes Maternal Grandmother      Colon Cancer No family hx of      Glaucoma No family hx of      Macular Degeneration No family hx of      Skin Cancer No family hx of        Medications:  Current Outpatient Medications   Medication Sig Dispense Refill     aspirin EC 81 MG tablet Take 1 tablet (81 mg) by mouth every other day       atorvastatin (LIPITOR) 10 MG tablet Take 1 tablet (10 mg) by mouth daily 90 tablet 3     Cholecalciferol (VITAMIN D) 1000 UNITS capsule Take 1 capsule by mouth daily       ketoconazole (NIZORAL) 2 % external cream Apply up to twice daily to the face as needed. 60 g 11     ketoconazole (NIZORAL) 2 % external shampoo Lather small amount onto wet face/scalp, leave on 3-5 min, then rinse out. Use daily on the face, 2-3 times weekly on the scalp. (Patient not taking: Reported on 3/4/2019) 120 mL 11     mometasone (NASONEX) 50 MCG/ACT nasal spray Spray 2 sprays into both nostrils daily (Patient not taking: Reported on 3/4/2019) 1 g 0     Multiple Vitamin (MULTI-VITAMIN) per tablet Take 1 tablet by mouth daily.       omega-3 fatty acids 1200 MG capsule Take 2 capsules by  mouth daily.       order for DME autoCPAP 5-18 cmH20 (Patient not taking: Reported on 3/4/2019) 1 Device 0     tobramycin-dexamethasone (TOBRADEX) 0.3-0.1 % ophthalmic ointment Apply to eyelids 3 x day for 1 week. (Patient not taking: Reported on 3/4/2019) 1 Tube 0     No Known Allergies    Physical exam:  Vitals: There were no vitals taken for this visit.  GEN: This is a well developed, well-nourished male in no acute distress, in a pleasant mood.    SKIN: Focused examination of the face, neck, and right thigh was performed.  -Erythematous, lichenified, white-scaled plaques on bilateral upper eyelids above eyebrows superiorly and laterally. Upper eyelid swelling noted in prior photos decreased today.    -Erythematous, lichenified, white scaled plaques on preauricular cheeks extending to external ears and into conchal bowl.  -Erythematous plaques with scattered erosions and linear red-brown crusting on bilateral nasolabial creases  -Circular, eczematous plaque on posterior neck/upper back and on right anterior thigh.  -No other lesions of concern on areas examined.        Order for PATCH TESTS    [x] Outpatient  [] Inpatient: Álvarez..../ Bed ....      Skin Atopy (atopic dermatitis) [x] Yes   [] No  Rhinitis/Sinusitis:   [] Yes   [x] No  Allergic Asthma:   [] Yes   [x] No  Food Allergy:   [] Yes   [x] No  Leg ulcers:   [] Yes   [x] No  Hand eczema:   [] Yes   [x] No   Leading hand:   [] R   [] L       [] Ambidextrous                        Reason for tests (suspected allergy): Distribution on face and neck concerning for reaction to Head & Shoulders shampoo, gym towelsor laundry detergent  Known previous allergies: None     Standardized panels  [x] Standard panel (40 tests)  [x] Preservatives & Antimicrobials (31 tests)  [x] Emulsifiers & Additives (25 tests)   [] Perfumes/Flavours & Plants (25 tests)  [] Hairdresser panel (12 tests)  [] Rubber Chemicals (22 tests)  [] Plastics (26 tests)  [] Colorants/Dyes/Food  additives (20 tests)  [] Metals (implants/dental) (23 tests)  [] Local anaesthetics/NSAIDs (13 tests)  [x] Antibiotics & Antimycotics (14 tests)   [] Corticosteroids (15 tests)   [] Photopatch test (62 tests)   [] others: ...      [] Patient's own products: ...    DO NOT test if chemical or biological identity is unknown!     always ask from patient the product information and safety sheets (MSDS)     [] Patient needs consultation with Allergy team (changes of tests may apply)  [] Tests discussed with Allergy team (can have direct appointment for patch tests)    RESULTS & EVALUATION of PATCH TESTS    Patch test readings after     [x] 2 days, [] 3 days [x] 4 days, [] 5 days,    Applied patch tests with results (import here the list of patch tests):  Date/time of application:03/18/2019  Physician/Nurse:  / Rowena Serrato, [facility]               Localization of application: Back  STANDARD Series         No Substance 2 days 4 days remarks   1 Jeremías Mix [C] - -    2 Colophony - -    3  2-Mercaptobenzothiazole  - -     4 Methylisothiazolinone - -    5 Carba Mix - -    6 Thiuram Mix [A] - -    7 Bisphenol A Epoxy Resin - -    8 B-Jyif-Ggkwxmrenjm-Formaldehyde Resin - -    9 Mercapto Mix [A] - -    10 Black Rubber Mix- PPD [B] - -    11 Potassium Dichromate  -  -    12 Balsam of [place] (Myroxylon Pereirae Resin) - -    13 Nickel Sulphate Hexahydrate - -    14 Mixed Dialkyl Thiourea - -    15 Paraben Mix [B] - -    16 Methyldibromo Glutaronitrile - -    17 Fragrance Mix - -    18 2-Bromo-2-Nitropropane-1,3-Diol (Bronopol) - -    19 Lyral - -    20 Tixocortol-21- Pivalate - -    21 Diazolidiyl Urea (Germall II) - -    22 Methyl Methacrylate - -    23 Cobalt (II) Chloride Hexahydrate - -    24 Fragrance Mix II  - -    25 Compositae Mix - -    26 Benzoyl Peroxide - -    27 Bacitracin - -    28 Formaldehyde - -    29 Methylchloroisothiazolinone / Methylisothiazolinone - -    30 Corticosteroid Mix - -    31 Sodium Lauryl  Sulfate - -    32 Lanolin Alcohol - -    33 Turpentine - -    34 Cetylstearylalcohol - -    35 Chlorhexidine Dicluconate - -    36 Budenoside - -    37 Imidazolidinyl Urea  - -    38 Ethyl-2 Cyanoacrylate - -    39 Quaternium 15 (Dowicil 200) - -    40 Decyl Glucoside - -      EMULSIFIERS & ADDITIVES        No Substance 2 days 4 days remarks   41 Polyethylene Glycol-400 - -    42 Cocamidopropyl Betaine - -    43 Amerchol L101 - -    44 Propylene Glycol - -    45 Triethanolamine - -    46 Sorbitane Sesquiolate - -    47 Isopropylmyristate - -    48 Polysorbate 80  - -    49 Amidoamine   (Stearamidopropyl Dimethylamine) - -    50 Oleamidopropyl Dimethylamine - -    51 Lauryl Glucoside - -    52 Coconut Diethanolamide  - -    53 2-Hydroxy-4-Methoxy Benzophenone (Oxybenzone) - -    54 Benzophenone-4 (Sulisobenzon) - -    55 Propolis - -    56 Dexpanthenol - -    57 Carboxymethyl Cellulose Sodium - -    58 Abitol - -    59 Tert-Butylhydroquinone - -    60 Benzyl Salicylate - -     Antioxidant      61 Dodecyl Gallate - -    62 Butylhydroxyanisole (BHA) - -    63 Butylhydroxytoluene (BHT) - -    64 Di-Alpha-Tocopherol (Vit E) - -    65 Propyl Gallate - -      PRESERVATIVES & ANTIMICROBIALS         No Substance 2 days 4 days remarks   66  1,2-Benzisothiazoline-3-One, Sodium Salt - -    67  1,3,5-Javier (2-Hydroxyethyl) - Hexahydrotriazine (Grotan BK) - -    68 0-Iambxmxjesdus-1-Nitro-1, 3-Propanediol - -    69  3, 4, 4' - Triclocarban - -    70 4 - Chloro - 3 - Cresol - -    71 4 - Chloro - 4 - Xylenol (PCMX) - -    72 7-Ethylbicyclooxazolidine (Bioban [id number]) - -    73 Benzalkonium Chloride - -    74 Benzyl Alcohol - -    75 Cetalkonium Chloride - -    76 Cetylpyrimidine Chloride  - -    77 Chloroacetamide - -    78 DMDM Hydantoin - -    79 Glutaraldehyde - -    80 Triclosan - -    81 Glyoxal Trimeric Dihydrate - -    82 Iodopropynyl Butylcarbamate - -    83 Octylisothiazoline - -    84 Iodoform - -    85 (Nitrobutyl)  Morpholine/(Ethylnitro-Trimethylene) Dimorpholine (Bioban P 1487) - -    86 Phenoxyethanol - -    87 Phenyl Salicylate - -    88 Povidone Iodine - -    89 Sodium Benzoate - -    90 Sodium Disulfite - -    91 Sorbic Acid - -    92 Thimerosal - -     Parabens      93 Butyl-P-Hydroxybenzoate - -    94 Ethyl-P-Hydroxybenzoate - -    95 Methyl-P-Hydroxybenzoate - -    96 Propyl-P-Hydroxybenzoate - -      ANTIBIOTICS & ANTIMYCOTICS         No Substance 2 days 4 days remarks   97 Erythromycine - -    98 Framycetine Sulphate - -    99 Fusidic Acid Sodium Salt - -    100 Gentamicin Sulphate - -    101 Neomycine Sulphate - -    102 Oxytetracycline  - -    103 Polymyxin B Sulphate - -    104 Tetracycline-HCL - -    105 Sulfanilamide - -    106 Metronidazole - -    107 Oxyquinoline Mix - -    108 Nitrofurazone - -    109 Nystatin - -    110 Clotrimazole - -      Results of patch tests:                         Interpretation:    - Negative                    A    = Allergic      (+) Erythema    TI   = Toxic/irritant   + E + Infiltration    RaP = Relevance at Present     ++ E/I + Papulovesicle   Rpr  = Relevance Previously     +++ E/I/P + Blister     nR   = No Relevance    [] No relevant allergic reaction observed    [] Allergic reaction diagnosed against: see later      Interpretation/ remarks:   See later    [] Patient information given   [] ACSD information   [] SmartPractice information    ==> final Diagnosis:  >>Subacute to chronic allergic contact dermatitis. Differential diagnosis includes atopic dermatitis.   Allergic contact dermatitis is more likely at this point given new onset, lack of atopic predisposition (no seasonal allergies or asthma), and distrubution.  Plan for patch testing to identify sensitivities or to rule out ACD if negative and treat as atopic dermatitis. Seborrheic dermatitis suggested in biopsy comments but is not consistent with location, degree of erythema, and lichenification seen on exam. The  scale present is secondary to lichenification and is not greasy or yellow.      These conclusions are made at the best of ones knowledge and belief  based on the provided evidence such as patients history and allergy test results and they can change over time or can be incomplete because of missing informations.    ==> Treatment prescribed/Plan:  Treatment  -Stop Head & Shoulders shampoo, switch to Vanicream brand Free and Clear shampoo. Continue Vanicream moisturizer.    Patient had 110 patch tests placed this visit.    Standard panel (40 tests)    Preservatives & Antimicrobials (31 tests)    Emulsifiers & Additives (25 tests)     Antibiotics & Antimycotics (14 tests)     Again, thank you for allowing me to participate in the care of your patient.      Sincerely,    Garrick Loyd MD

## 2023-08-31 NOTE — ASSESSMENT & PLAN NOTE
Acute blood loss     Per significant other, had hematochezia 3x this morning. Noted pad saturated with blood and clots.  Hgb 7.2. 1U PRBC noted. Dr. Mahajan informed.   Monitor CBC  8/30: Hgb in morning was 6.1, one unit of Rbc ordered on noon check it was 7.2 will get H/H q 6hr, will continue to monitor and transfuse appropriately, will hopefully transfer soon.   8/31: Hgb this morning was 6.1, one unit of RBC ordered, at noon H/H was 7.3 will continue to trend H/H

## 2023-08-31 NOTE — SUBJECTIVE & OBJECTIVE
Interval History: Patient is lying in bed. Patient is overall getting worse, still awaiting transfer to high level of care facility. Patient required additional unit of blood this morning. Will continue to monitor.    Review of Systems   Constitutional:  Positive for activity change. Negative for fatigue.   Respiratory:  Negative for chest tightness and shortness of breath.    Cardiovascular:  Positive for leg swelling. Negative for chest pain.   Gastrointestinal:  Positive for abdominal distention and anal bleeding.   Skin:  Negative for rash and wound.   Neurological:  Negative for tremors.     Objective:     Vital Signs (Most Recent):  Temp: 97.8 °F (36.6 °C) (08/31/23 1500)  Pulse: 70 (08/31/23 1500)  Resp: 18 (08/31/23 1500)  BP: (!) 110/59 (08/31/23 1500)  SpO2: 98 % (08/31/23 1500) Vital Signs (24h Range):  Temp:  [97.2 °F (36.2 °C)-98.2 °F (36.8 °C)] 97.8 °F (36.6 °C)  Pulse:  [70-79] 70  Resp:  [16-18] 18  SpO2:  [95 %-100 %] 98 %  BP: ()/(54-85) 110/59     Weight: (!) 149.7 kg (330 lb 0.5 oz)  Body mass index is 54.92 kg/m².  No intake or output data in the 24 hours ending 08/31/23 1622      Physical Exam  Vitals and nursing note reviewed.   Constitutional:       Appearance: He is obese. He is ill-appearing.      Comments: Pt is somnolent but aroused easily   HENT:      Head: Normocephalic.      Right Ear: External ear normal.      Left Ear: External ear normal.      Nose: Nose normal.      Mouth/Throat:      Mouth: Mucous membranes are moist.      Pharynx: Oropharynx is clear.   Eyes:      General: Scleral icterus present.   Cardiovascular:      Rate and Rhythm: Normal rate and regular rhythm.      Heart sounds: Normal heart sounds.   Pulmonary:      Breath sounds: Normal breath sounds.   Abdominal:      General: There is distension.   Musculoskeletal:      Right lower leg: Edema present.      Left lower leg: Edema present.   Skin:     General: Skin is warm and dry.      Capillary Refill: Capillary  refill takes less than 2 seconds.   Neurological:      Mental Status: He is oriented to person, place, and time.             Significant Labs: All pertinent labs within the past 24 hours have been reviewed.    Significant Imaging: I have reviewed all pertinent imaging results/findings within the past 24 hours.

## 2023-09-01 LAB
ABO + RH BLD: NORMAL
ALBUMIN SERPL BCP-MCNC: 1.7 G/DL (ref 3.5–5)
ALBUMIN/GLOB SERPL: 0.4 {RATIO}
ALP SERPL-CCNC: 123 U/L (ref 45–115)
ALT SERPL W P-5'-P-CCNC: 42 U/L (ref 16–61)
ANION GAP SERPL CALCULATED.3IONS-SCNC: 10 MMOL/L (ref 7–16)
AST SERPL W P-5'-P-CCNC: 68 U/L (ref 15–37)
BASOPHILS # BLD AUTO: 0.15 K/UL (ref 0–0.2)
BASOPHILS NFR BLD AUTO: 1 % (ref 0–1)
BILIRUB SERPL-MCNC: 5.8 MG/DL (ref ?–1.2)
BLD PROD TYP BPU: NORMAL
BLOOD UNIT EXPIRATION DATE: NORMAL
BLOOD UNIT TYPE CODE: 5100
BUN SERPL-MCNC: 29 MG/DL (ref 7–18)
BUN/CREAT SERPL: 15 (ref 6–20)
CALCIUM SERPL-MCNC: 7.6 MG/DL (ref 8.5–10.1)
CHLORIDE SERPL-SCNC: 96 MMOL/L (ref 98–107)
CO2 SERPL-SCNC: 26 MMOL/L (ref 21–32)
CREAT SERPL-MCNC: 1.95 MG/DL (ref 0.7–1.3)
CROSSMATCH INTERPRETATION: NORMAL
DIFFERENTIAL METHOD BLD: ABNORMAL
DISPENSE STATUS: NORMAL
EGFR (NO RACE VARIABLE) (RUSH/TITUS): 41 ML/MIN/1.73M2
EOSINOPHIL # BLD AUTO: 0.9 K/UL (ref 0–0.5)
EOSINOPHIL NFR BLD AUTO: 5.7 % (ref 1–4)
ERYTHROCYTE [DISTWIDTH] IN BLOOD BY AUTOMATED COUNT: 18.1 % (ref 11.5–14.5)
GLOBULIN SER-MCNC: 3.9 G/DL (ref 2–4)
GLUCOSE SERPL-MCNC: 91 MG/DL (ref 74–106)
HCT VFR BLD AUTO: 20.4 % (ref 40–54)
HCT VFR BLD AUTO: 23.4 % (ref 40–54)
HCT VFR BLD AUTO: 24.3 % (ref 40–54)
HCT VFR BLD AUTO: 25.6 % (ref 40–54)
HCT VFR BLD AUTO: 25.8 % (ref 40–54)
HGB BLD-MCNC: 7.1 G/DL (ref 13.5–18)
HGB BLD-MCNC: 8 G/DL (ref 13.5–18)
HGB BLD-MCNC: 8.2 G/DL (ref 13.5–18)
HGB BLD-MCNC: 8.8 G/DL (ref 13.5–18)
HGB BLD-MCNC: 8.8 G/DL (ref 13.5–18)
IMM GRANULOCYTES # BLD AUTO: 0.27 K/UL (ref 0–0.04)
IMM GRANULOCYTES NFR BLD: 1.7 % (ref 0–0.4)
INR BLD: 1.85
LYMPHOCYTES # BLD AUTO: 2.43 K/UL (ref 1–4.8)
LYMPHOCYTES NFR BLD AUTO: 15.5 % (ref 27–41)
MCH RBC QN AUTO: 29.8 PG (ref 27–31)
MCHC RBC AUTO-ENTMCNC: 33.7 G/DL (ref 32–36)
MCV RBC AUTO: 88.4 FL (ref 80–96)
MONOCYTES # BLD AUTO: 2.64 K/UL (ref 0–0.8)
MONOCYTES NFR BLD AUTO: 16.8 % (ref 2–6)
MPC BLD CALC-MCNC: 12.1 FL (ref 9.4–12.4)
NEUTROPHILS # BLD AUTO: 9.32 K/UL (ref 1.8–7.7)
NEUTROPHILS NFR BLD AUTO: 59.3 % (ref 53–65)
NRBC # BLD AUTO: 0.02 X10E3/UL
NRBC, AUTO (.00): 0.1 %
PLATELET # BLD AUTO: 95 K/UL (ref 150–400)
POTASSIUM SERPL-SCNC: 3.9 MMOL/L (ref 3.5–5.1)
PROT SERPL-MCNC: 5.6 G/DL (ref 6.4–8.2)
PROTHROMBIN TIME: 21 SECONDS (ref 11.7–14.7)
RBC # BLD AUTO: 2.75 M/UL (ref 4.6–6.2)
SODIUM SERPL-SCNC: 128 MMOL/L (ref 136–145)
UNIT NUMBER: NORMAL
WBC # BLD AUTO: 15.71 K/UL (ref 4.5–11)

## 2023-09-01 PROCEDURE — P9016 RBC LEUKOCYTES REDUCED: HCPCS

## 2023-09-01 PROCEDURE — 85014 HEMATOCRIT: CPT

## 2023-09-01 PROCEDURE — 85610 PROTHROMBIN TIME: CPT | Performed by: STUDENT IN AN ORGANIZED HEALTH CARE EDUCATION/TRAINING PROGRAM

## 2023-09-01 PROCEDURE — 25000003 PHARM REV CODE 250: Performed by: STUDENT IN AN ORGANIZED HEALTH CARE EDUCATION/TRAINING PROGRAM

## 2023-09-01 PROCEDURE — 63600175 PHARM REV CODE 636 W HCPCS: Performed by: STUDENT IN AN ORGANIZED HEALTH CARE EDUCATION/TRAINING PROGRAM

## 2023-09-01 PROCEDURE — 80053 COMPREHEN METABOLIC PANEL: CPT | Performed by: STUDENT IN AN ORGANIZED HEALTH CARE EDUCATION/TRAINING PROGRAM

## 2023-09-01 PROCEDURE — C9113 INJ PANTOPRAZOLE SODIUM, VIA: HCPCS | Performed by: INTERNAL MEDICINE

## 2023-09-01 PROCEDURE — 25000003 PHARM REV CODE 250

## 2023-09-01 PROCEDURE — 99233 PR SUBSEQUENT HOSPITAL CARE,LEVL III: ICD-10-PCS | Mod: GC,,, | Performed by: FAMILY MEDICINE

## 2023-09-01 PROCEDURE — 63600175 PHARM REV CODE 636 W HCPCS: Performed by: INTERNAL MEDICINE

## 2023-09-01 PROCEDURE — 99233 SBSQ HOSP IP/OBS HIGH 50: CPT | Mod: GC,,, | Performed by: FAMILY MEDICINE

## 2023-09-01 PROCEDURE — 11000001 HC ACUTE MED/SURG PRIVATE ROOM

## 2023-09-01 PROCEDURE — 85014 HEMATOCRIT: CPT | Performed by: STUDENT IN AN ORGANIZED HEALTH CARE EDUCATION/TRAINING PROGRAM

## 2023-09-01 PROCEDURE — 85025 COMPLETE CBC W/AUTO DIFF WBC: CPT | Performed by: STUDENT IN AN ORGANIZED HEALTH CARE EDUCATION/TRAINING PROGRAM

## 2023-09-01 RX ORDER — HYDROCODONE BITARTRATE AND ACETAMINOPHEN 500; 5 MG/1; MG/1
TABLET ORAL
Status: DISCONTINUED | OUTPATIENT
Start: 2023-09-01 | End: 2023-09-08

## 2023-09-01 RX ADMIN — FUROSEMIDE 20 MG: 10 INJECTION, SOLUTION INTRAVENOUS at 09:09

## 2023-09-01 RX ADMIN — LACTULOSE 10 G: 20 SOLUTION ORAL at 08:09

## 2023-09-01 RX ADMIN — FUROSEMIDE 20 MG: 10 INJECTION, SOLUTION INTRAVENOUS at 08:09

## 2023-09-01 RX ADMIN — THIAMINE HYDROCHLORIDE 500 MG: 100 INJECTION, SOLUTION INTRAMUSCULAR; INTRAVENOUS at 09:09

## 2023-09-01 RX ADMIN — THIAMINE HYDROCHLORIDE 500 MG: 100 INJECTION, SOLUTION INTRAMUSCULAR; INTRAVENOUS at 08:09

## 2023-09-01 RX ADMIN — RIFAXIMIN 550 MG: 550 TABLET ORAL at 09:09

## 2023-09-01 RX ADMIN — PROPRANOLOL HYDROCHLORIDE 10 MG: 10 TABLET ORAL at 09:09

## 2023-09-01 RX ADMIN — PROPRANOLOL HYDROCHLORIDE 10 MG: 10 TABLET ORAL at 08:09

## 2023-09-01 RX ADMIN — SODIUM CHLORIDE: 9 INJECTION, SOLUTION INTRAVENOUS at 12:09

## 2023-09-01 RX ADMIN — FOLIC ACID 1 MG: 1 TABLET ORAL at 08:09

## 2023-09-01 RX ADMIN — OXYCODONE AND ACETAMINOPHEN 1 TABLET: 10; 325 TABLET ORAL at 08:09

## 2023-09-01 RX ADMIN — SPIRONOLACTONE 25 MG: 25 TABLET ORAL at 08:09

## 2023-09-01 RX ADMIN — PANTOPRAZOLE SODIUM 40 MG: 40 INJECTION, POWDER, FOR SOLUTION INTRAVENOUS at 08:09

## 2023-09-01 RX ADMIN — THIAMINE HYDROCHLORIDE 500 MG: 100 INJECTION, SOLUTION INTRAMUSCULAR; INTRAVENOUS at 03:09

## 2023-09-01 RX ADMIN — RIFAXIMIN 550 MG: 550 TABLET ORAL at 08:09

## 2023-09-01 NOTE — ASSESSMENT & PLAN NOTE
Likely secondary to liver cirrhosis    Per Dr. Gusman:  Active arterial bleeding on endoscopy in anal canal; too friable for clip. (In OR) Arterial bleeding at the 9 o'clock position in the anal canal; multiple bleeding hemorrhoidal plexus at 6 o'clock position on the posterior canal and at 3 o'clock position    Patient received 2 units of packed red blood cells in the OR and we will give FFP     total units of blood received 6 units.   8/25: No bleeding overnight  8/26/23 no obvious bleeding on rectal dressing    8/27: Rectal bleeding noted this morning. Rectal dressing/pad saturated with blod and clots. 1U PRBC ordered.   8/30: rectal bleeding this morning one unit of RBC ordered.   8/31: rectal bleeding this morning one unit of RBC ordered.   9/1: 1 unit of blood over night

## 2023-09-01 NOTE — PROGRESS NOTES
[facility] Hospital Medicine  Progress Note    Patient Name: John Spears  MRN: [id number]  Patient Class: IP- Inpatient   Admission Date: 8/19/2023  Length of Stay: 12 days  Attending Physician: Katherine Horton DO  Primary Care Provider: Romel Miranda        Subjective:     Principal Problem:Bleeding hemorrhoid        HPI:  Patient is a 47 y/o male with PMH of HTN, HLD, cirrhosis of the liver and hemorrhoids who presents to the ED with complaint of COB and swelling of the abdomen, legs and feet that started about 1 month ago. Patient states that the swelling and SOB has been worsening for the last 2 weeks. Patient reports passing dark blood per rectum with bowel movement for the last 2 weeks. Patient reports having intermittent RUQ when he eats fried foot. Patient reports chills but no fever. Patient denies nausea, vomiting, chest pain or urinary changes. Patient reports drinking about 10 beers of 16 ounces per day for the 3 years and for the last months he stopped drinking beer and started drinking  liquor. He states that 1 fifth of liquor lasts for 3 days. Patient reports withdrawal symptoms in the past. Patient reports that he quit smoking cigarettes 5 years ago.      In the ED showed /67, HR 90, RR 16, SpO2 98% and afebrile. Blood work showed H/H 6.1/19, WBC 5.6, PLT 78, PT 21.2, INR 1.87, PTT 48.2. Sodium 134, potassium 2.8, Alkaline phosphatase 166, Total bilirrubin 6.5, AST/ALST 61/41, p-, troponin 16.7. UA negative for infection or blood. FOBT negative. EKG nsr with HR 89. Patient is receiving PRBC in the ER at this moment and electrolytes are being replenished. Patient will be admitted to the hospital for further management.       Overview/Hospital Course:  No notes on file    Interval History: Patient is overall better this morning. He did require a transfusion last night. Still waiting out on [facility] for transfer to [facility] of care.      Review of Systems   Constitutional:  Positive for activity change. Negative for fatigue.   Respiratory:  Negative for chest tightness and shortness of breath.    Cardiovascular:  Positive for leg swelling. Negative for chest pain.   Gastrointestinal:  Positive for abdominal distention and anal bleeding.   Skin:  Negative for rash and wound.   Neurological:  Negative for tremors.     Objective:     Vital Signs (Most Recent):  Temp: 98.4 °F (36.9 °C) (09/01/23 1100)  Pulse: 72 (09/01/23 1100)  Resp: 18 (09/01/23 1100)  BP: 130/65 (09/01/23 1100)  SpO2: 100 % (09/01/23 0710) Vital Signs (24h Range):  Temp:  [96.9 °F (36.1 °C)-98.4 °F (36.9 °C)] 98.4 °F (36.9 °C)  Pulse:  [67-72] 72  Resp:  [12-20] 18  SpO2:  [98 %-100 %] 100 %  BP: ()/(55-74) 130/65     Weight: (!) 149.7 kg (330 lb 0.5 oz)  Body mass index is 54.92 kg/m².  No intake or output data in the 24 hours ending 09/01/23 1400      Physical Exam  Vitals and nursing note reviewed.   Constitutional:       Appearance: He is obese. He is ill-appearing.      Comments: Pt is somnolent but aroused easily   HENT:      Head: Normocephalic.      Right Ear: External ear normal.      Left Ear: External ear normal.      Nose: Nose normal.      Mouth/Throat:      Mouth: Mucous membranes are moist.      Pharynx: Oropharynx is clear.   Eyes:      General: Scleral icterus present.   Cardiovascular:      Rate and Rhythm: Normal rate and regular rhythm.      Heart sounds: Normal heart sounds.   Pulmonary:      Breath sounds: Normal breath sounds.   Abdominal:      General: There is distension.   Musculoskeletal:      Right lower leg: Edema present.      Left lower leg: Edema present.   Skin:     General: Skin is warm and dry.      Capillary Refill: Capillary refill takes less than 2 seconds.   Neurological:      Mental Status: He is oriented to person, place, and time.             Significant Labs: All pertinent labs within the past 24 hours have been  reviewed.    Significant Imaging: I have reviewed all pertinent imaging results/findings within the past 24 hours.      Assessment/Plan:      * Bleeding hemorrhoid  Likely secondary to liver cirrhosis    Per Dr. Gusman:  Active arterial bleeding on endoscopy in anal canal; too friable for clip. (In OR) Arterial bleeding at the 9 o'clock position in the anal canal; multiple bleeding hemorrhoidal plexus at 6 o'clock position on the posterior canal and at 3 o'clock position    Patient received 2 units of packed red blood cells in the OR and we will give FFP     total units of blood received 6 units.   8/25: No bleeding overnight  8/26/23 no obvious bleeding on rectal dressing    8/27: Rectal bleeding noted this morning. Rectal dressing/pad saturated with blod and clots. 1U PRBC ordered.   8/30: rectal bleeding this morning one unit of RBC ordered.   8/31: rectal bleeding this morning one unit of RBC ordered.   9/1: 1 unit of blood over night     Hyperammonemia  8/27: Continue with rifaxmin  Started on lactulose on 8/26  Ammonia level 52      Acute lower GI bleeding    Patient received 2 units of packed red blood cells in the OR and we will give FFP  8/25: Hgb 7.8  8/26/23 no obvious rectal bleeding  8/30: 1 unit of blood ordered.   8/31: 1 unit of blood   9/1; 1 unit of blood overnight    Hypertension  Monitor BP    Alcoholic cirrhosis of liver  MELD score 22 points. 19.6% with estimated 3 month mortality  CIWA score 0    MRCP reveals cirrhosis and multiple gallstones      8/27: Pt is noted to have scleral icterus, bili 5.5, and continued hematochezia.   Put in for transfer ([facility] order) for higher level of care. At 13:25, [facility] has no beds available, per Kaylee Perales RN. At 13:56, [facility] per Dr. Riggs has no available beds & is on diversion.  Call [facility] on 8/28 to see if any beds are available [phone]   8/30: pending transfer from [facility].   8/31: pending transfer.   9/1: pending transfer      Anemia  Acute blood loss        Per significant other, had hematochezia 3x this morning. Noted pad saturated with blood and clots.  Hgb 7.2. 1U PRBC noted. Dr. Mahajan informed.   Monitor CBC  8/30: Hgb in morning was 6.1, one unit of Rbc ordered on noon check it was 7.2 will get H/H q 6hr, will continue to monitor and transfuse appropriately, will hopefully transfer soon.   8/31: Hgb this morning was 6.1, one unit of RBC ordered, at noon H/H was 7.3 will continue to trend H/H  9/1: Hgb 8.8 this morning required one unit of RBC last night.     Hypoalbuminemia  Likely from malturition + or - synthetic function of the liver  C/w Spironolactone and lasix      VTE Risk Mitigation (From admission, onward)         Ordered     Reason for No Pharmacological VTE Prophylaxis  Once        Question:  Reasons:  Answer:  Active Bleeding    08/20/23 0043     IP VTE HIGH RISK PATIENT  Once         08/20/23 0043     Place sequential compression device  Until discontinued         08/20/23 0043                Discharge Planning   LUCRECIA:      Code Status: Full Code   Is the patient medically ready for discharge?:     Reason for patient still in hospital (select all that apply): Treatment  Discharge Plan A: Home with family                  Zach Morales DO  Department of Hospital Medicine   [facility]

## 2023-09-01 NOTE — ASSESSMENT & PLAN NOTE
MELD score 22 points. 19.6% with estimated 3 month mortality  CIWA score 0    MRCP reveals cirrhosis and multiple gallstones      8/27: Pt is noted to have scleral icterus, bili 5.5, and continued hematochezia.   Put in for transfer ([facility] order) for higher level of care. At 13:25, [facility] has no beds available, per Kaylee Perales RN. At 13:56, [facility] per Dr. Riggs has no available beds & is on diversion.  Call [facility] on 8/28 to see if any beds are available [phone]   8/30: pending transfer from [facility].   8/31: pending transfer.   9/1: pending transfer

## 2023-09-01 NOTE — ASSESSMENT & PLAN NOTE
Patient received 2 units of packed red blood cells in the OR and we will give FFP  8/25: Hgb 7.8  8/26/23 no obvious rectal bleeding  8/30: 1 unit of blood ordered.   8/31: 1 unit of blood   9/1; 1 unit of blood overnight

## 2023-09-01 NOTE — SUBJECTIVE & OBJECTIVE
Interval History: Patient is overall better this morning. He did require a transfusion last night. Still waiting out on [facility] for transfer to [facility] level of care.     Review of Systems   Constitutional:  Positive for activity change. Negative for fatigue.   Respiratory:  Negative for chest tightness and shortness of breath.    Cardiovascular:  Positive for leg swelling. Negative for chest pain.   Gastrointestinal:  Positive for abdominal distention and anal bleeding.   Skin:  Negative for rash and wound.   Neurological:  Negative for tremors.     Objective:     Vital Signs (Most Recent):  Temp: 98.4 °F (36.9 °C) (09/01/23 1100)  Pulse: 72 (09/01/23 1100)  Resp: 18 (09/01/23 1100)  BP: 130/65 (09/01/23 1100)  SpO2: 100 % (09/01/23 0710) Vital Signs (24h Range):  Temp:  [96.9 °F (36.1 °C)-98.4 °F (36.9 °C)] 98.4 °F (36.9 °C)  Pulse:  [67-72] 72  Resp:  [12-20] 18  SpO2:  [98 %-100 %] 100 %  BP: ()/(55-74) 130/65     Weight: (!) 149.7 kg (330 lb 0.5 oz)  Body mass index is 54.92 kg/m².  No intake or output data in the 24 hours ending 09/01/23 1400      Physical Exam  Vitals and nursing note reviewed.   Constitutional:       Appearance: He is obese. He is ill-appearing.      Comments: Pt is somnolent but aroused easily   HENT:      Head: Normocephalic.      Right Ear: External ear normal.      Left Ear: External ear normal.      Nose: Nose normal.      Mouth/Throat:      Mouth: Mucous membranes are moist.      Pharynx: Oropharynx is clear.   Eyes:      General: Scleral icterus present.   Cardiovascular:      Rate and Rhythm: Normal rate and regular rhythm.      Heart sounds: Normal heart sounds.   Pulmonary:      Breath sounds: Normal breath sounds.   Abdominal:      General: There is distension.   Musculoskeletal:      Right lower leg: Edema present.      Left lower leg: Edema present.   Skin:     General: Skin is warm and dry.      Capillary Refill: Capillary refill takes less than 2 seconds.   Neurological:       Mental Status: He is oriented to person, place, and time.             Significant Labs: All pertinent labs within the past 24 hours have been reviewed.    Significant Imaging: I have reviewed all pertinent imaging results/findings within the past 24 hours.

## 2023-09-01 NOTE — ASSESSMENT & PLAN NOTE
Acute blood loss       Per significant other, had hematochezia 3x this morning. Noted pad saturated with blood and clots.  Hgb 7.2. 1U PRBC noted. Dr. Mahajan informed.   Monitor CBC  8/30: Hgb in morning was 6.1, one unit of Rbc ordered on noon check it was 7.2 will get H/H q 6hr, will continue to monitor and transfuse appropriately, will hopefully transfer soon.   8/31: Hgb this morning was 6.1, one unit of RBC ordered, at noon H/H was 7.3 will continue to trend H/H  9/1: Hgb 8.8 this morning required one unit of RBC last night.

## 2023-09-02 LAB
ABO + RH BLD: NORMAL
ALBUMIN SERPL BCP-MCNC: 1.6 G/DL (ref 3.5–5)
ALBUMIN/GLOB SERPL: 0.4 {RATIO}
ALP SERPL-CCNC: 120 U/L (ref 45–115)
ALT SERPL W P-5'-P-CCNC: 44 U/L (ref 16–61)
ANION GAP SERPL CALCULATED.3IONS-SCNC: 13 MMOL/L (ref 7–16)
AST SERPL W P-5'-P-CCNC: 66 U/L (ref 15–37)
BILIRUB SERPL-MCNC: 6.2 MG/DL (ref ?–1.2)
BLD PROD TYP BPU: NORMAL
BLOOD UNIT EXPIRATION DATE: NORMAL
BLOOD UNIT TYPE CODE: 5100
BUN SERPL-MCNC: 32 MG/DL (ref 7–18)
BUN/CREAT SERPL: 17 (ref 6–20)
CALCIUM SERPL-MCNC: 7.6 MG/DL (ref 8.5–10.1)
CHLORIDE SERPL-SCNC: 98 MMOL/L (ref 98–107)
CO2 SERPL-SCNC: 23 MMOL/L (ref 21–32)
CREAT SERPL-MCNC: 1.88 MG/DL (ref 0.7–1.3)
CROSSMATCH INTERPRETATION: NORMAL
DISPENSE STATUS: NORMAL
EGFR (NO RACE VARIABLE) (RUSH/TITUS): 43 ML/MIN/1.73M2
GLOBULIN SER-MCNC: 3.9 G/DL (ref 2–4)
GLUCOSE SERPL-MCNC: 89 MG/DL (ref 74–106)
HCT VFR BLD AUTO: 21.5 % (ref 40–54)
HCT VFR BLD AUTO: 21.6 % (ref 40–54)
HCT VFR BLD AUTO: 21.7 % (ref 40–54)
HGB BLD-MCNC: 7.2 G/DL (ref 13.5–18)
HGB BLD-MCNC: 7.3 G/DL (ref 13.5–18)
HGB BLD-MCNC: 7.5 G/DL (ref 13.5–18)
INR BLD: 1.88
POTASSIUM SERPL-SCNC: 3.9 MMOL/L (ref 3.5–5.1)
PROT SERPL-MCNC: 5.5 G/DL (ref 6.4–8.2)
PROTHROMBIN TIME: 21.3 SECONDS (ref 11.7–14.7)
SODIUM SERPL-SCNC: 130 MMOL/L (ref 136–145)
UNIT NUMBER: NORMAL

## 2023-09-02 PROCEDURE — 25000003 PHARM REV CODE 250: Performed by: STUDENT IN AN ORGANIZED HEALTH CARE EDUCATION/TRAINING PROGRAM

## 2023-09-02 PROCEDURE — P9017 PLASMA 1 DONOR FRZ W/IN 8 HR: HCPCS

## 2023-09-02 PROCEDURE — 25000003 PHARM REV CODE 250

## 2023-09-02 PROCEDURE — 63600175 PHARM REV CODE 636 W HCPCS: Performed by: STUDENT IN AN ORGANIZED HEALTH CARE EDUCATION/TRAINING PROGRAM

## 2023-09-02 PROCEDURE — 63600175 PHARM REV CODE 636 W HCPCS: Performed by: INTERNAL MEDICINE

## 2023-09-02 PROCEDURE — C9113 INJ PANTOPRAZOLE SODIUM, VIA: HCPCS | Performed by: INTERNAL MEDICINE

## 2023-09-02 PROCEDURE — 85014 HEMATOCRIT: CPT

## 2023-09-02 PROCEDURE — 63600175 PHARM REV CODE 636 W HCPCS

## 2023-09-02 PROCEDURE — 80053 COMPREHEN METABOLIC PANEL: CPT | Performed by: STUDENT IN AN ORGANIZED HEALTH CARE EDUCATION/TRAINING PROGRAM

## 2023-09-02 PROCEDURE — 85610 PROTHROMBIN TIME: CPT | Performed by: STUDENT IN AN ORGANIZED HEALTH CARE EDUCATION/TRAINING PROGRAM

## 2023-09-02 PROCEDURE — 99233 SBSQ HOSP IP/OBS HIGH 50: CPT | Mod: GC,,, | Performed by: FAMILY MEDICINE

## 2023-09-02 PROCEDURE — 99233 PR SUBSEQUENT HOSPITAL CARE,LEVL III: ICD-10-PCS | Mod: GC,,, | Performed by: FAMILY MEDICINE

## 2023-09-02 PROCEDURE — 11000001 HC ACUTE MED/SURG PRIVATE ROOM

## 2023-09-02 RX ORDER — HYDROCODONE BITARTRATE AND ACETAMINOPHEN 500; 5 MG/1; MG/1
TABLET ORAL
Status: DISCONTINUED | OUTPATIENT
Start: 2023-09-02 | End: 2023-09-08

## 2023-09-02 RX ADMIN — FUROSEMIDE 20 MG: 10 INJECTION, SOLUTION INTRAVENOUS at 10:09

## 2023-09-02 RX ADMIN — THIAMINE HYDROCHLORIDE 500 MG: 100 INJECTION, SOLUTION INTRAMUSCULAR; INTRAVENOUS at 02:09

## 2023-09-02 RX ADMIN — PROPRANOLOL HYDROCHLORIDE 10 MG: 10 TABLET ORAL at 08:09

## 2023-09-02 RX ADMIN — OXYCODONE AND ACETAMINOPHEN 1 TABLET: 10; 325 TABLET ORAL at 08:09

## 2023-09-02 RX ADMIN — SPIRONOLACTONE 25 MG: 25 TABLET ORAL at 09:09

## 2023-09-02 RX ADMIN — RIFAXIMIN 550 MG: 550 TABLET ORAL at 09:09

## 2023-09-02 RX ADMIN — LACTULOSE 10 G: 20 SOLUTION ORAL at 09:09

## 2023-09-02 RX ADMIN — FUROSEMIDE 20 MG: 10 INJECTION, SOLUTION INTRAVENOUS at 09:09

## 2023-09-02 RX ADMIN — THIAMINE HYDROCHLORIDE 500 MG: 100 INJECTION, SOLUTION INTRAMUSCULAR; INTRAVENOUS at 10:09

## 2023-09-02 RX ADMIN — PROPRANOLOL HYDROCHLORIDE 10 MG: 10 TABLET ORAL at 09:09

## 2023-09-02 RX ADMIN — OXYCODONE AND ACETAMINOPHEN 1 TABLET: 10; 325 TABLET ORAL at 09:09

## 2023-09-02 RX ADMIN — HYDROMORPHONE HYDROCHLORIDE 1 MG: 2 INJECTION, SOLUTION INTRAMUSCULAR; INTRAVENOUS; SUBCUTANEOUS at 02:09

## 2023-09-02 RX ADMIN — PHYTONADIONE 5 MG: 10 INJECTION, EMULSION INTRAMUSCULAR; INTRAVENOUS; SUBCUTANEOUS at 08:09

## 2023-09-02 RX ADMIN — RIFAXIMIN 550 MG: 550 TABLET ORAL at 08:09

## 2023-09-02 RX ADMIN — THIAMINE HYDROCHLORIDE 500 MG: 100 INJECTION, SOLUTION INTRAMUSCULAR; INTRAVENOUS at 09:09

## 2023-09-02 RX ADMIN — FOLIC ACID 1 MG: 1 TABLET ORAL at 09:09

## 2023-09-02 RX ADMIN — PANTOPRAZOLE SODIUM 40 MG: 40 INJECTION, POWDER, FOR SOLUTION INTRAVENOUS at 09:09

## 2023-09-02 NOTE — ASSESSMENT & PLAN NOTE
MELD score 22 points. 19.6% with estimated 3 month mortality  CIWA score 0    MRCP reveals cirrhosis and multiple gallstones      8/27: Pt is noted to have scleral icterus, bili 5.5, and continued hematochezia.   Put in for transfer ([facility] order) for higher level of care. At 13:25, [facility] has no beds available, per Kaylee Perales RN. At 13:56, [facility] per Dr. Riggs has no available beds & is on diversion.  Call [facility] on 8/28 to see if any beds are available [phone]   8/30: pending transfer from [facility].   8/31: pending transfer.   9/1: pending transfer

## 2023-09-02 NOTE — PROGRESS NOTES
[facility] Hospital Medicine  Progress Note    Patient Name: John Spears  MRN: [id number]  Patient Class: IP- Inpatient   Admission Date: 8/19/2023  Length of Stay: 13 days  Attending Physician: Katherine Horton DO  Primary Care Provider: Romel Miranda        Subjective:     Principal Problem:Bleeding hemorrhoid        HPI:  Patient is a 49 y/o male with PMH of HTN, HLD, cirrhosis of the liver and hemorrhoids who presents to the ED with complaint of COB and swelling of the abdomen, legs and feet that started about 1 month ago. Patient states that the swelling and SOB has been worsening for the last 2 weeks. Patient reports passing dark blood per rectum with bowel movement for the last 2 weeks. Patient reports having intermittent RUQ when he eats fried foot. Patient reports chills but no fever. Patient denies nausea, vomiting, chest pain or urinary changes. Patient reports drinking about 10 beers of 16 ounces per day for the 3 years and for the last months he stopped drinking beer and started drinking  liquor. He states that 1 fifth of liquor lasts for 3 days. Patient reports withdrawal symptoms in the past. Patient reports that he quit smoking cigarettes 5 years ago.      In the ED showed /67, HR 90, RR 16, SpO2 98% and afebrile. Blood work showed H/H 6.1/19, WBC 5.6, PLT 78, PT 21.2, INR 1.87, PTT 48.2. Sodium 134, potassium 2.8, Alkaline phosphatase 166, Total bilirrubin 6.5, AST/ALST 61/41, p-, troponin 16.7. UA negative for infection or blood. FOBT negative. EKG nsr with HR 89. Patient is receiving PRBC in the ER at this moment and electrolytes are being replenished. Patient will be admitted to the hospital for further management.       Overview/Hospital Course:  No notes on file    Interval History: Patient was seen and examined in bed with no acute event overnight.His condition remains grossly unchanged. He complained of some bleeding per rectum  this morning, but H&H remains stable at 7.3/21.6. As per family member, the patient has a new insurance policy effective on 9/1/2023, but they will bring the information and provide us with the policy number for the transfer process. Otherwise, the patient stated that he feels ok,    Review of Systems   Constitutional:  Positive for activity change. Negative for fatigue.   Respiratory:  Negative for chest tightness and shortness of breath.    Cardiovascular:  Positive for leg swelling. Negative for chest pain.   Gastrointestinal:  Positive for abdominal distention and anal bleeding.   Skin:  Negative for rash and wound.   Neurological:  Negative for tremors.     Objective:     Vital Signs (Most Recent):  Temp: 98.2 °F (36.8 °C) (09/02/23 1606)  Pulse: 79 (09/02/23 1606)  Resp: 18 (09/02/23 1606)  BP: 139/66 (09/02/23 1606)  SpO2: 99 % (09/02/23 1606) Vital Signs (24h Range):  Temp:  [98 °F (36.7 °C)-98.7 °F (37.1 °C)] 98.2 °F (36.8 °C)  Pulse:  [] 79  Resp:  [14-19] 18  SpO2:  [95 %-99 %] 99 %  BP: (120-139)/(58-70) 139/66     Weight: (!) 149.7 kg (330 lb 0.5 oz)  Body mass index is 54.92 kg/m².    Intake/Output Summary (Last 24 hours) at 9/2/2023 1714  Last data filed at 9/2/2023 0926  Gross per 24 hour   Intake --   Output 8 ml   Net -8 ml         Physical Exam  Vitals and nursing note reviewed.   Constitutional:       Appearance: He is obese. He is ill-appearing.      Comments: Pt is somnolent but aroused easily   HENT:      Head: Normocephalic.      Right Ear: External ear normal.      Left Ear: External ear normal.      Nose: Nose normal.      Mouth/Throat:      Mouth: Mucous membranes are moist.      Pharynx: Oropharynx is clear.   Eyes:      General: Scleral icterus present.   Cardiovascular:      Rate and Rhythm: Normal rate and regular rhythm.      Heart sounds: Normal heart sounds.   Pulmonary:      Breath sounds: Normal breath sounds.   Abdominal:      General: There is distension.    Musculoskeletal:      Right lower leg: Edema present.      Left lower leg: Edema present.   Skin:     General: Skin is warm and dry.      Capillary Refill: Capillary refill takes less than 2 seconds.   Neurological:      Mental Status: He is oriented to person, place, and time.             Significant Labs: All pertinent labs within the past 24 hours have been reviewed.    Significant Imaging: I have reviewed all pertinent imaging results/findings within the past 24 hours.      Assessment/Plan:      * Bleeding hemorrhoid  Likely secondary to liver cirrhosis    Per Dr. Gusman:  Active arterial bleeding on endoscopy in anal canal; too friable for clip. (In OR) Arterial bleeding at the 9 o'clock position in the anal canal; multiple bleeding hemorrhoidal plexus at 6 o'clock position on the posterior canal and at 3 o'clock position    Patient received 2 units of packed red blood cells in the OR and we will give FFP     total units of blood received 6 units.   8/25: No bleeding overnight  8/26/23 no obvious bleeding on rectal dressing    8/27: Rectal bleeding noted this morning. Rectal dressing/pad saturated with blod and clots. 1U PRBC ordered.   8/30: rectal bleeding this morning one unit of RBC ordered.   8/31: rectal bleeding this morning one unit of RBC ordered.   9/1: 1 unit of blood over night     Hyperammonemia  8/27: Continue with rifaxmin  Started on lactulose on 8/26  Ammonia level 52      Acute lower GI bleeding    Patient received 2 units of packed red blood cells in the OR and we will give FFP  8/25: Hgb 7.8  8/26/23 no obvious rectal bleeding  8/30: 1 unit of blood ordered.   8/31: 1 unit of blood   9/1; 1 unit of blood overnight    Hypertension  Monitor BP    Alcoholic cirrhosis of liver  MELD score 22 points. 19.6% with estimated 3 month mortality  CIWA score 0    MRCP reveals cirrhosis and multiple gallstones      8/27: Pt is noted to have scleral icterus, bili 5.5, and continued hematochezia.   Put  in for transfer ([facility] order) for higher level of care. At 13:25, [facility] has no beds available, per Kaylee Perales RN. At 13:56, [facility] per Dr. Riggs has no available beds & is on diversion.  Call [facility] on 8/28 to see if any beds are available [phone]   8/30: pending transfer from [facility].   8/31: pending transfer.   9/1: pending transfer      Anemia  Acute blood loss       Per significant other, had hematochezia 3x this morning. Noted pad saturated with blood and clots.  Hgb 7.2. 1U PRBC noted. Dr. Mahajan informed.   Monitor CBC  8/30: Hgb in morning was 6.1, one unit of Rbc ordered on noon check it was 7.2 will get H/H q 6hr, will continue to monitor and transfuse appropriately, will hopefully transfer soon.   8/31: Hgb this morning was 6.1, one unit of RBC ordered, at noon H/H was 7.3 will continue to trend H/H  9/1: Hgb 8.8 this morning required one unit of RBC last night.     Hypoalbuminemia  Likely from malturition + or - synthetic function of the liver  C/w Spironolactone and lasix    VTE Risk Mitigation (From admission, onward)         Ordered     Reason for No Pharmacological VTE Prophylaxis  Once        Question:  Reasons:  Answer:  Active Bleeding    08/20/23 0043     IP VTE HIGH RISK PATIENT  Once         08/20/23 0043     Place sequential compression device  Until discontinued         08/20/23 0043                Discharge Planning   LUCRECIA:      Code Status: Full Code   Is the patient medically ready for discharge?:     Reason for patient still in hospital (select all that apply): Treatment  Discharge Plan A: Home with family                  Luther Carvalho MD  Department of Hospital Medicine   [facility]

## 2023-09-02 NOTE — SUBJECTIVE & OBJECTIVE
Interval History: Patient was seen and examined in bed with no acute event overnight.His condition remains grossly unchanged. He complained of some bleeding per rectum this morning, but H&H remains stable at 7.3/21.6. As per family member, the patient has a new insurance policy effective on 9/1/2023, but they will bring the information and provide us with the policy number for the transfer process. Otherwise, the patient stated that he feels ok,    Review of Systems   Constitutional:  Positive for activity change. Negative for fatigue.   Respiratory:  Negative for chest tightness and shortness of breath.    Cardiovascular:  Positive for leg swelling. Negative for chest pain.   Gastrointestinal:  Positive for abdominal distention and anal bleeding.   Skin:  Negative for rash and wound.   Neurological:  Negative for tremors.     Objective:     Vital Signs (Most Recent):  Temp: 98.2 °F (36.8 °C) (09/02/23 1606)  Pulse: 79 (09/02/23 1606)  Resp: 18 (09/02/23 1606)  BP: 139/66 (09/02/23 1606)  SpO2: 99 % (09/02/23 1606) Vital Signs (24h Range):  Temp:  [98 °F (36.7 °C)-98.7 °F (37.1 °C)] 98.2 °F (36.8 °C)  Pulse:  [] 79  Resp:  [14-19] 18  SpO2:  [95 %-99 %] 99 %  BP: (120-139)/(58-70) 139/66     Weight: (!) 149.7 kg (330 lb 0.5 oz)  Body mass index is 54.92 kg/m².    Intake/Output Summary (Last 24 hours) at 9/2/2023 1718  Last data filed at 9/2/2023 0926  Gross per 24 hour   Intake --   Output 8 ml   Net -8 ml         Physical Exam  Vitals and nursing note reviewed.   Constitutional:       Appearance: He is obese. He is ill-appearing.      Comments: Pt is somnolent but aroused easily   HENT:      Head: Normocephalic.      Right Ear: External ear normal.      Left Ear: External ear normal.      Nose: Nose normal.      Mouth/Throat:      Mouth: Mucous membranes are moist.      Pharynx: Oropharynx is clear.   Eyes:      General: Scleral icterus present.   Cardiovascular:      Rate and Rhythm: Normal rate and regular  rhythm.      Heart sounds: Normal heart sounds.   Pulmonary:      Breath sounds: Normal breath sounds.   Abdominal:      General: There is distension.   Musculoskeletal:      Right lower leg: Edema present.      Left lower leg: Edema present.   Skin:     General: Skin is warm and dry.      Capillary Refill: Capillary refill takes less than 2 seconds.   Neurological:      Mental Status: He is oriented to person, place, and time.             Significant Labs: All pertinent labs within the past 24 hours have been reviewed.    Significant Imaging: I have reviewed all pertinent imaging results/findings within the past 24 hours.

## 2023-09-03 LAB
ABO + RH BLD: NORMAL
ABO + RH BLD: NORMAL
ALBUMIN SERPL BCP-MCNC: 1.8 G/DL (ref 3.5–5)
ALBUMIN/GLOB SERPL: 0.4 {RATIO}
ALP SERPL-CCNC: 129 U/L (ref 45–115)
ALT SERPL W P-5'-P-CCNC: 51 U/L (ref 16–61)
ANION GAP SERPL CALCULATED.3IONS-SCNC: 11 MMOL/L (ref 7–16)
AST SERPL W P-5'-P-CCNC: 71 U/L (ref 15–37)
BASOPHILS # BLD AUTO: 0.07 K/UL (ref 0–0.2)
BASOPHILS NFR BLD AUTO: 0.5 % (ref 0–1)
BILIRUB SERPL-MCNC: 5.3 MG/DL (ref ?–1.2)
BLD PROD TYP BPU: NORMAL
BLD PROD TYP BPU: NORMAL
BLOOD UNIT EXPIRATION DATE: NORMAL
BLOOD UNIT EXPIRATION DATE: NORMAL
BLOOD UNIT TYPE CODE: 5100
BLOOD UNIT TYPE CODE: 5100
BUN SERPL-MCNC: 27 MG/DL (ref 7–18)
BUN/CREAT SERPL: 16 (ref 6–20)
CALCIUM SERPL-MCNC: 7.7 MG/DL (ref 8.5–10.1)
CHLORIDE SERPL-SCNC: 97 MMOL/L (ref 98–107)
CO2 SERPL-SCNC: 27 MMOL/L (ref 21–32)
CREAT SERPL-MCNC: 1.71 MG/DL (ref 0.7–1.3)
CROSSMATCH INTERPRETATION: NORMAL
CROSSMATCH INTERPRETATION: NORMAL
DIFFERENTIAL METHOD BLD: ABNORMAL
DISPENSE STATUS: NORMAL
DISPENSE STATUS: NORMAL
EGFR (NO RACE VARIABLE) (RUSH/TITUS): 48 ML/MIN/1.73M2
EOSINOPHIL # BLD AUTO: 0.6 K/UL (ref 0–0.5)
EOSINOPHIL NFR BLD AUTO: 4.4 % (ref 1–4)
ERYTHROCYTE [DISTWIDTH] IN BLOOD BY AUTOMATED COUNT: 18.7 % (ref 11.5–14.5)
GLOBULIN SER-MCNC: 4.2 G/DL (ref 2–4)
GLUCOSE SERPL-MCNC: 133 MG/DL (ref 74–106)
HCT VFR BLD AUTO: 16.5 % (ref 40–54)
HCT VFR BLD AUTO: 21.7 % (ref 40–54)
HCT VFR BLD AUTO: 21.7 % (ref 40–54)
HCT VFR BLD AUTO: 24.5 % (ref 40–54)
HGB BLD-MCNC: 5.4 G/DL (ref 13.5–18)
HGB BLD-MCNC: 7.4 G/DL (ref 13.5–18)
HGB BLD-MCNC: 7.4 G/DL (ref 13.5–18)
HGB BLD-MCNC: 8.1 G/DL (ref 13.5–18)
IMM GRANULOCYTES # BLD AUTO: 0.14 K/UL (ref 0–0.04)
IMM GRANULOCYTES NFR BLD: 1 % (ref 0–0.4)
INDIRECT COOMBS: NORMAL
INR BLD: 1.77
LYMPHOCYTES # BLD AUTO: 1.31 K/UL (ref 1–4.8)
LYMPHOCYTES NFR BLD AUTO: 9.7 % (ref 27–41)
MCH RBC QN AUTO: 30.5 PG (ref 27–31)
MCHC RBC AUTO-ENTMCNC: 34.1 G/DL (ref 32–36)
MCV RBC AUTO: 89.3 FL (ref 80–96)
MONOCYTES # BLD AUTO: 1.85 K/UL (ref 0–0.8)
MONOCYTES NFR BLD AUTO: 13.7 % (ref 2–6)
MPC BLD CALC-MCNC: 11.3 FL (ref 9.4–12.4)
NEUTROPHILS # BLD AUTO: 9.57 K/UL (ref 1.8–7.7)
NEUTROPHILS NFR BLD AUTO: 70.7 % (ref 53–65)
NRBC # BLD AUTO: 0 X10E3/UL
NRBC, AUTO (.00): 0 %
PLATELET # BLD AUTO: 86 K/UL (ref 150–400)
POTASSIUM SERPL-SCNC: 3.8 MMOL/L (ref 3.5–5.1)
PROT SERPL-MCNC: 6 G/DL (ref 6.4–8.2)
PROTHROMBIN TIME: 20.3 SECONDS (ref 11.7–14.7)
RBC # BLD AUTO: 2.43 M/UL (ref 4.6–6.2)
RH BLD: NORMAL
SODIUM SERPL-SCNC: 131 MMOL/L (ref 136–145)
SPECIMEN OUTDATE: NORMAL
UNIT NUMBER: NORMAL
UNIT NUMBER: NORMAL
WBC # BLD AUTO: 13.54 K/UL (ref 4.5–11)

## 2023-09-03 PROCEDURE — C9113 INJ PANTOPRAZOLE SODIUM, VIA: HCPCS | Performed by: INTERNAL MEDICINE

## 2023-09-03 PROCEDURE — 25000003 PHARM REV CODE 250

## 2023-09-03 PROCEDURE — 99233 PR SUBSEQUENT HOSPITAL CARE,LEVL III: ICD-10-PCS | Mod: GC,,, | Performed by: FAMILY MEDICINE

## 2023-09-03 PROCEDURE — 86923 COMPATIBILITY TEST ELECTRIC: CPT | Mod: 91

## 2023-09-03 PROCEDURE — 25000003 PHARM REV CODE 250: Performed by: STUDENT IN AN ORGANIZED HEALTH CARE EDUCATION/TRAINING PROGRAM

## 2023-09-03 PROCEDURE — 85014 HEMATOCRIT: CPT

## 2023-09-03 PROCEDURE — 85610 PROTHROMBIN TIME: CPT | Performed by: STUDENT IN AN ORGANIZED HEALTH CARE EDUCATION/TRAINING PROGRAM

## 2023-09-03 PROCEDURE — 85025 COMPLETE CBC W/AUTO DIFF WBC: CPT | Performed by: STUDENT IN AN ORGANIZED HEALTH CARE EDUCATION/TRAINING PROGRAM

## 2023-09-03 PROCEDURE — 86900 BLOOD TYPING SEROLOGIC ABO: CPT

## 2023-09-03 PROCEDURE — 80053 COMPREHEN METABOLIC PANEL: CPT | Performed by: STUDENT IN AN ORGANIZED HEALTH CARE EDUCATION/TRAINING PROGRAM

## 2023-09-03 PROCEDURE — 36430 TRANSFUSION BLD/BLD COMPNT: CPT

## 2023-09-03 PROCEDURE — 63600175 PHARM REV CODE 636 W HCPCS: Performed by: INTERNAL MEDICINE

## 2023-09-03 PROCEDURE — 11000001 HC ACUTE MED/SURG PRIVATE ROOM

## 2023-09-03 PROCEDURE — P9016 RBC LEUKOCYTES REDUCED: HCPCS

## 2023-09-03 PROCEDURE — 63600175 PHARM REV CODE 636 W HCPCS: Performed by: STUDENT IN AN ORGANIZED HEALTH CARE EDUCATION/TRAINING PROGRAM

## 2023-09-03 PROCEDURE — 99233 SBSQ HOSP IP/OBS HIGH 50: CPT | Mod: GC,,, | Performed by: FAMILY MEDICINE

## 2023-09-03 RX ORDER — HYDROCODONE BITARTRATE AND ACETAMINOPHEN 500; 5 MG/1; MG/1
TABLET ORAL
Status: DISCONTINUED | OUTPATIENT
Start: 2023-09-03 | End: 2023-09-09 | Stop reason: HOSPADM

## 2023-09-03 RX ADMIN — PANTOPRAZOLE SODIUM 40 MG: 40 INJECTION, POWDER, FOR SOLUTION INTRAVENOUS at 09:09

## 2023-09-03 RX ADMIN — LACTULOSE 10 G: 20 SOLUTION ORAL at 09:09

## 2023-09-03 RX ADMIN — PROPRANOLOL HYDROCHLORIDE 10 MG: 10 TABLET ORAL at 09:09

## 2023-09-03 RX ADMIN — RIFAXIMIN 550 MG: 550 TABLET ORAL at 09:09

## 2023-09-03 RX ADMIN — FOLIC ACID 1 MG: 1 TABLET ORAL at 09:09

## 2023-09-03 RX ADMIN — THIAMINE HYDROCHLORIDE 500 MG: 100 INJECTION, SOLUTION INTRAMUSCULAR; INTRAVENOUS at 09:09

## 2023-09-03 RX ADMIN — LORAZEPAM 1 MG: 2 INJECTION INTRAMUSCULAR; INTRAVENOUS at 02:09

## 2023-09-03 RX ADMIN — SPIRONOLACTONE 25 MG: 25 TABLET ORAL at 09:09

## 2023-09-03 RX ADMIN — THIAMINE HYDROCHLORIDE 500 MG: 100 INJECTION, SOLUTION INTRAMUSCULAR; INTRAVENOUS at 10:09

## 2023-09-03 RX ADMIN — THIAMINE HYDROCHLORIDE 500 MG: 100 INJECTION, SOLUTION INTRAMUSCULAR; INTRAVENOUS at 02:09

## 2023-09-03 RX ADMIN — FUROSEMIDE 20 MG: 10 INJECTION, SOLUTION INTRAVENOUS at 09:09

## 2023-09-03 RX ADMIN — HYDROMORPHONE HYDROCHLORIDE 1 MG: 2 INJECTION, SOLUTION INTRAMUSCULAR; INTRAVENOUS; SUBCUTANEOUS at 09:09

## 2023-09-03 RX ADMIN — SODIUM CHLORIDE: 9 INJECTION, SOLUTION INTRAVENOUS at 02:09

## 2023-09-03 NOTE — ASSESSMENT & PLAN NOTE
MELD score 22 points. 19.6% with estimated 3 month mortality  CIWA score 0    MRCP reveals cirrhosis and multiple gallstones      8/27: Pt is noted to have scleral icterus, bili 5.5, and continued hematochezia.   Put in for transfer (PFC order) for higher level of care. At 13:25, [facility] has no beds available, per Kaylee Perales RN. At 13:56, [facility] per Dr. Riggs has no available beds & is on diversion.  Call [facility] on 8/28 to see if any beds are available [phone]   8/30: pending transfer from [facility].   8/31: pending transfer.   9/1: pending transfer  9/3; pending transfer, no beds available at this time.

## 2023-09-03 NOTE — ASSESSMENT & PLAN NOTE
Likely secondary to liver cirrhosis    Per Dr. Gusman:  Active arterial bleeding on endoscopy in anal canal; too friable for clip. (In OR) Arterial bleeding at the 9 o'clock position in the anal canal; multiple bleeding hemorrhoidal plexus at 6 o'clock position on the posterior canal and at 3 o'clock position    Patient received 2 units of packed red blood cells in the OR and we will give FFP     total units of blood received 8 units.   8/25: No bleeding overnight  8/26/23 no obvious bleeding on rectal dressing    8/27: Rectal bleeding noted this morning. Rectal dressing/pad saturated with blod and clots. 1U PRBC ordered.   8/30: rectal bleeding this morning one unit of RBC ordered.   8/31: rectal bleeding this morning one unit of RBC ordered.   9/1: 1 unit of blood over night   9/3: received one unit of FFP yesterday and vitamin K, H/H this morning 5.4/16.5, will transfuse with 2 units pRBCs

## 2023-09-03 NOTE — ASSESSMENT & PLAN NOTE
Acute blood loss       Per significant other, had hematochezia 3x this morning. Noted pad saturated with blood and clots.  Hgb 7.2. 1U PRBC noted. Dr. Mahajan informed.   Monitor CBC  8/30: Hgb in morning was 6.1, one unit of Rbc ordered on noon check it was 7.2 will get H/H q 6hr, will continue to monitor and transfuse appropriately, will hopefully transfer soon.   8/31: Hgb this morning was 6.1, one unit of RBC ordered, at noon H/H was 7.3 will continue to trend H/H  9/1: Hgb 8.8 this morning required one unit of RBC last night.   9/3: transfused one unit of FFP yesterday. H/H this morning 5.4/16.5, will transfuse with 2 units pRBCs

## 2023-09-03 NOTE — PROGRESS NOTES
[facility] - [facility] Tele[facility] Hospital Medicine  Progress Note    Patient Name: John Spears  MRN: [id number]  Patient Class: IP- Inpatient   Admission Date: 8/19/2023  Length of Stay: 14 days  Attending Physician: Katherine Horton DO  Primary Care Provider: Romel Miranda        Subjective:     Principal Problem:Bleeding hemorrhoid        HPI:  Patient is a 49 y/o male with PMH of HTN, HLD, cirrhosis of the liver and hemorrhoids who presents to the ED with complaint of COB and swelling of the abdomen, legs and feet that started about 1 month ago. Patient states that the swelling and SOB has been worsening for the last 2 weeks. Patient reports passing dark blood per rectum with bowel movement for the last 2 weeks. Patient reports having intermittent RUQ when he eats fried foot. Patient reports chills but no fever. Patient denies nausea, vomiting, chest pain or urinary changes. Patient reports drinking about 10 beers of 16 ounces per day for the 3 years and for the last months he stopped drinking beer and started drinking  liquor. He states that 1 fifth of liquor lasts for 3 days. Patient reports withdrawal symptoms in the past. Patient reports that he quit smoking cigarettes 5 years ago.      In the ED showed /67, HR 90, RR 16, SpO2 98% and afebrile. Blood work showed H/H 6.1/19, WBC 5.6, PLT 78, PT 21.2, INR 1.87, PTT 48.2. Sodium 134, potassium 2.8, Alkaline phosphatase 166, Total bilirrubin 6.5, AST/ALST 61/41, p-, troponin 16.7. UA negative for infection or blood. FOBT negative. EKG nsr with HR 89. Patient is receiving PRBC in the ER at this moment and electrolytes are being replenished. Patient will be admitted to the hospital for further management.       Overview/Hospital Course:  No notes on file    Interval History: Patient is sleepy this morning. Still awaiting transfer, will try to get transferred, he has updated insurance card.     Review of Systems    Constitutional:  Positive for activity change. Negative for fatigue.   Respiratory:  Negative for chest tightness and shortness of breath.    Cardiovascular:  Positive for leg swelling. Negative for chest pain.   Gastrointestinal:  Positive for abdominal distention and anal bleeding.   Skin:  Negative for rash and wound.   Neurological:  Negative for tremors.     Objective:     Vital Signs (Most Recent):  Temp: 98 °F (36.7 °C) (09/03/23 0705)  Pulse: 75 (09/03/23 0705)  Resp: 18 (09/03/23 0705)  BP: (!) 147/83 (09/03/23 0705)  SpO2: 100 % (09/03/23 0705) Vital Signs (24h Range):  Temp:  [97.6 °F (36.4 °C)-98.6 °F (37 °C)] 98 °F (36.7 °C)  Pulse:  [62-82] 75  Resp:  [18] 18  SpO2:  [95 %-100 %] 100 %  BP: (130-147)/(56-83) 147/83     Weight: (!) 149.7 kg (330 lb 0.5 oz)  Body mass index is 54.92 kg/m².    Intake/Output Summary (Last 24 hours) at 9/3/2023 0917  Last data filed at 9/3/2023 0200  Gross per 24 hour   Intake 573.33 ml   Output 1 ml   Net 572.33 ml         Physical Exam  Vitals and nursing note reviewed.   Constitutional:       Appearance: He is obese. He is ill-appearing.      Comments: Pt is somnolent but aroused easily   HENT:      Head: Normocephalic.      Right Ear: External ear normal.      Left Ear: External ear normal.      Nose: Nose normal.      Mouth/Throat:      Mouth: Mucous membranes are moist.      Pharynx: Oropharynx is clear.   Eyes:      General: Scleral icterus present.   Cardiovascular:      Rate and Rhythm: Normal rate and regular rhythm.      Heart sounds: Normal heart sounds.   Pulmonary:      Breath sounds: Normal breath sounds.   Abdominal:      General: There is distension.   Musculoskeletal:      Right lower leg: Edema present.      Left lower leg: Edema present.   Skin:     General: Skin is warm and dry.      Capillary Refill: Capillary refill takes less than 2 seconds.   Neurological:      Mental Status: He is oriented to person, place, and time.             Significant Labs:  All pertinent labs within the past 24 hours have been reviewed.    Significant Imaging: I have reviewed all pertinent imaging results/findings within the past 24 hours.      Assessment/Plan:      * Bleeding hemorrhoid  Likely secondary to liver cirrhosis    Per Dr. Gusman:  Active arterial bleeding on endoscopy in anal canal; too friable for clip. (In OR) Arterial bleeding at the 9 o'clock position in the anal canal; multiple bleeding hemorrhoidal plexus at 6 o'clock position on the posterior canal and at 3 o'clock position    Patient received 2 units of packed red blood cells in the OR and we will give FFP     total units of blood received 8 units.   8/25: No bleeding overnight  8/26/23 no obvious bleeding on rectal dressing    8/27: Rectal bleeding noted this morning. Rectal dressing/pad saturated with blod and clots. 1U PRBC ordered.   8/30: rectal bleeding this morning one unit of RBC ordered.   8/31: rectal bleeding this morning one unit of RBC ordered.   9/1: 1 unit of blood over night   9/3: received one unit of FFP yesterday and vitamin K, H/H this morning 5.4/16.5, will transfuse with 2 units pRBCs    Hyperammonemia  8/27: Continue with rifaxmin  Started on lactulose on 8/26  Ammonia level 52      Acute lower GI bleeding    Patient received 2 units of packed red blood cells in the OR and we will give FFP  8/25: Hgb 7.8  8/26/23 no obvious rectal bleeding  8/30: 1 unit of blood ordered.   8/31: 1 unit of blood   9/1; 1 unit of blood overnight  9/3: 1 unit of FFP yesterday. H/H this morning 5.4/16.5, will transfuse with 2 units pRBCs    Hypertension  Monitor BP    Alcoholic cirrhosis of liver  MELD score 22 points. 19.6% with estimated 3 month mortality  CIWA score 0    MRCP reveals cirrhosis and multiple gallstones      8/27: Pt is noted to have scleral icterus, bili 5.5, and continued hematochezia.   Put in for transfer (PFC order) for higher level of care. At 13:25, [facility] has no beds available, per  Kaylee Perales RN. At 13:56, [facility] per Dr. Riggs has no available beds & is on diversion.  Call [facility] on 8/28 to see if any beds are available [phone]   8/30: pending transfer from [facility].   8/31: pending transfer.   9/1: pending transfer  9/3; pending transfer, no beds available at this time.       Anemia  Acute blood loss       Per significant other, had hematochezia 3x this morning. Noted pad saturated with blood and clots.  Hgb 7.2. 1U PRBC noted. Dr. Mahajan informed.   Monitor CBC  8/30: Hgb in morning was 6.1, one unit of Rbc ordered on noon check it was 7.2 will get H/H q 6hr, will continue to monitor and transfuse appropriately, will hopefully transfer soon.   8/31: Hgb this morning was 6.1, one unit of RBC ordered, at noon H/H was 7.3 will continue to trend H/H  9/1: Hgb 8.8 this morning required one unit of RBC last night.   9/3: transfused one unit of FFP yesterday. H/H this morning 5.4/16.5, will transfuse with 2 units pRBCs    Hypoalbuminemia  Likely from malturition + or - synthetic function of the liver  C/w Spironolactone and lasix      VTE Risk Mitigation (From admission, onward)           Ordered     Reason for No Pharmacological VTE Prophylaxis  Once        Question:  Reasons:  Answer:  Active Bleeding    08/20/23 0043     IP VTE HIGH RISK PATIENT  Once         08/20/23 0043     Place sequential compression device  Until discontinued         08/20/23 0043                    Discharge Planning   LUCRECIA:      Code Status: Full Code   Is the patient medically ready for discharge?:     Reason for patient still in hospital (select all that apply): Treatment  Discharge Plan A: Home with family                  Zach Morales DO  Department of Hospital Medicine   [facility] - [facility]

## 2023-09-03 NOTE — ASSESSMENT & PLAN NOTE
Patient received 2 units of packed red blood cells in the OR and we will give FFP  8/25: Hgb 7.8  8/26/23 no obvious rectal bleeding  8/30: 1 unit of blood ordered.   8/31: 1 unit of blood   9/1; 1 unit of blood overnight  9/3: 1 unit of FFP yesterday. H/H this morning 5.4/16.5, will transfuse with 2 units pRBCs

## 2023-09-03 NOTE — SUBJECTIVE & OBJECTIVE
Interval History: Patient is sleepy this morning. Still awaiting transfer, will try to get transferred, he has updated insurance card.     Review of Systems   Constitutional:  Positive for activity change. Negative for fatigue.   Respiratory:  Negative for chest tightness and shortness of breath.    Cardiovascular:  Positive for leg swelling. Negative for chest pain.   Gastrointestinal:  Positive for abdominal distention and anal bleeding.   Skin:  Negative for rash and wound.   Neurological:  Negative for tremors.     Objective:     Vital Signs (Most Recent):  Temp: 98 °F (36.7 °C) (09/03/23 0705)  Pulse: 75 (09/03/23 0705)  Resp: 18 (09/03/23 0705)  BP: (!) 147/83 (09/03/23 0705)  SpO2: 100 % (09/03/23 0705) Vital Signs (24h Range):  Temp:  [97.6 °F (36.4 °C)-98.6 °F (37 °C)] 98 °F (36.7 °C)  Pulse:  [62-82] 75  Resp:  [18] 18  SpO2:  [95 %-100 %] 100 %  BP: (130-147)/(56-83) 147/83     Weight: (!) 149.7 kg (330 lb 0.5 oz)  Body mass index is 54.92 kg/m².    Intake/Output Summary (Last 24 hours) at 9/3/2023 0917  Last data filed at 9/3/2023 0200  Gross per 24 hour   Intake 573.33 ml   Output 1 ml   Net 572.33 ml         Physical Exam  Vitals and nursing note reviewed.   Constitutional:       Appearance: He is obese. He is ill-appearing.      Comments: Pt is somnolent but aroused easily   HENT:      Head: Normocephalic.      Right Ear: External ear normal.      Left Ear: External ear normal.      Nose: Nose normal.      Mouth/Throat:      Mouth: Mucous membranes are moist.      Pharynx: Oropharynx is clear.   Eyes:      General: Scleral icterus present.   Cardiovascular:      Rate and Rhythm: Normal rate and regular rhythm.      Heart sounds: Normal heart sounds.   Pulmonary:      Breath sounds: Normal breath sounds.   Abdominal:      General: There is distension.   Musculoskeletal:      Right lower leg: Edema present.      Left lower leg: Edema present.   Skin:     General: Skin is warm and dry.      Capillary  Refill: Capillary refill takes less than 2 seconds.   Neurological:      Mental Status: He is oriented to person, place, and time.             Significant Labs: All pertinent labs within the past 24 hours have been reviewed.    Significant Imaging: I have reviewed all pertinent imaging results/findings within the past 24 hours.

## 2023-09-04 LAB
ALBUMIN SERPL BCP-MCNC: 1.8 G/DL (ref 3.5–5)
ALBUMIN/GLOB SERPL: 0.5 {RATIO}
ALP SERPL-CCNC: 168 U/L (ref 45–115)
ALT SERPL W P-5'-P-CCNC: 51 U/L (ref 16–61)
ANION GAP SERPL CALCULATED.3IONS-SCNC: 12 MMOL/L (ref 7–16)
AST SERPL W P-5'-P-CCNC: 71 U/L (ref 15–37)
BASOPHILS # BLD AUTO: 0.08 K/UL (ref 0–0.2)
BASOPHILS NFR BLD AUTO: 0.5 % (ref 0–1)
BILIRUB SERPL-MCNC: 4.4 MG/DL (ref ?–1.2)
BUN SERPL-MCNC: 29 MG/DL (ref 7–18)
BUN/CREAT SERPL: 17 (ref 6–20)
CALCIUM SERPL-MCNC: 7.7 MG/DL (ref 8.5–10.1)
CHLORIDE SERPL-SCNC: 98 MMOL/L (ref 98–107)
CO2 SERPL-SCNC: 27 MMOL/L (ref 21–32)
CREAT SERPL-MCNC: 1.66 MG/DL (ref 0.7–1.3)
DIFFERENTIAL METHOD BLD: ABNORMAL
EGFR (NO RACE VARIABLE) (RUSH/TITUS): 50 ML/MIN/1.73M2
EOSINOPHIL # BLD AUTO: 0.53 K/UL (ref 0–0.5)
EOSINOPHIL NFR BLD AUTO: 3.5 % (ref 1–4)
ERYTHROCYTE [DISTWIDTH] IN BLOOD BY AUTOMATED COUNT: 18.6 % (ref 11.5–14.5)
GLOBULIN SER-MCNC: 4 G/DL (ref 2–4)
GLUCOSE SERPL-MCNC: 78 MG/DL (ref 74–106)
HCT VFR BLD AUTO: 23.6 % (ref 40–54)
HCT VFR BLD AUTO: 24.6 % (ref 40–54)
HCT VFR BLD AUTO: 25.1 % (ref 40–54)
HCT VFR BLD AUTO: 25.1 % (ref 40–54)
HCT VFR BLD AUTO: 26.2 % (ref 40–54)
HGB BLD-MCNC: 8.2 G/DL (ref 13.5–18)
HGB BLD-MCNC: 8.4 G/DL (ref 13.5–18)
HGB BLD-MCNC: 8.7 G/DL (ref 13.5–18)
IMM GRANULOCYTES # BLD AUTO: 0.11 K/UL (ref 0–0.04)
IMM GRANULOCYTES NFR BLD: 0.7 % (ref 0–0.4)
INR BLD: 1.78
LYMPHOCYTES # BLD AUTO: 1.15 K/UL (ref 1–4.8)
LYMPHOCYTES NFR BLD AUTO: 7.7 % (ref 27–41)
MCH RBC QN AUTO: 30 PG (ref 27–31)
MCHC RBC AUTO-ENTMCNC: 32.7 G/DL (ref 32–36)
MCV RBC AUTO: 91.9 FL (ref 80–96)
MONOCYTES # BLD AUTO: 2.94 K/UL (ref 0–0.8)
MONOCYTES NFR BLD AUTO: 19.6 % (ref 2–6)
MPC BLD CALC-MCNC: 11.7 FL (ref 9.4–12.4)
NEUTROPHILS # BLD AUTO: 10.19 K/UL (ref 1.8–7.7)
NEUTROPHILS NFR BLD AUTO: 68 % (ref 53–65)
NRBC # BLD AUTO: 0 X10E3/UL
NRBC, AUTO (.00): 0 %
PLATELET # BLD AUTO: 93 K/UL (ref 150–400)
POTASSIUM SERPL-SCNC: 3.9 MMOL/L (ref 3.5–5.1)
PROT SERPL-MCNC: 5.8 G/DL (ref 6.4–8.2)
PROTHROMBIN TIME: 20.4 SECONDS (ref 11.7–14.7)
RBC # BLD AUTO: 2.73 M/UL (ref 4.6–6.2)
SODIUM SERPL-SCNC: 133 MMOL/L (ref 136–145)
WBC # BLD AUTO: 15 K/UL (ref 4.5–11)

## 2023-09-04 PROCEDURE — 25000003 PHARM REV CODE 250: Performed by: STUDENT IN AN ORGANIZED HEALTH CARE EDUCATION/TRAINING PROGRAM

## 2023-09-04 PROCEDURE — 85014 HEMATOCRIT: CPT

## 2023-09-04 PROCEDURE — 63600175 PHARM REV CODE 636 W HCPCS: Performed by: STUDENT IN AN ORGANIZED HEALTH CARE EDUCATION/TRAINING PROGRAM

## 2023-09-04 PROCEDURE — 85610 PROTHROMBIN TIME: CPT | Performed by: STUDENT IN AN ORGANIZED HEALTH CARE EDUCATION/TRAINING PROGRAM

## 2023-09-04 PROCEDURE — 25000003 PHARM REV CODE 250

## 2023-09-04 PROCEDURE — 85025 COMPLETE CBC W/AUTO DIFF WBC: CPT | Performed by: STUDENT IN AN ORGANIZED HEALTH CARE EDUCATION/TRAINING PROGRAM

## 2023-09-04 PROCEDURE — 80053 COMPREHEN METABOLIC PANEL: CPT | Performed by: STUDENT IN AN ORGANIZED HEALTH CARE EDUCATION/TRAINING PROGRAM

## 2023-09-04 PROCEDURE — 63600175 PHARM REV CODE 636 W HCPCS: Performed by: INTERNAL MEDICINE

## 2023-09-04 PROCEDURE — 99233 PR SUBSEQUENT HOSPITAL CARE,LEVL III: ICD-10-PCS | Mod: GC,,, | Performed by: HOSPITALIST

## 2023-09-04 PROCEDURE — 99233 SBSQ HOSP IP/OBS HIGH 50: CPT | Mod: GC,,, | Performed by: HOSPITALIST

## 2023-09-04 PROCEDURE — C9113 INJ PANTOPRAZOLE SODIUM, VIA: HCPCS | Performed by: INTERNAL MEDICINE

## 2023-09-04 PROCEDURE — 11000001 HC ACUTE MED/SURG PRIVATE ROOM

## 2023-09-04 RX ADMIN — FUROSEMIDE 20 MG: 10 INJECTION, SOLUTION INTRAVENOUS at 09:09

## 2023-09-04 RX ADMIN — FOLIC ACID 1 MG: 1 TABLET ORAL at 08:09

## 2023-09-04 RX ADMIN — PANTOPRAZOLE SODIUM 40 MG: 40 INJECTION, POWDER, FOR SOLUTION INTRAVENOUS at 08:09

## 2023-09-04 RX ADMIN — RIFAXIMIN 550 MG: 550 TABLET ORAL at 08:09

## 2023-09-04 RX ADMIN — Medication 6 MG: at 08:09

## 2023-09-04 RX ADMIN — FUROSEMIDE 20 MG: 10 INJECTION, SOLUTION INTRAVENOUS at 08:09

## 2023-09-04 RX ADMIN — THIAMINE HYDROCHLORIDE 500 MG: 100 INJECTION, SOLUTION INTRAMUSCULAR; INTRAVENOUS at 04:09

## 2023-09-04 RX ADMIN — PROPRANOLOL HYDROCHLORIDE 10 MG: 10 TABLET ORAL at 08:09

## 2023-09-04 RX ADMIN — OXYCODONE AND ACETAMINOPHEN 1 TABLET: 10; 325 TABLET ORAL at 08:09

## 2023-09-04 RX ADMIN — SPIRONOLACTONE 25 MG: 25 TABLET ORAL at 08:09

## 2023-09-04 RX ADMIN — THIAMINE HYDROCHLORIDE 500 MG: 100 INJECTION, SOLUTION INTRAMUSCULAR; INTRAVENOUS at 08:09

## 2023-09-04 RX ADMIN — LACTULOSE 10 G: 20 SOLUTION ORAL at 08:09

## 2023-09-04 RX ADMIN — THIAMINE HYDROCHLORIDE 500 MG: 100 INJECTION, SOLUTION INTRAMUSCULAR; INTRAVENOUS at 09:09

## 2023-09-04 NOTE — ASSESSMENT & PLAN NOTE
Acute blood loss       Per significant other, had hematochezia 3x this morning. Noted pad saturated with blood and clots.  Hgb 7.2. 1U PRBC noted. Dr. Mahajan informed.   Monitor CBC  8/30: Hgb in morning was 6.1, one unit of Rbc ordered on noon check it was 7.2 will get H/H q 6hr, will continue to monitor and transfuse appropriately, will hopefully transfer soon.   8/31: Hgb this morning was 6.1, one unit of RBC ordered, at noon H/H was 7.3 will continue to trend H/H  9/1: Hgb 8.8 this morning required one unit of RBC last night.   9/3: transfused one unit of FFP yesterday. H/H this morning 5.4/16.5, will transfuse with 2 units pRBCs  9/4 H/H 8.4/24/6, will continue to monitor.

## 2023-09-04 NOTE — ASSESSMENT & PLAN NOTE
MELD score 22 points. 19.6% with estimated 3 month mortality  CIWA score 0    MRCP reveals cirrhosis and multiple gallstones    8/27: Pt is noted to have scleral icterus, bili 5.5, and continued hematochezia.   Put in for transfer (PFC order) for higher level of care. At 13:25, [facility] has no beds available, per Kaylee Perales RN. At 13:56, [facility] per Dr. Riggs has no available beds & is on diversion.  Call [facility] on 8/28 to see if any beds are available [phone]   8/30: pending transfer from [facility].   8/31: pending transfer.   9/1: pending transfer  9/3; pending transfer, no beds available at this time.   9/4: pending transfer

## 2023-09-04 NOTE — SUBJECTIVE & OBJECTIVE
Interval History: Patient is a little more arrousalable today. Patient did require two units of RBC yesterday afternoon. Will continue to monitor. Hopeful to transfer tomorrow.     Review of Systems   Constitutional:  Positive for activity change. Negative for fatigue.   Respiratory:  Negative for chest tightness and shortness of breath.    Cardiovascular:  Positive for leg swelling. Negative for chest pain.   Gastrointestinal:  Positive for abdominal distention and anal bleeding.   Skin:  Negative for rash and wound.   Neurological:  Negative for tremors.     Objective:     Vital Signs (Most Recent):  Temp: 97.8 °F (36.6 °C) (09/04/23 1100)  Pulse: 70 (09/04/23 1100)  Resp: 18 (09/04/23 1100)  BP: 122/62 (09/04/23 1100)  SpO2: 98 % (09/04/23 1100) Vital Signs (24h Range):  Temp:  [97.1 °F (36.2 °C)-98.8 °F (37.1 °C)] 97.8 °F (36.6 °C)  Pulse:  [70-83] 70  Resp:  [16-20] 18  SpO2:  [97 %-100 %] 98 %  BP: (121-140)/(59-80) 122/62     Weight: (!) 156.5 kg (345 lb 0.3 oz)  Body mass index is 57.41 kg/m².    Intake/Output Summary (Last 24 hours) at 9/4/2023 1326  Last data filed at 9/4/2023 0750  Gross per 24 hour   Intake 719.33 ml   Output 2700 ml   Net -1980.67 ml         Physical Exam  Vitals and nursing note reviewed.   Constitutional:       Appearance: He is obese. He is ill-appearing.      Comments: Pt is somnolent but aroused easily   HENT:      Head: Normocephalic.      Right Ear: External ear normal.      Left Ear: External ear normal.      Nose: Nose normal.      Mouth/Throat:      Mouth: Mucous membranes are moist.      Pharynx: Oropharynx is clear.   Eyes:      General: Scleral icterus present.   Cardiovascular:      Rate and Rhythm: Normal rate and regular rhythm.      Heart sounds: Normal heart sounds.   Pulmonary:      Breath sounds: Normal breath sounds.   Abdominal:      General: There is distension.   Musculoskeletal:      Right lower leg: Edema present.      Left lower leg: Edema present.   Skin:      General: Skin is warm and dry.      Capillary Refill: Capillary refill takes less than 2 seconds.   Neurological:      Mental Status: He is oriented to person, place, and time.             Significant Labs: All pertinent labs within the past 24 hours have been reviewed.    Significant Imaging: I have reviewed all pertinent imaging results/findings within the past 24 hours.

## 2023-09-04 NOTE — ASSESSMENT & PLAN NOTE
Likely secondary to liver cirrhosis    Per Dr. Gusman:  Active arterial bleeding on endoscopy in anal canal; too friable for clip. (In OR) Arterial bleeding at the 9 o'clock position in the anal canal; multiple bleeding hemorrhoidal plexus at 6 o'clock position on the posterior canal and at 3 o'clock position    Patient received 2 units of packed red blood cells in the OR and we will give FFP     total units of blood received 8 units.   8/25: No bleeding overnight  8/26/23 no obvious bleeding on rectal dressing    8/27: Rectal bleeding noted this morning. Rectal dressing/pad saturated with blod and clots. 1U PRBC ordered.   8/30: rectal bleeding this morning one unit of RBC ordered.   8/31: rectal bleeding this morning one unit of RBC ordered.   9/1: 1 unit of blood over night   9/3: received one unit of FFP yesterday and vitamin K, H/H this morning 5.4/16.5, will transfuse with 2 units pRBCs  9/4: H/H 8.4/24.6, will continue to monitor.

## 2023-09-04 NOTE — PROGRESS NOTES
[facility] Tele[facility] Medicine  Progress Note    Patient Name: John Spears  MRN: [id number]  Patient Class: IP- Inpatient   Admission Date: 8/19/2023  Length of Stay: 15 days  Attending Physician: Karen Chamorro MD  Primary Care Provider: Ruben Provider        Subjective:     Principal Problem:Bleeding hemorrhoid        HPI:  Patient is a 49 y/o male with PMH of HTN, HLD, cirrhosis of the liver and hemorrhoids who presents to the ED with complaint of COB and swelling of the abdomen, legs and feet that started about 1 month ago. Patient states that the swelling and SOB has been worsening for the last 2 weeks. Patient reports passing dark blood per rectum with bowel movement for the last 2 weeks. Patient reports having intermittent RUQ when he eats fried foot. Patient reports chills but no fever. Patient denies nausea, vomiting, chest pain or urinary changes. Patient reports drinking about 10 beers of 16 ounces per day for the 3 years and for the last months he stopped drinking beer and started drinking  liquor. He states that 1 fifth of liquor lasts for 3 days. Patient reports withdrawal symptoms in the past. Patient reports that he quit smoking cigarettes 5 years ago.      In the ED showed /67, HR 90, RR 16, SpO2 98% and afebrile. Blood work showed H/H 6.1/19, WBC 5.6, PLT 78, PT 21.2, INR 1.87, PTT 48.2. Sodium 134, potassium 2.8, Alkaline phosphatase 166, Total bilirrubin 6.5, AST/ALST 61/41, p-, troponin 16.7. UA negative for infection or blood. FOBT negative. EKG nsr with HR 89. Patient is receiving PRBC in the ER at this moment and electrolytes are being replenished. Patient will be admitted to the hospital for further management.       Overview/Hospital Course:  No notes on file    Interval History: Patient is a little more arrousalable today. Patient did require two units of RBC yesterday afternoon. Will continue to monitor. Hopeful to  transfer tomorrow.     Review of Systems   Constitutional:  Positive for activity change. Negative for fatigue.   Respiratory:  Negative for chest tightness and shortness of breath.    Cardiovascular:  Positive for leg swelling. Negative for chest pain.   Gastrointestinal:  Positive for abdominal distention and anal bleeding.   Skin:  Negative for rash and wound.   Neurological:  Negative for tremors.     Objective:     Vital Signs (Most Recent):  Temp: 97.8 °F (36.6 °C) (09/04/23 1100)  Pulse: 70 (09/04/23 1100)  Resp: 18 (09/04/23 1100)  BP: 122/62 (09/04/23 1100)  SpO2: 98 % (09/04/23 1100) Vital Signs (24h Range):  Temp:  [97.1 °F (36.2 °C)-98.8 °F (37.1 °C)] 97.8 °F (36.6 °C)  Pulse:  [70-83] 70  Resp:  [16-20] 18  SpO2:  [97 %-100 %] 98 %  BP: (121-140)/(59-80) 122/62     Weight: (!) 156.5 kg (345 lb 0.3 oz)  Body mass index is 57.41 kg/m².    Intake/Output Summary (Last 24 hours) at 9/4/2023 1326  Last data filed at 9/4/2023 0750  Gross per 24 hour   Intake 719.33 ml   Output 2700 ml   Net -1980.67 ml         Physical Exam  Vitals and nursing note reviewed.   Constitutional:       Appearance: He is obese. He is ill-appearing.      Comments: Pt is somnolent but aroused easily   HENT:      Head: Normocephalic.      Right Ear: External ear normal.      Left Ear: External ear normal.      Nose: Nose normal.      Mouth/Throat:      Mouth: Mucous membranes are moist.      Pharynx: Oropharynx is clear.   Eyes:      General: Scleral icterus present.   Cardiovascular:      Rate and Rhythm: Normal rate and regular rhythm.      Heart sounds: Normal heart sounds.   Pulmonary:      Breath sounds: Normal breath sounds.   Abdominal:      General: There is distension.   Musculoskeletal:      Right lower leg: Edema present.      Left lower leg: Edema present.   Skin:     General: Skin is warm and dry.      Capillary Refill: Capillary refill takes less than 2 seconds.   Neurological:      Mental Status: He is oriented to  person, place, and time.             Significant Labs: All pertinent labs within the past 24 hours have been reviewed.    Significant Imaging: I have reviewed all pertinent imaging results/findings within the past 24 hours.      Assessment/Plan:      * Bleeding hemorrhoid  Likely secondary to liver cirrhosis    Per Dr. Gusman:  Active arterial bleeding on endoscopy in anal canal; too friable for clip. (In OR) Arterial bleeding at the 9 o'clock position in the anal canal; multiple bleeding hemorrhoidal plexus at 6 o'clock position on the posterior canal and at 3 o'clock position    Patient received 2 units of packed red blood cells in the OR and we will give FFP     total units of blood received 8 units.   8/25: No bleeding overnight  8/26/23 no obvious bleeding on rectal dressing    8/27: Rectal bleeding noted this morning. Rectal dressing/pad saturated with blod and clots. 1U PRBC ordered.   8/30: rectal bleeding this morning one unit of RBC ordered.   8/31: rectal bleeding this morning one unit of RBC ordered.   9/1: 1 unit of blood over night   9/3: received one unit of FFP yesterday and vitamin K, H/H this morning 5.4/16.5, will transfuse with 2 units pRBCs  9/4: H/H 8.4/24.6, will continue to monitor.     Hyperammonemia  8/27: Continue with rifaxmin  Started on lactulose on 8/26  Ammonia level 52      Acute lower GI bleeding  Patient received 2 units of packed red blood cells in the OR and we will give FFP  8/25: Hgb 7.8  8/26/23 no obvious rectal bleeding  8/30: 1 unit of blood ordered.   8/31: 1 unit of blood   9/1; 1 unit of blood overnight  9/3: 1 unit of FFP yesterday. H/H this morning 5.4/16.5, will transfuse with 2 units pRBCs  9/4: H/H 8.4/24.6, will continue to monitor.     Hypertension  Monitor BP    Alcoholic cirrhosis of liver  MELD score 22 points. 19.6% with estimated 3 month mortality  CIWA score 0    MRCP reveals cirrhosis and multiple gallstones    8/27: Pt is noted to have scleral icterus,  bili 5.5, and continued hematochezia.   Put in for transfer (PFC order) for higher level of care. At 13:25, [facility] has no beds available, per Kaylee Perales RN. At 13:56, [facility] per Dr. Riggs has no available beds & is on diversion.  Call [facility] on 8/28 to see if any beds are available [phone]   8/30: pending transfer from [facility].   8/31: pending transfer.   9/1: pending transfer  9/3; pending transfer, no beds available at this time.   9/4: pending transfer      Anemia  Acute blood loss       Per significant other, had hematochezia 3x this morning. Noted pad saturated with blood and clots.  Hgb 7.2. 1U PRBC noted. Dr. Mahajan informed.   Monitor CBC  8/30: Hgb in morning was 6.1, one unit of Rbc ordered on noon check it was 7.2 will get H/H q 6hr, will continue to monitor and transfuse appropriately, will hopefully transfer soon.   8/31: Hgb this morning was 6.1, one unit of RBC ordered, at noon H/H was 7.3 will continue to trend H/H  9/1: Hgb 8.8 this morning required one unit of RBC last night.   9/3: transfused one unit of FFP yesterday. H/H this morning 5.4/16.5, will transfuse with 2 units pRBCs  9/4 H/H 8.4/24/6, will continue to monitor.     Hypoalbuminemia  Likely from malturition + or - synthetic function of the liver  C/w Spironolactone and lasix      VTE Risk Mitigation (From admission, onward)         Ordered     Reason for No Pharmacological VTE Prophylaxis  Once        Question:  Reasons:  Answer:  Active Bleeding    08/20/23 0043     IP VTE HIGH RISK PATIENT  Once         08/20/23 0043     Place sequential compression device  Until discontinued         08/20/23 0043                Discharge Planning   LUCRECIA:      Code Status: Full Code   Is the patient medically ready for discharge?:     Reason for patient still in hospital (select all that apply): Treatment  Discharge Plan A: Home with family                  Zach Morales DO  Department of [facility] Medicine   [facility] - [facility]  Telemetry

## 2023-09-04 NOTE — ASSESSMENT & PLAN NOTE
Patient received 2 units of packed red blood cells in the OR and we will give FFP  8/25: Hgb 7.8  8/26/23 no obvious rectal bleeding  8/30: 1 unit of blood ordered.   8/31: 1 unit of blood   9/1; 1 unit of blood overnight  9/3: 1 unit of FFP yesterday. H/H this morning 5.4/16.5, will transfuse with 2 units pRBCs  9/4: H/H 8.4/24.6, will continue to monitor.

## 2023-09-04 NOTE — PROGRESS NOTES
"[facility] - [address] [place] [address]  Adult Nutrition  First Assessment Note         Reason for Assessment  Reason For Assessment: RD follow-up   Nutrition Risk Screen: no indicators present    Assessment and Plan  RD follow up. He was admitted 8/19 for bleeding hemorrhoid.     Patient is 345 pounds with a BMI of 57.14 and is morbidly obese. He is ordered a Regular diet. No intake documented. Recommend continue current diet as able/appropriate and tolerated. Encourage good po intakes.     Last BM 9/3  per flowsheet.    Medications/labs reviewed.     Per MD note on 9/3/2023:  "I have seen the patient, reviewed the Resident's progress note. I have personally interviewed and examined the patient at bedside and agree with the findings.patient slowly improving considered sending to Specialty wife is adamant "no specialty ever". He is improving on rifamixin and difficid. Lost iv access and after 4 stick refused to let floor nurse try again so changed his solumedrol to po prednisone. Overall improved. Wife follows him closely and is usually to be found at his bedside. He is pleasantly demented. He is still hypotensive check legs every day restart low dose lasix if they start welling up."    Skin Integrity  Luis Risk Assessment  Sensory Perception: 3-->slightly limited  Moisture: 2-->very moist  Activity: 1-->bedfast  Mobility: 2-->very limited  Nutrition: 2-->probably inadequate  Friction and Shear: 2-->potential problem  Luis Score: 12        Malnutrition  Is patient malnourished? No      Nutrition Diagnosis    Overweight related to excessive po intakes as evidenced by elevated BMI    Interventions/Recommendations (treatment strategy):  Recommend continue current diet as able/appropirate and tolerated. Encourage good po intakes.    Nutrition Diagnosis Status:   Progressing to goal    Nutrition Risk  Level of Risk/Frequency of Follow-up: low    Recent Labs   Lab 09/04/23  0400   GLU 78         Nutrition " "Prescription / Recommendations  Recommendation/Intervention: Recommend continue current diet as able/appropirate and tolerated. Encourage good po intakes.  Goals: Weight maintenance, intake % of meals  Nutrition Goal Status: progressing towards goal  Current Diet Order: Regular  Chewing or Swallowing Difficulty?: No Chewing or swallowing difficulty  Recommended Diet: Regular  Recommended Oral Supplement: No Oral Supplements  Is Nutrition Support Recommended: No   Is Education Recommended: No  Monitor and Evaluation  % current Intake: New Diet order with no P.O. intake documented currently  % intake to meet estimated needs: 75 - 100 %  Food and Nutrient Intake: food and beverage intake  Food and Nutrient Adminstration: diet order  Anthropometric Measurements: weight, weight change, body mass index  Biochemical Data, Medical Tests and Procedures: electrolyte and renal panel, lipid profile, gastrointestinal profile, glucose/endocrine profile, inflammatory profile  Nutrition-Focused Physical Findings: overall appearance, extremities, muscles and bones, head and eyes, skin     Current Medical Diagnosis and Past Medical History  Diagnosis: other (see comments)  Past Medical History:   Diagnosis Date    Fatty liver     History of alcohol abuse     Hypertension     Mixed hyperlipidemia     Thyroid disease      Nutrition/Diet History     Lab/Procedures/Meds  Recent Labs   Lab 09/04/23  0400   *   K 3.9   BUN 29*   CREATININE 1.66*   CALCIUM 7.7*   ALBUMIN 1.8*   CL 98   ALT 51   AST 71*     Note: Na+, K+, Ca++, Alb low. BUN/Cr elevated.   Last A1c: No results found for: "HGBA1C"  Lab Results   Component Value Date    RBC 2.73 (L) 09/04/2023    HGB 8.2 (L) 09/04/2023    HGB 8.2 (L) 09/04/2023    HCT 25.1 (L) 09/04/2023    HCT 25.1 (L) 09/04/2023    MCV 91.9 09/04/2023    MCH 30.0 09/04/2023    MCHC 32.7 09/04/2023    TIBC 340 08/22/2023     Pertinent Labs Reviewed: reviewed  Pertinent Labs Comments: Sodium: 134 " "(L)  Potassium: 3.2 (L)  Chloride: 99  CO2: 32  Anion Gap: 6 (L)  BUN: 19 (H)  Creatinine: 1.54 (H)  BUN/CREAT RATIO: 12  eGFR: 55 (L)  Glucose: 93  Calcium: 7.7 (L)  Alkaline Phosphatase: 105  PROTEIN TOTAL: 5.5 (L)  Albumin: 1.6 (L)  Albumin/Globulin Ratio: 0.4  BILIRUBIN TOTAL: 6.0 (H)  AST: 51 (H)  ALT: 24  Globulin, Total: 3.9  Pertinent Medications Reviewed: reviewed  Pertinent Medications Comments: folic acid, furosemide, lactulose, pantoprazole, propanolol, rifamixin, spironlactone, thiamine  Anthropometrics  Temp: 98.3 °F (36.8 °C)  Height Method: Stated  Height: 5' 5" (165.1 cm)  Height (inches): 65 in  Weight Method: Bed Scale  Weight: (!) 156.5 kg (345 lb 0.3 oz)  Weight (lb): (!) 345.02 lb  Ideal Body Weight (IBW), Male: 136 lb  % Ideal Body Weight, Male (lb): 236.03 %  BMI (Calculated): 57.4     Estimated/Assessed Needs  RMR (Crockett-St. Jeor Equation): 2351.88     Temp: 98.3 °F (36.8 °C)Oral  Weight Used For Calorie Calculations: 83.9 kg (185 lb)     Energy Calorie Requirements (kcal): 2097-2517kcal (25-30kcal/kg Adj BW)  Weight Used For Protein Calculations: 83.9 kg (185 lb)  Protein Requirements: 67-84g (0.8-1.0g/kg Adj BW)       RDA Method (mL): 2097     Nutrition by Nursing  Diet/Nutrition Received: regular     Diet/Feeding Assistance: assisted with feeding  Diet/Feeding Tolerance: good  Last Bowel Movement: 09/03/23                Nutrition Follow-Up  RD Follow-up?: Yes      "

## 2023-09-05 LAB
ALBUMIN SERPL BCP-MCNC: 1.6 G/DL (ref 3.5–5)
ALBUMIN/GLOB SERPL: 0.4 {RATIO}
ALP SERPL-CCNC: 136 U/L (ref 45–115)
ALT SERPL W P-5'-P-CCNC: 64 U/L (ref 16–61)
AMMONIA PLAS-SCNC: 61 ΜMOL/L (ref 11–32)
ANION GAP SERPL CALCULATED.3IONS-SCNC: 9 MMOL/L (ref 7–16)
AST SERPL W P-5'-P-CCNC: 96 U/L (ref 15–37)
BASOPHILS # BLD AUTO: 0.08 K/UL (ref 0–0.2)
BASOPHILS NFR BLD AUTO: 0.7 % (ref 0–1)
BILIRUB SERPL-MCNC: 5.2 MG/DL (ref ?–1.2)
BUN SERPL-MCNC: 21 MG/DL (ref 7–18)
BUN/CREAT SERPL: 15 (ref 6–20)
CALCIUM SERPL-MCNC: 7.8 MG/DL (ref 8.5–10.1)
CHLORIDE SERPL-SCNC: 98 MMOL/L (ref 98–107)
CO2 SERPL-SCNC: 28 MMOL/L (ref 21–32)
CREAT SERPL-MCNC: 1.43 MG/DL (ref 0.7–1.3)
DIFFERENTIAL METHOD BLD: ABNORMAL
EGFR (NO RACE VARIABLE) (RUSH/TITUS): 60 ML/MIN/1.73M2
EOSINOPHIL # BLD AUTO: 0.71 K/UL (ref 0–0.5)
EOSINOPHIL NFR BLD AUTO: 6.5 % (ref 1–4)
ERYTHROCYTE [DISTWIDTH] IN BLOOD BY AUTOMATED COUNT: 18.4 % (ref 11.5–14.5)
GLOBULIN SER-MCNC: 4.4 G/DL (ref 2–4)
GLUCOSE SERPL-MCNC: 121 MG/DL (ref 74–106)
HCT VFR BLD AUTO: 24.6 % (ref 40–54)
HCT VFR BLD AUTO: 24.6 % (ref 40–54)
HCT VFR BLD AUTO: 25.1 % (ref 40–54)
HGB BLD-MCNC: 8.3 G/DL (ref 13.5–18)
HGB BLD-MCNC: 8.3 G/DL (ref 13.5–18)
HGB BLD-MCNC: 8.5 G/DL (ref 13.5–18)
IMM GRANULOCYTES # BLD AUTO: 0.06 K/UL (ref 0–0.04)
IMM GRANULOCYTES NFR BLD: 0.6 % (ref 0–0.4)
INR BLD: 1.93
LYMPHOCYTES # BLD AUTO: 1.04 K/UL (ref 1–4.8)
LYMPHOCYTES NFR BLD AUTO: 9.6 % (ref 27–41)
MCH RBC QN AUTO: 30.3 PG (ref 27–31)
MCHC RBC AUTO-ENTMCNC: 33.7 G/DL (ref 32–36)
MCV RBC AUTO: 89.8 FL (ref 80–96)
MONOCYTES # BLD AUTO: 2.2 K/UL (ref 0–0.8)
MONOCYTES NFR BLD AUTO: 20.3 % (ref 2–6)
MPC BLD CALC-MCNC: 11.1 FL (ref 9.4–12.4)
NEUTROPHILS # BLD AUTO: 6.76 K/UL (ref 1.8–7.7)
NEUTROPHILS NFR BLD AUTO: 62.3 % (ref 53–65)
NRBC # BLD AUTO: 0 X10E3/UL
NRBC, AUTO (.00): 0 %
PLATELET # BLD AUTO: 104 K/UL (ref 150–400)
POTASSIUM SERPL-SCNC: 4 MMOL/L (ref 3.5–5.1)
PROT SERPL-MCNC: 6 G/DL (ref 6.4–8.2)
PROTHROMBIN TIME: 21.7 SECONDS (ref 11.7–14.7)
RBC # BLD AUTO: 2.74 M/UL (ref 4.6–6.2)
SODIUM SERPL-SCNC: 131 MMOL/L (ref 136–145)
WBC # BLD AUTO: 10.85 K/UL (ref 4.5–11)

## 2023-09-05 PROCEDURE — 85025 COMPLETE CBC W/AUTO DIFF WBC: CPT | Performed by: STUDENT IN AN ORGANIZED HEALTH CARE EDUCATION/TRAINING PROGRAM

## 2023-09-05 PROCEDURE — 85014 HEMATOCRIT: CPT

## 2023-09-05 PROCEDURE — 25000003 PHARM REV CODE 250

## 2023-09-05 PROCEDURE — 85610 PROTHROMBIN TIME: CPT | Performed by: STUDENT IN AN ORGANIZED HEALTH CARE EDUCATION/TRAINING PROGRAM

## 2023-09-05 PROCEDURE — 97162 PT EVAL MOD COMPLEX 30 MIN: CPT

## 2023-09-05 PROCEDURE — 99233 SBSQ HOSP IP/OBS HIGH 50: CPT | Mod: GC,,, | Performed by: HOSPITALIST

## 2023-09-05 PROCEDURE — 25000003 PHARM REV CODE 250: Performed by: STUDENT IN AN ORGANIZED HEALTH CARE EDUCATION/TRAINING PROGRAM

## 2023-09-05 PROCEDURE — 11000001 HC ACUTE MED/SURG PRIVATE ROOM

## 2023-09-05 PROCEDURE — 82140 ASSAY OF AMMONIA: CPT | Performed by: INTERNAL MEDICINE

## 2023-09-05 PROCEDURE — 80053 COMPREHEN METABOLIC PANEL: CPT | Performed by: STUDENT IN AN ORGANIZED HEALTH CARE EDUCATION/TRAINING PROGRAM

## 2023-09-05 PROCEDURE — 99233 PR SUBSEQUENT HOSPITAL CARE,LEVL III: ICD-10-PCS | Mod: GC,,, | Performed by: HOSPITALIST

## 2023-09-05 RX ORDER — THIAMINE HCL 100 MG
100 TABLET ORAL DAILY
Status: DISCONTINUED | OUTPATIENT
Start: 2023-09-06 | End: 2023-09-09 | Stop reason: HOSPADM

## 2023-09-05 RX ORDER — PANTOPRAZOLE SODIUM 40 MG/1
40 TABLET, DELAYED RELEASE ORAL DAILY
Status: DISCONTINUED | OUTPATIENT
Start: 2023-09-06 | End: 2023-09-09 | Stop reason: HOSPADM

## 2023-09-05 RX ADMIN — OXYCODONE AND ACETAMINOPHEN 1 TABLET: 10; 325 TABLET ORAL at 02:09

## 2023-09-05 RX ADMIN — FOLIC ACID 1 MG: 1 TABLET ORAL at 10:09

## 2023-09-05 RX ADMIN — LACTULOSE 10 G: 20 SOLUTION ORAL at 10:09

## 2023-09-05 RX ADMIN — LACTULOSE 10 G: 20 SOLUTION ORAL at 08:09

## 2023-09-05 RX ADMIN — OXYCODONE AND ACETAMINOPHEN 1 TABLET: 10; 325 TABLET ORAL at 10:09

## 2023-09-05 RX ADMIN — PROPRANOLOL HYDROCHLORIDE 10 MG: 10 TABLET ORAL at 08:09

## 2023-09-05 RX ADMIN — RIFAXIMIN 550 MG: 550 TABLET ORAL at 08:09

## 2023-09-05 RX ADMIN — RIFAXIMIN 550 MG: 550 TABLET ORAL at 10:09

## 2023-09-05 NOTE — ASSESSMENT & PLAN NOTE
Likely secondary to liver cirrhosis    Per Dr. Gusman:  Active arterial bleeding on endoscopy in anal canal; too friable for clip. (In OR) Arterial bleeding at the 9 o'clock position in the anal canal; multiple bleeding hemorrhoidal plexus at 6 o'clock position on the posterior canal and at 3 o'clock position    Patient received 2 units of packed red blood cells in the OR and we will give FFP     total units of blood received 8 units.   8/25: No bleeding overnight  8/26/23 no obvious bleeding on rectal dressing    8/27: Rectal bleeding noted this morning. Rectal dressing/pad saturated with blod and clots. 1U PRBC ordered.   8/30: rectal bleeding this morning one unit of RBC ordered.   8/31: rectal bleeding this morning one unit of RBC ordered.   9/1: 1 unit of blood over night   9/3: received one unit of FFP yesterday and vitamin K, H/H this morning 5.4/16.5, will transfuse with 2 units pRBCs  9/4: H/H 8.4/24.6, will continue to monitor.   9/5: H&H stable 8.7/26.2 and will continue to  monitor

## 2023-09-05 NOTE — PROGRESS NOTES
Gastroenterology Consult Note    Chief Complaint: Bleeding hemorrhoid    Consulted by:   Dr. Beckford    HPI:  John Spears is a 47 yo male with hx of daily ETOH use for years (12 pack beer /day for over 5 years) is admitted with c/o daily bright red hematochezia for the past 2 weeks. He denies abdominal pain, N/V, hematemesis or melena. He had Hgb 6 on admission and has received 1 unit RBC's with repeat hgb 6 and just completed 2nd unit of blood. Hemodynamics have been stable. Liver tests noted to be abnormal with Tbili 5.5, albumin 1.8, AST 56, ALT 36. He has previously been told he had fatty liver but not aware of any other liver problems. He has never had endoscopy. He denies previous hx of blood transfusion or IVDA. Patient went to OR with Dr. Gusman for control of bleeding - suspected rectal varices vs IH and proceeded with banding and fulguration, but reports bands came off.     INTERVAL  - doing ok on rounds, alert and talking  - had stool yesterday and today without blood  - did require transfusions over the weekend for rectal bleeding and transfer process was started     Review of Systems   Constitutional:  Negative for weight loss.   Gastrointestinal:  Negative for abdominal pain, blood in stool, constipation, diarrhea, heartburn, melena, nausea and vomiting.   All other systems reviewed and are negative.      Past Medical History:   Diagnosis Date    Fatty liver     History of alcohol abuse     Hypertension     Mixed hyperlipidemia     Thyroid disease      Past Surgical History:   Procedure Laterality Date    ABDOMINAL SURGERY      DIGITAL RECTAL EXAMINATION UNDER ANESTHESIA N/A 8/20/2023    Procedure: EXAM UNDER ANESTHESIA, DIGITAL, RECTUM;  Surgeon: Michael Gusman DO;  Location: [facility] OR;  Service: General;  Laterality: N/A;    DIGITAL RECTAL EXAMINATION UNDER ANESTHESIA N/A 8/24/2023    Procedure: EXAM UNDER ANESTHESIA, DIGITAL, RECTUM;  Surgeon: Michael Gusman DO;  Location: [facility]  "OR;  Service: General;  Laterality: N/A;    EXCISIONAL HEMORRHOIDECTOMY N/A 8/20/2023    Procedure: HEMORRHOIDECTOMY;  Surgeon: Michael Gusman DO;  Location: [facility] OR;  Service: General;  Laterality: N/A;    EXCISIONAL HEMORRHOIDECTOMY N/A 8/24/2023    Procedure: HEMORRHOIDECTOMY;  Surgeon: Michael Gusman DO;  Location: [facility] OR;  Service: General;  Laterality: N/A;    RETURN TO OPERATING ROOM, FOR MINOR POSTOPERATIVE HEMORRHAGE CONTROL  8/24/2023    Procedure: RETURN TO OPERATING ROOM, FOR MINOR POSTOPERATIVE HEMORRHAGE CONTROL;  Surgeon: Michael Gusman DO;  Location: [facility] OR;  Service: General;;     History reviewed. No pertinent family history.    OBJECTIVE:  /63   Pulse 79   Temp 98.8 °F (37.1 °C)   Resp 17   Ht 5' 5" (1.651 m)   Wt (!) 156.5 kg (345 lb 0.3 oz)   SpO2 98%   BMI 57.41 kg/m²   GEN: chronically ill appearing, obese, cooperative, NAD  HEENT: NCAT, OP benign, MMM  NECK: Supple, no LAD  CV: normal rate, regular rhythm  RESP: CTA anteriorly, unlabored  ABD: NABS, ND, NT, soft, no guarding  EXT: No clubbing, cyanosis; +edema to the thighs   SKIN: Warm and dry  NEURO: AAO x4, no asterixis     LABS:  Recent Results (from the past 336 hour(s))   CBC with Differential    Collection Time: 09/04/23  4:00 AM   Result Value Ref Range    WBC 15.00 (H) 4.50 - 11.00 K/uL    Hemoglobin 8.2 (L) 13.5 - 18.0 g/dL    Hematocrit 25.1 (L) 40.0 - 54.0 %    Platelet Count 93 (L) 150 - 400 K/uL   CBC with Differential    Collection Time: 09/03/23  9:55 AM   Result Value Ref Range    WBC 13.54 (H) 4.50 - 11.00 K/uL    Hemoglobin 7.4 (L) 13.5 - 18.0 g/dL    Hematocrit 21.7 (L) 40.0 - 54.0 %    Platelet Count 86 (L) 150 - 400 K/uL   CBC with Differential    Collection Time: 09/01/23  5:43 AM   Result Value Ref Range    WBC 15.71 (H) 4.50 - 11.00 K/uL    Hemoglobin 8.2 (L) 13.5 - 18.0 g/dL    Hematocrit 24.3 (L) 40.0 - 54.0 %    Platelet Count 95 (L) 150 - 400 K/uL     CMP  Sodium   Date Value " Ref Range Status   09/04/2023 133 (L) 136 - 145 mmol/L Final     Potassium   Date Value Ref Range Status   09/04/2023 3.9 3.5 - 5.1 mmol/L Final     Chloride   Date Value Ref Range Status   09/04/2023 98 98 - 107 mmol/L Final     CO2   Date Value Ref Range Status   09/04/2023 27 21 - 32 mmol/L Final     Glucose   Date Value Ref Range Status   09/04/2023 78 74 - 106 mg/dL Final     BUN   Date Value Ref Range Status   09/04/2023 29 (H) 7 - 18 mg/dL Final     Creatinine   Date Value Ref Range Status   09/04/2023 1.66 (H) 0.70 - 1.30 mg/dL Final     Calcium   Date Value Ref Range Status   09/04/2023 7.7 (L) 8.5 - 10.1 mg/dL Final     Total Protein   Date Value Ref Range Status   09/04/2023 5.8 (L) 6.4 - 8.2 g/dL Final     Albumin   Date Value Ref Range Status   09/04/2023 1.8 (L) 3.5 - 5.0 g/dL Final     Bilirubin, Total   Date Value Ref Range Status   09/04/2023 4.4 (H) >0.0 - 1.2 mg/dL Final     Alk Phos   Date Value Ref Range Status   09/04/2023 168 (H) 45 - 115 U/L Final     AST   Date Value Ref Range Status   09/04/2023 71 (H) 15 - 37 U/L Final     ALT   Date Value Ref Range Status   09/04/2023 51 16 - 61 U/L Final     Anion Gap   Date Value Ref Range Status   09/04/2023 12 7 - 16 mmol/L Final     eGFR   Date Value Ref Range Status   09/04/2023 50 (L) >=60 mL/min/1.73m2 Final     Lab Results   Component Value Date    INR 1.78 09/04/2023    INR 1.77 09/03/2023    INR 1.88 09/02/2023       IMAGING:    Abd US  Nodular contour of the liver suspicious for cirrhosis. Small volume biliary sludge/stones.  There is nonspecific gallbladder wall thickening which can be seen in the setting of liver disease. Consider nuclear medicine hepatobiliary imaging study for further evaluation.  Borderline prominence of the common duct.  Correlate with lab values including bilirubin.  If clinically indicated, MRCP or ERCP may be of benefit for further evaluation.    EGD  Moderate abnormal mucosa in the body of the stomach and antrum,  consistent with gastritis; performed cold forceps biopsies to rule out H. pylori  The upper third of the esophagus, middle third of the esophagus, lower third of the esophagus, Z-line, cardia, fundus of the stomach, body of the stomach, incisura, antrum, duodenal bulb, 1st part of the duodenum and 2nd part of the duodenum appeared normal.  No varices, blood/bleeding, or peptic ulcer     Flex Sig  The descending colon and sigmoid colon, proximal rectum and mid rectum appeared normal allowing for limited views (blood/clot).  Active bleeding from an unroofed vessel in the posterior anal canal below the dentate line    ASSESSMENT:    49 yo obese AAM with longstanding EtOH dependence admitted with rectal bleeding and newly diagnosed cirrhosis.     PLAN:    Hematochezia  - bleeding from unroofed vessel in the anal canal and external hemorrhoids; no rectal varices  - s/p OR with Dr. 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, NAFLD  - suggestive by radiographic appearance, thrombocytopenia  - Proceed with chronic liver workup: ordered  - Vaccinations: HAV immune; recommend HBV immunization     - MELD: 27  - Ascites: none, but does have fair amount of LE edema - defer to primary team for management   - Varices: no varices; repeat EGD 2y unless he decompensates   - Encephalopathy: no obvious HE; has had EtOH withdrawal and confusion with narcotics from pain control, but unclear that any of this was HE; on lactulose, rifaximin   - HCC Surveilance: no mass on US 8/2023  - Transplant candidate: encouraged alcohol abstinence; I did discuss with patient and wife that EtOH was related to liver disease and continued alcohol intake would lead to worsening of liver disease and that continued alcohol  use may prevent him from getting a transplant in the future. Prior to my discussion with him, he had never been counseled by a GI physician. Now has insurance in place and with high MELD. Agree with transfer to [facility] for [facility] - I discussed case with Dr. Brooke earlier today, and he accepted patient for transfer.       Hyperbilirubinemia  - borderline CBD on US, but MRCP with no CBD dilatation  - MRCP limited by motion artifact  - has been relatively stable, not worsening; related to underlying liver disease and alcoholic hepatitis       Thank you for the consult and including me in the care of this patient. Please call me with questions.     Real Ng MD  Gastroenterology

## 2023-09-05 NOTE — SUBJECTIVE & OBJECTIVE
Interval History: Patient was seen and evaluated in bed with no acute event overnight. His condition remained grossely the same but H&H is stable. He is pending transfer and we will call the transfer center to get an update.    Review of Systems   Constitutional:  Positive for activity change. Negative for fatigue.   Respiratory:  Negative for chest tightness and shortness of breath.    Cardiovascular:  Positive for leg swelling. Negative for chest pain.   Gastrointestinal:  Positive for abdominal distention and anal bleeding.   Skin:  Negative for rash and wound.   Neurological:  Negative for tremors.     Objective:     Vital Signs (Most Recent):  Temp: 98.9 °F (37.2 °C) (09/05/23 1103)  Pulse: 70 (09/05/23 1103)  Resp: 18 (09/05/23 1414)  BP: 123/74 (09/05/23 1103)  SpO2: 99 % (09/05/23 1103) Vital Signs (24h Range):  Temp:  [97.7 °F (36.5 °C)-98.9 °F (37.2 °C)] 98.9 °F (37.2 °C)  Pulse:  [67-75] 70  Resp:  [16-20] 18  SpO2:  [97 %-100 %] 99 %  BP: ()/(49-85) 123/74     Weight: (!) 156.5 kg (345 lb 0.3 oz)  Body mass index is 57.41 kg/m².    Intake/Output Summary (Last 24 hours) at 9/5/2023 1444  Last data filed at 9/5/2023 0111  Gross per 24 hour   Intake --   Output 2500 ml   Net -2500 ml         Physical Exam  Vitals and nursing note reviewed.   Constitutional:       Appearance: He is obese. He is ill-appearing.      Comments: Pt is somnolent but aroused easily   HENT:      Head: Normocephalic.      Right Ear: External ear normal.      Left Ear: External ear normal.      Nose: Nose normal.      Mouth/Throat:      Mouth: Mucous membranes are moist.      Pharynx: Oropharynx is clear.   Eyes:      General: Scleral icterus present.   Cardiovascular:      Rate and Rhythm: Normal rate and regular rhythm.      Heart sounds: Normal heart sounds.   Pulmonary:      Breath sounds: Normal breath sounds.   Abdominal:      General: There is distension.   Musculoskeletal:      Right lower leg: Edema present.      Left  lower leg: Edema present.   Skin:     General: Skin is warm and dry.      Capillary Refill: Capillary refill takes less than 2 seconds.   Neurological:      Mental Status: He is oriented to person, place, and time.             Significant Labs: All pertinent labs within the past 24 hours have been reviewed.    Significant Imaging: I have reviewed all pertinent imaging results/findings within the past 24 hours.  I have reviewed and interpreted all pertinent imaging results/findings within the past 24 hours.

## 2023-09-05 NOTE — ASSESSMENT & PLAN NOTE
MELD score 22 points. 19.6% with estimated 3 month mortality  CIWA score 0    MRCP reveals cirrhosis and multiple gallstones    8/27: Pt is noted to have scleral icterus, bili 5.5, and continued hematochezia.   Put in for transfer ([facility] order) for higher level of care. At 13:25, [facility] has no beds available, per Kaylee Perales RN. At 13:56, [facility] per Dr. Riggs has no available beds & is on diversion.  Call [facility] on 8/28 to see if any beds are available [phone]   8/30: pending transfer from [facility].   8/31: pending transfer.   9/1: pending transfer  9/3; pending transfer, no beds available at this time.   9/5: pending transfer

## 2023-09-05 NOTE — NURSING
Patient refused morning labs because he no longer wants to be stuck. Called ICU to inquire if someone could place another extended dwell midline to facilitate blood draws but no one was available. Requested they call back if someone does become available.

## 2023-09-05 NOTE — PT/OT/SLP EVAL
Physical Therapy Evaluation     Patient Name: John Spears   MRN: [id number]  Recent Surgery: Procedure(s) (LRB):  EXAM UNDER ANESTHESIA, DIGITAL, RECTUM (N/A)  RETURN TO OPERATING ROOM, FOR MINOR POSTOPERATIVE HEMORRHAGE CONTROL  HEMORRHOIDECTOMY (N/A) 12 Days Post-Op    Recommendations:     Discharge Recommendations: other (see comments) (planned to transfer to higher level of care)   Discharge Equipment Recommendations: to be determined by next level of care   Barriers to discharge: Ongoing medical treatment    Assessment:     John Spears is a 48 y.o. male admitted with a medical diagnosis of Bleeding hemorrhoid. He presents with the following impairments/functional limitations: weakness, impaired endurance, impaired functional mobility, decreased lower extremity function, impaired cardiopulmonary response to activity. Pt reports he is supposed to be tranferred to [facility] once a bed becomes available. Pt presents with significant pitting edema from toes to chest. Pt is able to complete ambulation in room with some level of supervision.     Rehab Prognosis: Good; patient would benefit from acute PT services to address these deficits and reach maximum level of function.    Plan:     During this hospitalization, patient to be seen 5 x/week to address the above listed problems via gait training, therapeutic activities, therapeutic exercises    Plan of Care Expires: 10/05/23    Subjective     Chief Complaint: bleeding hemorrhoid  Patient Comments/Goals: agreeable to eval  Pain/Comfort:  Pain Rating 1: 0/10    Social History:  Living Environment: Patient lives with their spouse and child(mony) in a single story home with number of outside stair(s): 0  Prior Level of Function: Prior to admission, patient was independent and driving  Equipment Used at Home: none  DME owned (not currently used): none  Assistance Upon Discharge: facility staff    Objective:     Communicated with GIO Carrasco LPN prior to session.  Patient found HOB elevated with telemetry, Rachel[place] upon PT entry to room.    General Precautions: Standard, fall   Orthopedic Precautions: N/A   Braces: N/A    Respiratory Status: Room air    Exams:  Cognition: Patient is oriented to Person, Place, Time, Situation  RLE ROM:  limited by pitting edema  RLE Strength:  3+/5 grossly  LLE ROM:  same as above  LLE Strength:  same as above  Sensation:    -       Intact  Skin Integrity/Edema:     -       Edema: Pitting BLE to upper abdomen    Functional Mobility:  Gait belt applied - Yes  Bed Mobility  Supine to Sit: minimum assistance for LE management  Sit to Supine: minimum assistance for LE management  Transfers  Sit to Stand: contact guard assistance with hand-held assist and with cues for hand placement and foot placement  Gait  Patient ambulated sidesteps to HOB with hand-held assist and contact guard assistance. Patient demonstrates decreased step length, wide base of support, decreased weight shift, and decreased jeannie. . All lines remained intact throughout ambulation trail.  Balance  Sitting: stand by assistance  Standing: contact guard assistance      Therapeutic Activities and Exercises:   Patient educated on role of acute care PT and PT POC, safety while in hospital including calling nurse for mobility, and call light usage  Patient educated about importance of OOB mobility and remaining up in chair most of the day.      AM-PAC 6 CLICK MOBILITY  Total Score:16    Patient left HOB elevated with all lines intact, call button in reach, family present, and CNA notified .    GOALS:   Multidisciplinary Problems       Physical Therapy Goals          Problem: Physical Therapy    Goal Priority Disciplines Outcome Goal Variances Interventions   Physical Therapy Goal     PT, PT/OT Ongoing, Progressing     Description: Short Term Goals to be met by: 23    Patient will increase functional independence with mobility by performin. Supine to sit with Stand by  assist  2. Sit to stand transfer with Stand by assist using Rolling walker  3. Bed to chair transfer with Stand by assist using standard walker  4. Gait  x 100 feet with Stand by assist using Rolling walker  5. Lower extremity exercise program x30 reps per handout, with assistance as needed    Long Term Goals to be met by: 10/5/23    Pt will regain full independent functional mobility with Rolling walker to return to home situation and prior activities of daily living.                        History:     Past Medical History:   Diagnosis Date    Fatty liver     History of alcohol abuse     Hypertension     Mixed hyperlipidemia     Thyroid disease        Past Surgical History:   Procedure Laterality Date    ABDOMINAL SURGERY      DIGITAL RECTAL EXAMINATION UNDER ANESTHESIA N/A 8/20/2023    Procedure: EXAM UNDER ANESTHESIA, DIGITAL, RECTUM;  Surgeon: Michael Gusman DO;  Location: [facility];  Service: General;  Laterality: N/A;    DIGITAL RECTAL EXAMINATION UNDER ANESTHESIA N/A 8/24/2023    Procedure: EXAM UNDER ANESTHESIA, DIGITAL, RECTUM;  Surgeon: Michael Gusman DO;  Location: [facility] OR;  Service: General;  Laterality: N/A;    EXCISIONAL HEMORRHOIDECTOMY N/A 8/20/2023    Procedure: HEMORRHOIDECTOMY;  Surgeon: Michael Gusman DO;  Location: [facility] OR;  Service: General;  Laterality: N/A;    EXCISIONAL HEMORRHOIDECTOMY N/A 8/24/2023    Procedure: HEMORRHOIDECTOMY;  Surgeon: Michael Gusman DO;  Location: [facility] OR;  Service: General;  Laterality: N/A;    RETURN TO OPERATING ROOM, FOR MINOR POSTOPERATIVE HEMORRHAGE CONTROL  8/24/2023    Procedure: RETURN TO OPERATING ROOM, FOR MINOR POSTOPERATIVE HEMORRHAGE CONTROL;  Surgeon: Michael Gusman DO;  Location: [facility];  Service: General;;       Time Tracking:     PT Received On: 09/05/23  PT Start Time: 1128  PT Stop Time: 1142  PT Total Time (min): 14 min     Billable Minutes: Evaluation moderate complexity    9/5/2023

## 2023-09-05 NOTE — PLAN OF CARE
Problem: Physical Therapy  Goal: Physical Therapy Goal  Description: Short Term Goals to be met by: 23    Patient will increase functional independence with mobility by performin. Supine to sit with Stand by assist  2. Sit to stand transfer with Stand by assist using Rolling walker  3. Bed to chair transfer with Stand by assist using standard walker  4. Gait  x 100 feet with Stand by assist using Rolling walker  5. Lower extremity exercise program x30 reps per handout, with assistance as needed    Long Term Goals to be met by: 10/5/23    Pt will regain full independent functional mobility with Rolling walker to return to home situation and prior activities of daily living.   Outcome: Ongoing, Progressing    PT eval completed. Please see eval note for details on current mobility status.

## 2023-09-05 NOTE — ASSESSMENT & PLAN NOTE
Acute blood loss       Per significant other, had hematochezia 3x this morning. Noted pad saturated with blood and clots.  Hgb 7.2. 1U PRBC noted. Dr. Mahajan informed.   Monitor CBC  8/30: Hgb in morning was 6.1, one unit of Rbc ordered on noon check it was 7.2 will get H/H q 6hr, will continue to monitor and transfuse appropriately, will hopefully transfer soon.   8/31: Hgb this morning was 6.1, one unit of RBC ordered, at noon H/H was 7.3 will continue to trend H/H  9/1: Hgb 8.8 this morning required one unit of RBC last night.   9/3: transfused one unit of FFP yesterday. H/H this morning 5.4/16.5, will transfuse with 2 units pRBCs  9/4 H/H 8.4/24/6, will continue to monitor.   9/5: H&H 8.7/26.2 and will continue to monitor

## 2023-09-05 NOTE — PROGRESS NOTES
[facility] Tele[facility] Hospital Medicine  Progress Note    Patient Name: John Spears  MRN: [id number]  Patient Class: IP- Inpatient   Admission Date: 8/19/2023  Length of Stay: 16 days  Attending Physician: Karen Chamorro MD  Primary Care Provider: Ruben Provider        Subjective:     Principal Problem:Bleeding hemorrhoid        HPI:  Patient is a 49 y/o male with PMH of HTN, HLD, cirrhosis of the liver and hemorrhoids who presents to the ED with complaint of COB and swelling of the abdomen, legs and feet that started about 1 month ago. Patient states that the swelling and SOB has been worsening for the last 2 weeks. Patient reports passing dark blood per rectum with bowel movement for the last 2 weeks. Patient reports having intermittent RUQ when he eats fried foot. Patient reports chills but no fever. Patient denies nausea, vomiting, chest pain or urinary changes. Patient reports drinking about 10 beers of 16 ounces per day for the 3 years and for the last months he stopped drinking beer and started drinking  liquor. He states that 1 fifth of liquor lasts for 3 days. Patient reports withdrawal symptoms in the past. Patient reports that he quit smoking cigarettes 5 years ago.      In the ED showed /67, HR 90, RR 16, SpO2 98% and afebrile. Blood work showed H/H 6.1/19, WBC 5.6, PLT 78, PT 21.2, INR 1.87, PTT 48.2. Sodium 134, potassium 2.8, Alkaline phosphatase 166, Total bilirrubin 6.5, AST/ALST 61/41, p-, troponin 16.7. UA negative for infection or blood. FOBT negative. EKG nsr with HR 89. Patient is receiving PRBC in the ER at this moment and electrolytes are being replenished. Patient will be admitted to the hospital for further management.       Overview/Hospital Course:  No notes on file    Interval History: Patient was seen and evaluated in bed with no acute event overnight. His condition remained grossely the same but H&H is stable. He is pending  transfer and we will call the transfer center to get an update.    Review of Systems   Constitutional:  Positive for activity change. Negative for fatigue.   Respiratory:  Negative for chest tightness and shortness of breath.    Cardiovascular:  Positive for leg swelling. Negative for chest pain.   Gastrointestinal:  Positive for abdominal distention and anal bleeding.   Skin:  Negative for rash and wound.   Neurological:  Negative for tremors.     Objective:     Vital Signs (Most Recent):  Temp: 98.9 °F (37.2 °C) (09/05/23 1103)  Pulse: 70 (09/05/23 1103)  Resp: 18 (09/05/23 1414)  BP: 123/74 (09/05/23 1103)  SpO2: 99 % (09/05/23 1103) Vital Signs (24h Range):  Temp:  [97.7 °F (36.5 °C)-98.9 °F (37.2 °C)] 98.9 °F (37.2 °C)  Pulse:  [67-75] 70  Resp:  [16-20] 18  SpO2:  [97 %-100 %] 99 %  BP: ()/(49-85) 123/74     Weight: (!) 156.5 kg (345 lb 0.3 oz)  Body mass index is 57.41 kg/m².    Intake/Output Summary (Last 24 hours) at 9/5/2023 1444  Last data filed at 9/5/2023 0111  Gross per 24 hour   Intake --   Output 2500 ml   Net -2500 ml         Physical Exam  Vitals and nursing note reviewed.   Constitutional:       Appearance: He is obese. He is ill-appearing.      Comments: Pt is somnolent but aroused easily   HENT:      Head: Normocephalic.      Right Ear: External ear normal.      Left Ear: External ear normal.      Nose: Nose normal.      Mouth/Throat:      Mouth: Mucous membranes are moist.      Pharynx: Oropharynx is clear.   Eyes:      General: Scleral icterus present.   Cardiovascular:      Rate and Rhythm: Normal rate and regular rhythm.      Heart sounds: Normal heart sounds.   Pulmonary:      Breath sounds: Normal breath sounds.   Abdominal:      General: There is distension.   Musculoskeletal:      Right lower leg: Edema present.      Left lower leg: Edema present.   Skin:     General: Skin is warm and dry.      Capillary Refill: Capillary refill takes less than 2 seconds.   Neurological:       Mental Status: He is oriented to person, place, and time.             Significant Labs: All pertinent labs within the past 24 hours have been reviewed.    Significant Imaging: I have reviewed all pertinent imaging results/findings within the past 24 hours.  I have reviewed and interpreted all pertinent imaging results/findings within the past 24 hours.      Assessment/Plan:      * Bleeding hemorrhoid  Likely secondary to liver cirrhosis    Per Dr. Gusman:  Active arterial bleeding on endoscopy in anal canal; too friable for clip. (In OR) Arterial bleeding at the 9 o'clock position in the anal canal; multiple bleeding hemorrhoidal plexus at 6 o'clock position on the posterior canal and at 3 o'clock position    Patient received 2 units of packed red blood cells in the OR and we will give FFP     total units of blood received 8 units.   8/25: No bleeding overnight  8/26/23 no obvious bleeding on rectal dressing    8/27: Rectal bleeding noted this morning. Rectal dressing/pad saturated with blod and clots. 1U PRBC ordered.   8/30: rectal bleeding this morning one unit of RBC ordered.   8/31: rectal bleeding this morning one unit of RBC ordered.   9/1: 1 unit of blood over night   9/3: received one unit of FFP yesterday and vitamin K, H/H this morning 5.4/16.5, will transfuse with 2 units pRBCs  9/4: H/H 8.4/24.6, will continue to monitor.   9/5: H&H stable 8.7/26.2 and will continue to  monitor    Hyperammonemia  8/27: Continue with rifaxmin  Started on lactulose on 8/26  Ammonia level 61  Will continue to monitor      Acute lower GI bleeding  Patient received 2 units of packed red blood cells in the OR and we will give FFP  8/25: Hgb 7.8  8/26/23 no obvious rectal bleeding  8/30: 1 unit of blood ordered.   8/31: 1 unit of blood   9/1; 1 unit of blood overnight  9/3: 1 unit of FFP yesterday. H/H this morning 5.4/16.5, will transfuse with 2 units pRBCs  9/4: H/H 8.4/24.6, will continue to monitor.      Hypertension  Monitor BP    Alcoholic cirrhosis of liver  MELD score 22 points. 19.6% with estimated 3 month mortality  CIWA score 0    MRCP reveals cirrhosis and multiple gallstones    8/27: Pt is noted to have scleral icterus, bili 5.5, and continued hematochezia.   Put in for transfer ([facility] order) for higher level of care. At 13:25, [facility] has no beds available, per Kaylee Perales RN. At 13:56, [facility] per Dr. Riggs has no available beds & is on diversion.  Call [facility] on 8/28 to see if any beds are available [phone]   8/30: pending transfer from [facility].   8/31: pending transfer.   9/1: pending transfer  9/3; pending transfer, no beds available at this time.   9/5: pending transfer      Anemia  Acute blood loss       Per significant other, had hematochezia 3x this morning. Noted pad saturated with blood and clots.  Hgb 7.2. 1U PRBC noted. Dr. Mahajan informed.   Monitor CBC  8/30: Hgb in morning was 6.1, one unit of Rbc ordered on noon check it was 7.2 will get H/H q 6hr, will continue to monitor and transfuse appropriately, will hopefully transfer soon.   8/31: Hgb this morning was 6.1, one unit of RBC ordered, at noon H/H was 7.3 will continue to trend H/H  9/1: Hgb 8.8 this morning required one unit of RBC last night.   9/3: transfused one unit of FFP yesterday. H/H this morning 5.4/16.5, will transfuse with 2 units pRBCs  9/4 H/H 8.4/24/6, will continue to monitor.   9/5: H&H 8.7/26.2 and will continue to monitor    Hypoalbuminemia  Likely from malturition + or - synthetic function of the liver  C/w Spironolactone and lasix    VTE Risk Mitigation (From admission, onward)         Ordered     Reason for No Pharmacological VTE Prophylaxis  Once        Question:  Reasons:  Answer:  Active Bleeding    08/20/23 0043     IP VTE HIGH RISK PATIENT  Once         08/20/23 0043     Place sequential compression device  Until discontinued         08/20/23 0043                Discharge Planning   LUCRECIA:      Code Status: Full  Code   Is the patient medically ready for discharge?:     Reason for patient still in hospital (select all that apply): Treatment  Discharge Plan A: Home with family                  Luther Carvalho MD  Department of Hospital Medicine   [facility] - [facility] Tele[facility]

## 2023-09-05 NOTE — NURSING
Spoke with Pat from [facility]. Patient has been accepted to [facility] but still no bed currently. Dr. Chamorro and KRYSTIN llanos.

## 2023-09-05 NOTE — ASSESSMENT & PLAN NOTE
8/27: Continue with rifaxmin  Started on lactulose on 8/26  Ammonia level 61  Will continue to monitor

## 2023-09-06 LAB
ALBUMIN SERPL BCP-MCNC: 1.6 G/DL (ref 3.5–5)
ALBUMIN/GLOB SERPL: 0.4 {RATIO}
ALP SERPL-CCNC: 148 U/L (ref 45–115)
ALT SERPL W P-5'-P-CCNC: 66 U/L (ref 16–61)
ANION GAP SERPL CALCULATED.3IONS-SCNC: 8 MMOL/L (ref 7–16)
AST SERPL W P-5'-P-CCNC: 94 U/L (ref 15–37)
BASOPHILS # BLD AUTO: 0.12 K/UL (ref 0–0.2)
BASOPHILS NFR BLD AUTO: 1.1 % (ref 0–1)
BILIRUB SERPL-MCNC: 4.6 MG/DL (ref ?–1.2)
BUN SERPL-MCNC: 20 MG/DL (ref 7–18)
BUN/CREAT SERPL: 13 (ref 6–20)
CALCIUM SERPL-MCNC: 7.7 MG/DL (ref 8.5–10.1)
CHLORIDE SERPL-SCNC: 99 MMOL/L (ref 98–107)
CO2 SERPL-SCNC: 29 MMOL/L (ref 21–32)
CREAT SERPL-MCNC: 1.52 MG/DL (ref 0.7–1.3)
DIFFERENTIAL METHOD BLD: ABNORMAL
EGFR (NO RACE VARIABLE) (RUSH/TITUS): 55 ML/MIN/1.73M2
EOSINOPHIL # BLD AUTO: 0.9 K/UL (ref 0–0.5)
EOSINOPHIL NFR BLD AUTO: 7.9 % (ref 1–4)
ERYTHROCYTE [DISTWIDTH] IN BLOOD BY AUTOMATED COUNT: 18.6 % (ref 11.5–14.5)
GLOBULIN SER-MCNC: 4.3 G/DL (ref 2–4)
GLUCOSE SERPL-MCNC: 92 MG/DL (ref 74–106)
HCT VFR BLD AUTO: 23.5 % (ref 40–54)
HCT VFR BLD AUTO: 23.7 % (ref 40–54)
HCT VFR BLD AUTO: 24.3 % (ref 40–54)
HCT VFR BLD AUTO: 24.3 % (ref 40–54)
HCT VFR BLD AUTO: 25.1 % (ref 40–54)
HCT VFR BLD AUTO: 27.4 % (ref 40–54)
HGB BLD-MCNC: 7.9 G/DL (ref 13.5–18)
HGB BLD-MCNC: 8 G/DL (ref 13.5–18)
HGB BLD-MCNC: 8.3 G/DL (ref 13.5–18)
HGB BLD-MCNC: 8.9 G/DL (ref 13.5–18)
IMM GRANULOCYTES # BLD AUTO: 0.06 K/UL (ref 0–0.04)
IMM GRANULOCYTES NFR BLD: 0.5 % (ref 0–0.4)
INR BLD: 1.89
LYMPHOCYTES # BLD AUTO: 1.6 K/UL (ref 1–4.8)
LYMPHOCYTES NFR BLD AUTO: 14 % (ref 27–41)
MCH RBC QN AUTO: 30.5 PG (ref 27–31)
MCHC RBC AUTO-ENTMCNC: 34.2 G/DL (ref 32–36)
MCV RBC AUTO: 89.3 FL (ref 80–96)
MONOCYTES # BLD AUTO: 2.45 K/UL (ref 0–0.8)
MONOCYTES NFR BLD AUTO: 21.5 % (ref 2–6)
MPC BLD CALC-MCNC: 10.8 FL (ref 9.4–12.4)
NEUTROPHILS # BLD AUTO: 6.28 K/UL (ref 1.8–7.7)
NEUTROPHILS NFR BLD AUTO: 55 % (ref 53–65)
NRBC # BLD AUTO: 0 X10E3/UL
NRBC, AUTO (.00): 0 %
PLATELET # BLD AUTO: 99 K/UL (ref 150–400)
POTASSIUM SERPL-SCNC: 4 MMOL/L (ref 3.5–5.1)
PROT SERPL-MCNC: 5.9 G/DL (ref 6.4–8.2)
PROTHROMBIN TIME: 21.4 SECONDS (ref 11.7–14.7)
RBC # BLD AUTO: 2.72 M/UL (ref 4.6–6.2)
SODIUM SERPL-SCNC: 132 MMOL/L (ref 136–145)
WBC # BLD AUTO: 11.41 K/UL (ref 4.5–11)

## 2023-09-06 PROCEDURE — 25000003 PHARM REV CODE 250: Performed by: STUDENT IN AN ORGANIZED HEALTH CARE EDUCATION/TRAINING PROGRAM

## 2023-09-06 PROCEDURE — 25000003 PHARM REV CODE 250

## 2023-09-06 PROCEDURE — 85025 COMPLETE CBC W/AUTO DIFF WBC: CPT | Performed by: STUDENT IN AN ORGANIZED HEALTH CARE EDUCATION/TRAINING PROGRAM

## 2023-09-06 PROCEDURE — 85014 HEMATOCRIT: CPT | Performed by: HOSPITALIST

## 2023-09-06 PROCEDURE — 11000001 HC ACUTE MED/SURG PRIVATE ROOM

## 2023-09-06 PROCEDURE — 85610 PROTHROMBIN TIME: CPT | Performed by: STUDENT IN AN ORGANIZED HEALTH CARE EDUCATION/TRAINING PROGRAM

## 2023-09-06 PROCEDURE — 97116 GAIT TRAINING THERAPY: CPT

## 2023-09-06 PROCEDURE — 80053 COMPREHEN METABOLIC PANEL: CPT | Performed by: STUDENT IN AN ORGANIZED HEALTH CARE EDUCATION/TRAINING PROGRAM

## 2023-09-06 PROCEDURE — 85014 HEMATOCRIT: CPT

## 2023-09-06 PROCEDURE — 97110 THERAPEUTIC EXERCISES: CPT

## 2023-09-06 RX ORDER — FUROSEMIDE 40 MG/1
40 TABLET ORAL 2 TIMES DAILY
Status: DISCONTINUED | OUTPATIENT
Start: 2023-09-06 | End: 2023-09-09 | Stop reason: HOSPADM

## 2023-09-06 RX ORDER — SPIRONOLACTONE 25 MG/1
50 TABLET ORAL DAILY
Status: DISCONTINUED | OUTPATIENT
Start: 2023-09-07 | End: 2023-09-09 | Stop reason: HOSPADM

## 2023-09-06 RX ADMIN — OXYCODONE AND ACETAMINOPHEN 1 TABLET: 10; 325 TABLET ORAL at 09:09

## 2023-09-06 RX ADMIN — PANTOPRAZOLE SODIUM 40 MG: 40 TABLET, DELAYED RELEASE ORAL at 09:09

## 2023-09-06 RX ADMIN — FOLIC ACID 1 MG: 1 TABLET ORAL at 09:09

## 2023-09-06 RX ADMIN — THIAMINE HCL TAB 100 MG 100 MG: 100 TAB at 09:09

## 2023-09-06 RX ADMIN — LACTULOSE 10 G: 20 SOLUTION ORAL at 09:09

## 2023-09-06 RX ADMIN — PROPRANOLOL HYDROCHLORIDE 10 MG: 10 TABLET ORAL at 09:09

## 2023-09-06 RX ADMIN — SPIRONOLACTONE 25 MG: 25 TABLET ORAL at 09:09

## 2023-09-06 RX ADMIN — OXYCODONE AND ACETAMINOPHEN 1 TABLET: 10; 325 TABLET ORAL at 04:09

## 2023-09-06 RX ADMIN — FUROSEMIDE 40 MG: 40 TABLET ORAL at 05:09

## 2023-09-06 RX ADMIN — RIFAXIMIN 550 MG: 550 TABLET ORAL at 09:09

## 2023-09-06 NOTE — PT/OT/SLP PROGRESS
Physical Therapy Treatment    Patient Name:  John Spears   MRN:  [id number]    Recommendations:     Discharge Recommendations: other (see comments) (planned to transfer to higher level of care)  Discharge Equipment Recommendations: to be determined by next level of care  Barriers to discharge: None    Assessment:     John Spears is a 48 y.o. male admitted with a medical diagnosis of Bleeding hemorrhoid.  He presents with the following impairments/functional limitations: weakness, impaired endurance, impaired functional mobility, decreased lower extremity function, impaired cardiopulmonary response to activity pt was not so alert or eager to participate in PT. Pt req strong encouragement to work to max potential.     Rehab Prognosis: Good and Fair; patient would benefit from acute skilled PT services to address these deficits and reach maximum level of function.    Recent Surgery: Procedure(s) (LRB):  EXAM UNDER ANESTHESIA, DIGITAL, RECTUM (N/A)  RETURN TO OPERATING ROOM, FOR MINOR POSTOPERATIVE HEMORRHAGE CONTROL  HEMORRHOIDECTOMY (N/A) 13 Days Post-Op    Plan:     During this hospitalization, patient to be seen 5 x/week to address the identified rehab impairments via gait training, therapeutic activities, therapeutic exercises and progress toward the following goals:    Plan of Care Expires:  10/05/23    Subjective     Chief Complaint: bleeding hemorrhoid  Patient/Family Comments/goals: agreeable to PT  Pain/Comfort:  Pain Rating 1: 0/10  Pain Addressed 1: Reposition, Distraction, Cessation of Activity  Pain Rating Post-Intervention 1: 0/10      Objective:     Communicated with DANIELLE Turner prior to session.  Patient found HOB elevated with telemetry, [place] upon PT entry to room.     General Precautions: Standard, fall  Orthopedic Precautions: N/A  Braces: N/A  Respiratory Status: Room air     Functional Mobility:  Bed Mobility:     Scooting: stand by assistance  Supine to Sit: stand by  assistance  Transfers:     Sit to Stand:  x5 stand by assistance with no AD  Gait: pt ambulated 65ftx2 w/ RW, CGA, decreased jeannie & step length, wide base of support, labored breathing noted during gait  Balance: good in sitting & fair- in standing       AM-PAC 6 CLICK MOBILITY  Turning over in bed (including adjusting bedclothes, sheets and blankets)?: 4  Sitting down on and standing up from a chair with arms (e.g., wheelchair, bedside commode, etc.): 4  Moving from lying on back to sitting on the side of the bed?: 4  Moving to and from a bed to a chair (including a wheelchair)?: 4  Need to walk in hospital room?: 3  Climbing 3-5 steps with a railing?: 1  Basic Mobility Total Score: 20       Treatment & Education:  Bilateral lower extremity exercise x 20 reps: ankle pumps, Quad sets, glut sets, hip abduction/adduction, Long arc quads, and Marching with Stand by assist frequent rest breaks    Patient left up in chair with all lines intact, call button in reach, DANIELLE Turner notified, and friend present..    GOALS:   Multidisciplinary Problems       Physical Therapy Goals          Problem: Physical Therapy    Goal Priority Disciplines Outcome Goal Variances Interventions   Physical Therapy Goal     PT, PT/OT Ongoing, Progressing     Description: Short Term Goals to be met by: 23    Patient will increase functional independence with mobility by performin. Supine to sit with Stand by assist  2. Sit to stand transfer with Stand by assist using Rolling walker  3. Bed to chair transfer with Stand by assist using standard walker  4. Gait  x 100 feet with Stand by assist using Rolling walker  5. Lower extremity exercise program x30 reps per handout, with assistance as needed    Long Term Goals to be met by: 10/5/23    Pt will regain full independent functional mobility with Rolling walker to return to home situation and prior activities of daily living.                        Time Tracking:     PT Received On:  09/06/23  PT Start Time: 1020     PT Stop Time: 1050  PT Total Time (min): 30 min     Billable Minutes: Gait Training 12 and Therapeutic Exercise 18    Treatment Type: Treatment  PT/PTA: PT     Number of PTA visits since last PT visit: 0     09/06/2023

## 2023-09-06 NOTE — ASSESSMENT & PLAN NOTE
Likely secondary to liver cirrhosis    Per Dr. Gusman:  Active arterial bleeding on endoscopy in anal canal; too friable for clip. (In OR) Arterial bleeding at the 9 o'clock position in the anal canal; multiple bleeding hemorrhoidal plexus at 6 o'clock position on the posterior canal and at 3 o'clock position    Patient received 2 units of packed red blood cells in the OR and we will give FFP     total units of blood received 8 units.   8/25: No bleeding overnight  8/26/23 no obvious bleeding on rectal dressing    8/27: Rectal bleeding noted this morning. Rectal dressing/pad saturated with blod and clots. 1U PRBC ordered.   8/30: rectal bleeding this morning one unit of RBC ordered.   8/31: rectal bleeding this morning one unit of RBC ordered.   9/1: 1 unit of blood over night   9/3: received one unit of FFP yesterday and vitamin K, H/H this morning 5.4/16.5, will transfuse with 2 units pRBCs  9/4: H/H 8.4/24.6, will continue to monitor.   9/5: H&H stable 8.7/26.2 and will continue to  Monitor  9/6: H/H 8.3/24.3, will continue to monitor.

## 2023-09-06 NOTE — SUBJECTIVE & OBJECTIVE
Interval History: Patient is sitting comfortably in bed. Patient reports having one bowel movement overnight that did result in some bleeding after the bowel movement. Will continue to monitor. Hoping to transfer will reach out to [facility] today.     Review of Systems   Constitutional:  Positive for activity change. Negative for fatigue.   Respiratory:  Negative for chest tightness and shortness of breath.    Cardiovascular:  Positive for leg swelling. Negative for chest pain.   Gastrointestinal:  Positive for abdominal distention and anal bleeding.   Skin:  Negative for rash and wound.   Neurological:  Negative for tremors.     Objective:     Vital Signs (Most Recent):  Temp: 97.9 °F (36.6 °C) (09/06/23 0705)  Pulse: 82 (09/06/23 0705)  Resp: 18 (09/06/23 0705)  BP: 114/65 (09/06/23 0705)  SpO2: 98 % (09/06/23 0705) Vital Signs (24h Range):  Temp:  [97.5 °F (36.4 °C)-98.9 °F (37.2 °C)] 97.9 °F (36.6 °C)  Pulse:  [70-82] 82  Resp:  [16-18] 18  SpO2:  [98 %-99 %] 98 %  BP: (101-142)/(54-74) 114/65     Weight: (!) 156.5 kg (345 lb 0.3 oz)  Body mass index is 57.41 kg/m².  No intake or output data in the 24 hours ending 09/06/23 0852      Physical Exam  Vitals and nursing note reviewed.   Constitutional:       Appearance: He is obese. He is ill-appearing.      Comments: Pt is somnolent but aroused easily   HENT:      Head: Normocephalic.      Right Ear: External ear normal.      Left Ear: External ear normal.      Nose: Nose normal.      Mouth/Throat:      Mouth: Mucous membranes are moist.      Pharynx: Oropharynx is clear.   Eyes:      General: Scleral icterus present.   Cardiovascular:      Rate and Rhythm: Normal rate and regular rhythm.      Heart sounds: Normal heart sounds.   Pulmonary:      Breath sounds: Normal breath sounds.   Abdominal:      General: There is distension.   Musculoskeletal:      Right lower leg: Edema present.      Left lower leg: Edema present.   Skin:     General: Skin is warm and dry.       Capillary Refill: Capillary refill takes less than 2 seconds.   Neurological:      Mental Status: He is oriented to person, place, and time.             Significant Labs: All pertinent labs within the past 24 hours have been reviewed.    Significant Imaging: I have reviewed all pertinent imaging results/findings within the past 24 hours.

## 2023-09-06 NOTE — ASSESSMENT & PLAN NOTE
Acute blood loss       Per significant other, had hematochezia 3x this morning. Noted pad saturated with blood and clots.  Hgb 7.2. 1U PRBC noted. Dr. Mahajan informed.   Monitor CBC  8/30: Hgb in morning was 6.1, one unit of Rbc ordered on noon check it was 7.2 will get H/H q 6hr, will continue to monitor and transfuse appropriately, will hopefully transfer soon.   8/31: Hgb this morning was 6.1, one unit of RBC ordered, at noon H/H was 7.3 will continue to trend H/H  9/1: Hgb 8.8 this morning required one unit of RBC last night.   9/3: transfused one unit of FFP yesterday. H/H this morning 5.4/16.5, will transfuse with 2 units pRBCs  9/4 H/H 8.4/24/6, will continue to monitor.   9/5: H&H 8.7/26.2 and will continue to monitor  9/6: H/H 8.3/24.3, will continue to monitor.

## 2023-09-06 NOTE — ASSESSMENT & PLAN NOTE
MELD score 22 points. 19.6% with estimated 3 month mortality  CIWA score 0    MRCP reveals cirrhosis and multiple gallstones    8/27: Pt is noted to have scleral icterus, bili 5.5, and continued hematochezia.   Put in for transfer (PFC order) for higher level of care. At 13:25, [facility] has no beds available, per Kaylee Perales RN. At 13:56, [facility] per Dr. Riggs has no available beds & is on diversion.  Call [facility] on 8/28 to see if any beds are available [phone]   8/30: pending transfer from [facility].   8/31: pending transfer.   9/1: pending transfer  9/3; pending transfer, no beds available at this time.   9/5: pending transfer  9/6: pending transfer

## 2023-09-06 NOTE — PROGRESS NOTES
[facility] - [facility] Tele[facility] Hospital Medicine  Progress Note    Patient Name: John Spears  MRN: [id number]  Patient Class: IP- Inpatient   Admission Date: 8/19/2023  Length of Stay: 17 days  Attending Physician: Karen Chamorro MD  Primary Care Provider: Ruben Provider        Subjective:     Principal Problem:Bleeding hemorrhoid        HPI:  Patient is a 47 y/o male with PMH of HTN, HLD, cirrhosis of the liver and hemorrhoids who presents to the ED with complaint of COB and swelling of the abdomen, legs and feet that started about 1 month ago. Patient states that the swelling and SOB has been worsening for the last 2 weeks. Patient reports passing dark blood per rectum with bowel movement for the last 2 weeks. Patient reports having intermittent RUQ when he eats fried foot. Patient reports chills but no fever. Patient denies nausea, vomiting, chest pain or urinary changes. Patient reports drinking about 10 beers of 16 ounces per day for the 3 years and for the last months he stopped drinking beer and started drinking  liquor. He states that 1 fifth of liquor lasts for 3 days. Patient reports withdrawal symptoms in the past. Patient reports that he quit smoking cigarettes 5 years ago.      In the ED showed /67, HR 90, RR 16, SpO2 98% and afebrile. Blood work showed H/H 6.1/19, WBC 5.6, PLT 78, PT 21.2, INR 1.87, PTT 48.2. Sodium 134, potassium 2.8, Alkaline phosphatase 166, Total bilirrubin 6.5, AST/ALST 61/41, p-, troponin 16.7. UA negative for infection or blood. FOBT negative. EKG nsr with HR 89. Patient is receiving PRBC in the ER at this moment and electrolytes are being replenished. Patient will be admitted to the hospital for further management.       Overview/Hospital Course:  No notes on file    Interval History: Patient is sitting comfortably in bed. Patient reports having one bowel movement overnight that did result in some bleeding after the bowel  movement. Will continue to monitor. Hoping to transfer will reach out to [facility] today.     Review of Systems   Constitutional:  Positive for activity change. Negative for fatigue.   Respiratory:  Negative for chest tightness and shortness of breath.    Cardiovascular:  Positive for leg swelling. Negative for chest pain.   Gastrointestinal:  Positive for abdominal distention and anal bleeding.   Skin:  Negative for rash and wound.   Neurological:  Negative for tremors.     Objective:     Vital Signs (Most Recent):  Temp: 97.9 °F (36.6 °C) (09/06/23 0705)  Pulse: 82 (09/06/23 0705)  Resp: 18 (09/06/23 0705)  BP: 114/65 (09/06/23 0705)  SpO2: 98 % (09/06/23 0705) Vital Signs (24h Range):  Temp:  [97.5 °F (36.4 °C)-98.9 °F (37.2 °C)] 97.9 °F (36.6 °C)  Pulse:  [70-82] 82  Resp:  [16-18] 18  SpO2:  [98 %-99 %] 98 %  BP: (101-142)/(54-74) 114/65     Weight: (!) 156.5 kg (345 lb 0.3 oz)  Body mass index is 57.41 kg/m².  No intake or output data in the 24 hours ending 09/06/23 0852      Physical Exam  Vitals and nursing note reviewed.   Constitutional:       Appearance: He is obese. He is ill-appearing.      Comments: Pt is somnolent but aroused easily   HENT:      Head: Normocephalic.      Right Ear: External ear normal.      Left Ear: External ear normal.      Nose: Nose normal.      Mouth/Throat:      Mouth: Mucous membranes are moist.      Pharynx: Oropharynx is clear.   Eyes:      General: Scleral icterus present.   Cardiovascular:      Rate and Rhythm: Normal rate and regular rhythm.      Heart sounds: Normal heart sounds.   Pulmonary:      Breath sounds: Normal breath sounds.   Abdominal:      General: There is distension.   Musculoskeletal:      Right lower leg: Edema present.      Left lower leg: Edema present.   Skin:     General: Skin is warm and dry.      Capillary Refill: Capillary refill takes less than 2 seconds.   Neurological:      Mental Status: He is oriented to person, place, and time.              Significant Labs: All pertinent labs within the past 24 hours have been reviewed.    Significant Imaging: I have reviewed all pertinent imaging results/findings within the past 24 hours.      Assessment/Plan:      * Bleeding hemorrhoid  Likely secondary to liver cirrhosis    Per Dr. Gusman:  Active arterial bleeding on endoscopy in anal canal; too friable for clip. (In OR) Arterial bleeding at the 9 o'clock position in the anal canal; multiple bleeding hemorrhoidal plexus at 6 o'clock position on the posterior canal and at 3 o'clock position    Patient received 2 units of packed red blood cells in the OR and we will give FFP     total units of blood received 8 units.   8/25: No bleeding overnight  8/26/23 no obvious bleeding on rectal dressing    8/27: Rectal bleeding noted this morning. Rectal dressing/pad saturated with blod and clots. 1U PRBC ordered.   8/30: rectal bleeding this morning one unit of RBC ordered.   8/31: rectal bleeding this morning one unit of RBC ordered.   9/1: 1 unit of blood over night   9/3: received one unit of FFP yesterday and vitamin K, H/H this morning 5.4/16.5, will transfuse with 2 units pRBCs  9/4: H/H 8.4/24.6, will continue to monitor.   9/5: H&H stable 8.7/26.2 and will continue to  Monitor  9/6: H/H 8.3/24.3, will continue to monitor.     Hyperammonemia  8/27: Continue with rifaxmin  Started on lactulose on 8/26  Ammonia level 61  Will continue to monitor      Acute lower GI bleeding  Patient received 2 units of packed red blood cells in the OR and we will give FFP  8/25: Hgb 7.8  8/26/23 no obvious rectal bleeding  8/30: 1 unit of blood ordered.   8/31: 1 unit of blood   9/1; 1 unit of blood overnight  9/3: 1 unit of FFP yesterday. H/H this morning 5.4/16.5, will transfuse with 2 units pRBCs  9/4: H/H 8.4/24.6, will continue to monitor.   9/6: H/H 8.3/24.3, will continue to monitor.     Hypertension  Monitor BP    Alcoholic cirrhosis of liver  MELD score 22 points. 19.6%  with estimated 3 month mortality  CIWA score 0    MRCP reveals cirrhosis and multiple gallstones    8/27: Pt is noted to have scleral icterus, bili 5.5, and continued hematochezia.   Put in for transfer (PFC order) for higher level of care. At 13:25, [facility] has no beds available, per Kaylee Perales RN. At 13:56, [facility] per Dr. Riggs has no available beds & is on diversion.  Call [facility] on 8/28 to see if any beds are available [phone]   8/30: pending transfer from [facility].   8/31: pending transfer.   9/1: pending transfer  9/3; pending transfer, no beds available at this time.   9/5: pending transfer  9/6: pending transfer      Anemia  Acute blood loss       Per significant other, had hematochezia 3x this morning. Noted pad saturated with blood and clots.  Hgb 7.2. 1U PRBC noted. Dr. Mahajan informed.   Monitor CBC  8/30: Hgb in morning was 6.1, one unit of Rbc ordered on noon check it was 7.2 will get H/H q 6hr, will continue to monitor and transfuse appropriately, will hopefully transfer soon.   8/31: Hgb this morning was 6.1, one unit of RBC ordered, at noon H/H was 7.3 will continue to trend H/H  9/1: Hgb 8.8 this morning required one unit of RBC last night.   9/3: transfused one unit of FFP yesterday. H/H this morning 5.4/16.5, will transfuse with 2 units pRBCs  9/4 H/H 8.4/24/6, will continue to monitor.   9/5: H&H 8.7/26.2 and will continue to monitor  9/6: H/H 8.3/24.3, will continue to monitor.     Hypoalbuminemia  Likely from malturition + or - synthetic function of the liver  C/w Spironolactone and lasix      VTE Risk Mitigation (From admission, onward)         Ordered     Reason for No Pharmacological VTE Prophylaxis  Once        Question:  Reasons:  Answer:  Active Bleeding    08/20/23 0043     IP VTE HIGH RISK PATIENT  Once         08/20/23 0043     Place sequential compression device  Until discontinued         08/20/23 0043                Discharge Planning   LUCRECIA:      Code Status: Full Code   Is the patient  medically ready for discharge?:     Reason for patient still in hospital (select all that apply): Treatment  Discharge Plan A: Home with family                  Zach Morales DO  Department of Hospital Medicine   [facility] - [facility] Tele[facility]

## 2023-09-06 NOTE — ASSESSMENT & PLAN NOTE
Patient received 2 units of packed red blood cells in the OR and we will give FFP  8/25: Hgb 7.8  8/26/23 no obvious rectal bleeding  8/30: 1 unit of blood ordered.   8/31: 1 unit of blood   9/1; 1 unit of blood overnight  9/3: 1 unit of FFP yesterday. H/H this morning 5.4/16.5, will transfuse with 2 units pRBCs  9/4: H/H 8.4/24.6, will continue to monitor.   9/6: H/H 8.3/24.3, will continue to monitor.

## 2023-09-07 LAB
ALBUMIN SERPL BCP-MCNC: 1.5 G/DL (ref 3.5–5)
ALBUMIN/GLOB SERPL: 0.3 {RATIO}
ALP SERPL-CCNC: 221 U/L (ref 45–115)
ALT SERPL W P-5'-P-CCNC: 65 U/L (ref 16–61)
ANION GAP SERPL CALCULATED.3IONS-SCNC: 5 MMOL/L (ref 7–16)
AST SERPL W P-5'-P-CCNC: 88 U/L (ref 15–37)
BASOPHILS # BLD AUTO: 0.08 K/UL (ref 0–0.2)
BASOPHILS NFR BLD AUTO: 0.8 % (ref 0–1)
BILIRUB SERPL-MCNC: 3.7 MG/DL (ref ?–1.2)
BUN SERPL-MCNC: 20 MG/DL (ref 7–18)
BUN/CREAT SERPL: 14 (ref 6–20)
CALCIUM SERPL-MCNC: 7.6 MG/DL (ref 8.5–10.1)
CHLORIDE SERPL-SCNC: 100 MMOL/L (ref 98–107)
CO2 SERPL-SCNC: 30 MMOL/L (ref 21–32)
CREAT SERPL-MCNC: 1.41 MG/DL (ref 0.7–1.3)
DIFFERENTIAL METHOD BLD: ABNORMAL
EGFR (NO RACE VARIABLE) (RUSH/TITUS): 61 ML/MIN/1.73M2
EOSINOPHIL # BLD AUTO: 0.76 K/UL (ref 0–0.5)
EOSINOPHIL NFR BLD AUTO: 7.6 % (ref 1–4)
ERYTHROCYTE [DISTWIDTH] IN BLOOD BY AUTOMATED COUNT: 18.4 % (ref 11.5–14.5)
GLOBULIN SER-MCNC: 4.5 G/DL (ref 2–4)
GLUCOSE SERPL-MCNC: 106 MG/DL (ref 74–106)
HCT VFR BLD AUTO: 23.9 % (ref 40–54)
HCT VFR BLD AUTO: 23.9 % (ref 40–54)
HCT VFR BLD AUTO: 26.7 % (ref 40–54)
HCT VFR BLD AUTO: 27.8 % (ref 40–54)
HGB BLD-MCNC: 8 G/DL (ref 13.5–18)
HGB BLD-MCNC: 8 G/DL (ref 13.5–18)
HGB BLD-MCNC: 8.8 G/DL (ref 13.5–18)
HGB BLD-MCNC: 9 G/DL (ref 13.5–18)
IMM GRANULOCYTES # BLD AUTO: 0.05 K/UL (ref 0–0.04)
IMM GRANULOCYTES NFR BLD: 0.5 % (ref 0–0.4)
INR BLD: 2.04
LYMPHOCYTES # BLD AUTO: 1.18 K/UL (ref 1–4.8)
LYMPHOCYTES NFR BLD AUTO: 11.9 % (ref 27–41)
MCH RBC QN AUTO: 29.9 PG (ref 27–31)
MCHC RBC AUTO-ENTMCNC: 33.5 G/DL (ref 32–36)
MCV RBC AUTO: 89.2 FL (ref 80–96)
MONOCYTES # BLD AUTO: 1.57 K/UL (ref 0–0.8)
MONOCYTES NFR BLD AUTO: 15.8 % (ref 2–6)
MPC BLD CALC-MCNC: 11.3 FL (ref 9.4–12.4)
NEUTROPHILS # BLD AUTO: 6.3 K/UL (ref 1.8–7.7)
NEUTROPHILS NFR BLD AUTO: 63.4 % (ref 53–65)
NRBC # BLD AUTO: 0 X10E3/UL
NRBC, AUTO (.00): 0 %
PLATELET # BLD AUTO: 93 K/UL (ref 150–400)
POTASSIUM SERPL-SCNC: 4.3 MMOL/L (ref 3.5–5.1)
PROT SERPL-MCNC: 6 G/DL (ref 6.4–8.2)
PROTHROMBIN TIME: 22.7 SECONDS (ref 11.7–14.7)
RBC # BLD AUTO: 2.68 M/UL (ref 4.6–6.2)
SODIUM SERPL-SCNC: 131 MMOL/L (ref 136–145)
WBC # BLD AUTO: 9.94 K/UL (ref 4.5–11)

## 2023-09-07 PROCEDURE — 25000003 PHARM REV CODE 250: Performed by: STUDENT IN AN ORGANIZED HEALTH CARE EDUCATION/TRAINING PROGRAM

## 2023-09-07 PROCEDURE — 11000001 HC ACUTE MED/SURG PRIVATE ROOM

## 2023-09-07 PROCEDURE — 25000003 PHARM REV CODE 250

## 2023-09-07 PROCEDURE — 97116 GAIT TRAINING THERAPY: CPT | Mod: CQ

## 2023-09-07 PROCEDURE — 85025 COMPLETE CBC W/AUTO DIFF WBC: CPT | Performed by: STUDENT IN AN ORGANIZED HEALTH CARE EDUCATION/TRAINING PROGRAM

## 2023-09-07 PROCEDURE — 80053 COMPREHEN METABOLIC PANEL: CPT | Performed by: STUDENT IN AN ORGANIZED HEALTH CARE EDUCATION/TRAINING PROGRAM

## 2023-09-07 PROCEDURE — 97110 THERAPEUTIC EXERCISES: CPT | Mod: CQ

## 2023-09-07 PROCEDURE — 85014 HEMATOCRIT: CPT

## 2023-09-07 PROCEDURE — 85610 PROTHROMBIN TIME: CPT | Performed by: STUDENT IN AN ORGANIZED HEALTH CARE EDUCATION/TRAINING PROGRAM

## 2023-09-07 RX ADMIN — LACTULOSE 10 G: 20 SOLUTION ORAL at 08:09

## 2023-09-07 RX ADMIN — SPIRONOLACTONE 50 MG: 25 TABLET ORAL at 08:09

## 2023-09-07 RX ADMIN — RIFAXIMIN 550 MG: 550 TABLET ORAL at 08:09

## 2023-09-07 RX ADMIN — FOLIC ACID 1 MG: 1 TABLET ORAL at 08:09

## 2023-09-07 RX ADMIN — FUROSEMIDE 40 MG: 40 TABLET ORAL at 06:09

## 2023-09-07 RX ADMIN — PANTOPRAZOLE SODIUM 40 MG: 40 TABLET, DELAYED RELEASE ORAL at 08:09

## 2023-09-07 RX ADMIN — OXYCODONE AND ACETAMINOPHEN 1 TABLET: 10; 325 TABLET ORAL at 08:09

## 2023-09-07 RX ADMIN — FUROSEMIDE 40 MG: 40 TABLET ORAL at 08:09

## 2023-09-07 RX ADMIN — THIAMINE HCL TAB 100 MG 100 MG: 100 TAB at 08:09

## 2023-09-07 RX ADMIN — RIFAXIMIN 550 MG: 550 TABLET ORAL at 09:09

## 2023-09-07 RX ADMIN — PROPRANOLOL HYDROCHLORIDE 10 MG: 10 TABLET ORAL at 08:09

## 2023-09-07 RX ADMIN — OXYCODONE AND ACETAMINOPHEN 1 TABLET: 10; 325 TABLET ORAL at 02:09

## 2023-09-07 RX ADMIN — OXYCODONE AND ACETAMINOPHEN 1 TABLET: 10; 325 TABLET ORAL at 09:09

## 2023-09-07 NOTE — PLAN OF CARE
Chart reviewed. Per [facility] notes, patient has been accepted to [facility]. However, awaiting bed to become available. Transfer packet placed in patient's cubby. SS following.

## 2023-09-07 NOTE — PROGRESS NOTES
[facility] - [facility] Hospital Medicine  Progress Note    Patient Name: John Spears  MRN: [id number]  Patient Class: IP- Inpatient   Admission Date: 8/19/2023  Length of Stay: 18 days  Attending Physician: Karen Chamorro MD  Primary Care Provider: Ruben Provider        Subjective:     Principal Problem:Bleeding hemorrhoid        HPI:  Patient is a 49 y/o male with PMH of HTN, HLD, cirrhosis of the liver and hemorrhoids who presents to the ED with complaint of COB and swelling of the abdomen, legs and feet that started about 1 month ago. Patient states that the swelling and SOB has been worsening for the last 2 weeks. Patient reports passing dark blood per rectum with bowel movement for the last 2 weeks. Patient reports having intermittent RUQ when he eats fried foot. Patient reports chills but no fever. Patient denies nausea, vomiting, chest pain or urinary changes. Patient reports drinking about 10 beers of 16 ounces per day for the 3 years and for the last months he stopped drinking beer and started drinking  liquor. He states that 1 fifth of liquor lasts for 3 days. Patient reports withdrawal symptoms in the past. Patient reports that he quit smoking cigarettes 5 years ago.      In the ED showed /67, HR 90, RR 16, SpO2 98% and afebrile. Blood work showed H/H 6.1/19, WBC 5.6, PLT 78, PT 21.2, INR 1.87, PTT 48.2. Sodium 134, potassium 2.8, Alkaline phosphatase 166, Total bilirrubin 6.5, AST/ALST 61/41, p-, troponin 16.7. UA negative for infection or blood. FOBT negative. EKG nsr with HR 89. Patient is receiving PRBC in the ER at this moment and electrolytes are being replenished. Patient will be admitted to the hospital for further management.       Overview/Hospital Course:  No notes on file    Interval History: Patient is sitting comfortably in bed. He has no acute complaints overnight. Will continue to trend his H/H.     Review of Systems    Constitutional:  Positive for activity change. Negative for fatigue.   Respiratory:  Negative for chest tightness and shortness of breath.    Cardiovascular:  Positive for leg swelling. Negative for chest pain.   Gastrointestinal:  Positive for abdominal distention and anal bleeding.   Skin:  Negative for rash and wound.   Neurological:  Negative for tremors.     Objective:     Vital Signs (Most Recent):  Temp: 98.4 °F (36.9 °C) (09/07/23 1100)  Pulse: 65 (09/07/23 1100)  Resp: 20 (09/07/23 1100)  BP: (!) 146/86 (09/07/23 1100)  SpO2: 100 % (09/07/23 1100) Vital Signs (24h Range):  Temp:  [98.2 °F (36.8 °C)-98.9 °F (37.2 °C)] 98.4 °F (36.9 °C)  Pulse:  [65-75] 65  Resp:  [18-20] 20  SpO2:  [97 %-100 %] 100 %  BP: (109-146)/(62-86) 146/86     Weight: (!) 156.5 kg (345 lb 0.3 oz)  Body mass index is 57.41 kg/m².  No intake or output data in the 24 hours ending 09/07/23 1244      Physical Exam  Vitals and nursing note reviewed.   Constitutional:       Appearance: He is obese. He is ill-appearing.   HENT:      Head: Normocephalic.      Right Ear: External ear normal.      Left Ear: External ear normal.      Nose: Nose normal.      Mouth/Throat:      Mouth: Mucous membranes are moist.      Pharynx: Oropharynx is clear.   Eyes:      General: Scleral icterus present.   Cardiovascular:      Rate and Rhythm: Normal rate and regular rhythm.      Heart sounds: Normal heart sounds.   Pulmonary:      Breath sounds: Normal breath sounds.   Abdominal:      General: There is distension.   Musculoskeletal:      Right lower leg: Edema present.      Left lower leg: Edema present.   Skin:     General: Skin is warm and dry.      Capillary Refill: Capillary refill takes less than 2 seconds.   Neurological:      Mental Status: He is oriented to person, place, and time.             Significant Labs: All pertinent labs within the past 24 hours have been reviewed.    Significant Imaging: I have reviewed all pertinent imaging  results/findings within the past 24 hours.      Assessment/Plan:      * Bleeding hemorrhoid  Likely secondary to liver cirrhosis    Per Dr. Gusman:  Active arterial bleeding on endoscopy in anal canal; too friable for clip. (In OR) Arterial bleeding at the 9 o'clock position in the anal canal; multiple bleeding hemorrhoidal plexus at 6 o'clock position on the posterior canal and at 3 o'clock position    Patient received 2 units of packed red blood cells in the OR and we will give FFP     total units of blood received 8 units.   8/25: No bleeding overnight  8/26/23 no obvious bleeding on rectal dressing    8/27: Rectal bleeding noted this morning. Rectal dressing/pad saturated with blod and clots. 1U PRBC ordered.   8/30: rectal bleeding this morning one unit of RBC ordered.   8/31: rectal bleeding this morning one unit of RBC ordered.   9/1: 1 unit of blood over night   9/3: received one unit of FFP yesterday and vitamin K, H/H this morning 5.4/16.5, will transfuse with 2 units pRBCs  9/4: H/H 8.4/24.6, will continue to monitor.   9/5: H&H stable 8.7/26.2 and will continue to  Monitor  9/6: H/H 8.3/24.3, will continue to monitor.   9/7: H/H 8.8/26.7, will continue to monitor.     Hyperammonemia  8/27: Continue with rifaxmin  Started on lactulose on 8/26  Ammonia level 61  Will continue to monitor      Acute lower GI bleeding  Patient received 2 units of packed red blood cells in the OR and we will give FFP  8/25: Hgb 7.8  8/26/23 no obvious rectal bleeding  8/30: 1 unit of blood ordered.   8/31: 1 unit of blood   9/1; 1 unit of blood overnight  9/3: 1 unit of FFP yesterday. H/H this morning 5.4/16.5, will transfuse with 2 units pRBCs  9/4: H/H 8.4/24.6, will continue to monitor.   9/6: H/H 8.3/24.3, will continue to monitor.   9/7: H/H 8.8/26.7, will continue to monitor.     Hypertension  Monitor BP    Alcoholic cirrhosis of liver  MELD score 22 points. 19.6% with estimated 3 month mortality  CIWA score 0    MRCP  reveals cirrhosis and multiple gallstones    8/27: Pt is noted to have scleral icterus, bili 5.5, and continued hematochezia.   Put in for transfer (PFC order) for higher level of care. Patient accepted at [facility] awaiting on bed.   8/30: pending transfer from [facility].   8/31: pending transfer.   9/1: pending transfer  9/3; pending transfer, no beds available at this time.   9/5: pending transfer  9/6: pending transfer  9/7: pending transfer.       Anemia  Acute blood loss   Per significant other, had hematochezia 3x this morning. Noted pad saturated with blood and clots.  Hgb 7.2. 1U PRBC noted. Dr. Mahajan informed.   Monitor CBC  8/30: Hgb in morning was 6.1, one unit of Rbc ordered on noon check it was 7.2 will get H/H q 6hr, will continue to monitor and transfuse appropriately, will hopefully transfer soon.   8/31: Hgb this morning was 6.1, one unit of RBC ordered, at noon H/H was 7.3 will continue to trend H/H  9/1: Hgb 8.8 this morning required one unit of RBC last night.   9/3: transfused one unit of FFP yesterday. H/H this morning 5.4/16.5, will transfuse with 2 units pRBCs  9/4 H/H 8.4/24/6, will continue to monitor.   9/5: H&H 8.7/26.2 and will continue to monitor  9/6: H/H 8.3/24.3, will continue to monitor.   9/8: H/H 8.8/26.7, will continue to monitor.     Hypoalbuminemia  Likely from malturition + or - synthetic function of the liver  C/w Spironolactone and lasix      VTE Risk Mitigation (From admission, onward)         Ordered     Reason for No Pharmacological VTE Prophylaxis  Once        Question:  Reasons:  Answer:  Active Bleeding    08/20/23 0043     IP VTE HIGH RISK PATIENT  Once         08/20/23 0043     Place sequential compression device  Until discontinued         08/20/23 0043                Discharge Planning   LUCRECIA:      Code Status: Full Code   Is the patient medically ready for discharge?:     Reason for patient still in hospital (select all that apply): Treatment  Discharge Plan A: Home with  family                  Zach Morales DO  Department of Hospital Medicine   [facility] - [address] [place] [address]

## 2023-09-07 NOTE — PT/OT/SLP PROGRESS
Physical Therapy Treatment    Patient Name:  John Spears   MRN:  [id number]    Recommendations:     Discharge Recommendations: nursing facility, skilled  Discharge Equipment Recommendations: to be determined by next level of care  Barriers to discharge:  ongoing medical treatment    Assessment:     John Spears is a 48 y.o. male admitted with a medical diagnosis of Bleeding hemorrhoid.  He presents with the following impairments/functional limitations: impaired self care skills, impaired functional mobility, gait instability, edema.      Pt participated well with PT, however, required near constant verbal and tactile cueing to remain awake during exercise    PT POC discussed with Kandace Hodges DPT    Rehab Prognosis: Good and Fair; patient would benefit from acute skilled PT services to address these deficits and reach maximum level of function.    Recent Surgery: Procedure(s) (LRB):  EXAM UNDER ANESTHESIA, DIGITAL, RECTUM (N/A)  RETURN TO OPERATING ROOM, FOR MINOR POSTOPERATIVE HEMORRHAGE CONTROL  HEMORRHOIDECTOMY (N/A) 14 Days Post-Op    Plan:     During this hospitalization, patient to be seen 5 x/week to address the identified rehab impairments via gait training, therapeutic activities, therapeutic exercises and progress toward the following goals:    Plan of Care Expires:  10/05/23    Subjective     Chief Complaint:   Patient/Family Comments/goals: pt agreeable  Pain/Comfort:         Objective:     Communicated with Ron Carrasco RN prior to session.  Patient found HOB elevated with telemetry, PureWick upon PT entry to room.     General Precautions: Standard, fall  Orthopedic Precautions: N/A  Braces: N/A  Respiratory Status: Room air     Functional Mobility:  Bed Mobility:     Rolling Left:  modified independence  Rolling Right: modified independence  Supine to Sit: modified independence  Transfers:     Sit to Stand:  supervision with rolling walker  Gait: 104' with RW and standby assist; slow  jeannie, short step length, wide ZAKIYA, mild path deviation      AM-PAC 6 CLICK MOBILITY  Turning over in bed (including adjusting bedclothes, sheets and blankets)?: 4  Sitting down on and standing up from a chair with arms (e.g., wheelchair, bedside commode, etc.): 4  Moving from lying on back to sitting on the side of the bed?: 4  Moving to and from a bed to a chair (including a wheelchair)?: 4  Need to walk in hospital room?: 4  Climbing 3-5 steps with a railing?: 1  Basic Mobility Total Score: 21       Treatment & Education:  Pt performed 2 x 15 reps (B) LE exercises: ap, quad set, glut set, straight leg raise, hip ab/adduction, long arc quad, heel slide with assist and rest as needed      Patient left up in chair with all lines intact, call button in reach, and family member present..    GOALS:   Multidisciplinary Problems       Physical Therapy Goals          Problem: Physical Therapy    Goal Priority Disciplines Outcome Goal Variances Interventions   Physical Therapy Goal     PT, PT/OT Ongoing, Progressing     Description: Short Term Goals to be met by: 23    Patient will increase functional independence with mobility by performin. Supine to sit with Stand by assist  2. Sit to stand transfer with Stand by assist using Rolling walker  3. Bed to chair transfer with Stand by assist using standard walker  4. Gait  x 100 feet with Stand by assist using Rolling walker  5. Lower extremity exercise program x30 reps per handout, with assistance as needed    Long Term Goals to be met by: 10/5/23    Pt will regain full independent functional mobility with Rolling walker to return to home situation and prior activities of daily living.                        Time Tracking:     PT Received On: 23  PT Start Time: 926     PT Stop Time: 956  PT Total Time (min): 30 min     Billable Minutes: Gait Training 10 and Therapeutic Exercise 15    Treatment Type: Treatment  PT/PTA: PTA     Number of PTA visits since  last PT visit: 1 09/07/2023

## 2023-09-07 NOTE — ASSESSMENT & PLAN NOTE
Likely secondary to liver cirrhosis    Per Dr. Gusman:  Active arterial bleeding on endoscopy in anal canal; too friable for clip. (In OR) Arterial bleeding at the 9 o'clock position in the anal canal; multiple bleeding hemorrhoidal plexus at 6 o'clock position on the posterior canal and at 3 o'clock position    Patient received 2 units of packed red blood cells in the OR and we will give FFP     total units of blood received 8 units.   8/25: No bleeding overnight  8/26/23 no obvious bleeding on rectal dressing    8/27: Rectal bleeding noted this morning. Rectal dressing/pad saturated with blod and clots. 1U PRBC ordered.   8/30: rectal bleeding this morning one unit of RBC ordered.   8/31: rectal bleeding this morning one unit of RBC ordered.   9/1: 1 unit of blood over night   9/3: received one unit of FFP yesterday and vitamin K, H/H this morning 5.4/16.5, will transfuse with 2 units pRBCs  9/4: H/H 8.4/24.6, will continue to monitor.   9/5: H&H stable 8.7/26.2 and will continue to  Monitor  9/6: H/H 8.3/24.3, will continue to monitor.   9/7: H/H 8.8/26.7, will continue to monitor.

## 2023-09-07 NOTE — ASSESSMENT & PLAN NOTE
MELD score 22 points. 19.6% with estimated 3 month mortality  CIWA score 0    MRCP reveals cirrhosis and multiple gallstones    8/27: Pt is noted to have scleral icterus, bili 5.5, and continued hematochezia.   Put in for transfer ([facility] order) for higher level of care. Patient accepted at [facility] awaiting on bed.   8/30: pending transfer from [facility].   8/31: pending transfer.   9/1: pending transfer  9/3; pending transfer, no beds available at this time.   9/5: pending transfer  9/6: pending transfer  9/7: pending transfer.

## 2023-09-07 NOTE — SUBJECTIVE & OBJECTIVE
Interval History: Patient is sitting comfortably in bed. He has no acute complaints overnight. Will continue to trend his H/H.     Review of Systems   Constitutional:  Positive for activity change. Negative for fatigue.   Respiratory:  Negative for chest tightness and shortness of breath.    Cardiovascular:  Positive for leg swelling. Negative for chest pain.   Gastrointestinal:  Positive for abdominal distention and anal bleeding.   Skin:  Negative for rash and wound.   Neurological:  Negative for tremors.     Objective:     Vital Signs (Most Recent):  Temp: 98.4 °F (36.9 °C) (09/07/23 1100)  Pulse: 65 (09/07/23 1100)  Resp: 20 (09/07/23 1100)  BP: (!) 146/86 (09/07/23 1100)  SpO2: 100 % (09/07/23 1100) Vital Signs (24h Range):  Temp:  [98.2 °F (36.8 °C)-98.9 °F (37.2 °C)] 98.4 °F (36.9 °C)  Pulse:  [65-75] 65  Resp:  [18-20] 20  SpO2:  [97 %-100 %] 100 %  BP: (109-146)/(62-86) 146/86     Weight: (!) 156.5 kg (345 lb 0.3 oz)  Body mass index is 57.41 kg/m².  No intake or output data in the 24 hours ending 09/07/23 1244      Physical Exam  Vitals and nursing note reviewed.   Constitutional:       Appearance: He is obese. He is ill-appearing.   HENT:      Head: Normocephalic.      Right Ear: External ear normal.      Left Ear: External ear normal.      Nose: Nose normal.      Mouth/Throat:      Mouth: Mucous membranes are moist.      Pharynx: Oropharynx is clear.   Eyes:      General: Scleral icterus present.   Cardiovascular:      Rate and Rhythm: Normal rate and regular rhythm.      Heart sounds: Normal heart sounds.   Pulmonary:      Breath sounds: Normal breath sounds.   Abdominal:      General: There is distension.   Musculoskeletal:      Right lower leg: Edema present.      Left lower leg: Edema present.   Skin:     General: Skin is warm and dry.      Capillary Refill: Capillary refill takes less than 2 seconds.   Neurological:      Mental Status: He is oriented to person, place, and time.              Significant Labs: All pertinent labs within the past 24 hours have been reviewed.    Significant Imaging: I have reviewed all pertinent imaging results/findings within the past 24 hours.

## 2023-09-07 NOTE — ASSESSMENT & PLAN NOTE
Patient received 2 units of packed red blood cells in the OR and we will give FFP  8/25: Hgb 7.8  8/26/23 no obvious rectal bleeding  8/30: 1 unit of blood ordered.   8/31: 1 unit of blood   9/1; 1 unit of blood overnight  9/3: 1 unit of FFP yesterday. H/H this morning 5.4/16.5, will transfuse with 2 units pRBCs  9/4: H/H 8.4/24.6, will continue to monitor.   9/6: H/H 8.3/24.3, will continue to monitor.   9/7: H/H 8.8/26.7, will continue to monitor.

## 2023-09-07 NOTE — PLAN OF CARE
Problem: Adult Inpatient Plan of Care  Goal: Plan of Care Review  Outcome: Ongoing, Progressing  Goal: Absence of Hospital-Acquired Illness or Injury  Outcome: Ongoing, Progressing     Problem: Bariatric Environmental Safety  Goal: Safety Maintained with Care  Outcome: Ongoing, Progressing     Problem: Skin Injury Risk Increased  Goal: Skin Health and Integrity  Outcome: Ongoing, Progressing

## 2023-09-07 NOTE — ASSESSMENT & PLAN NOTE
Acute blood loss   Per significant other, had hematochezia 3x this morning. Noted pad saturated with blood and clots.  Hgb 7.2. 1U PRBC noted. Dr. Mahajan informed.   Monitor CBC  8/30: Hgb in morning was 6.1, one unit of Rbc ordered on noon check it was 7.2 will get H/H q 6hr, will continue to monitor and transfuse appropriately, will hopefully transfer soon.   8/31: Hgb this morning was 6.1, one unit of RBC ordered, at noon H/H was 7.3 will continue to trend H/H  9/1: Hgb 8.8 this morning required one unit of RBC last night.   9/3: transfused one unit of FFP yesterday. H/H this morning 5.4/16.5, will transfuse with 2 units pRBCs  9/4 H/H 8.4/24/6, will continue to monitor.   9/5: H&H 8.7/26.2 and will continue to monitor  9/6: H/H 8.3/24.3, will continue to monitor.   9/8: H/H 8.8/26.7, will continue to monitor.

## 2023-09-08 VITALS
WEIGHT: 315 LBS | HEART RATE: 79 BPM | OXYGEN SATURATION: 98 % | BODY MASS INDEX: 52.48 KG/M2 | TEMPERATURE: 99 F | DIASTOLIC BLOOD PRESSURE: 72 MMHG | RESPIRATION RATE: 18 BRPM | HEIGHT: 65 IN | SYSTOLIC BLOOD PRESSURE: 145 MMHG

## 2023-09-08 PROBLEM — E72.20 HYPERAMMONEMIA: Status: RESOLVED | Noted: 2023-08-25 | Resolved: 2023-09-08

## 2023-09-08 PROBLEM — K92.2 LOWER GI BLEED REQUIRING MORE THAN 4 UNITS OF BLOOD IN 24 HOURS, ICU, OR SURGERY: Status: ACTIVE | Noted: 2023-08-20

## 2023-09-08 PROBLEM — K64.9 BLEEDING HEMORRHOID: Status: ACTIVE | Noted: 2023-09-08

## 2023-09-08 LAB
ALBUMIN SERPL BCP-MCNC: 1.7 G/DL (ref 3.5–5)
ALBUMIN/GLOB SERPL: 0.4 {RATIO}
ALP SERPL-CCNC: 240 U/L (ref 45–115)
ALT SERPL W P-5'-P-CCNC: 66 U/L (ref 16–61)
ANION GAP SERPL CALCULATED.3IONS-SCNC: 8 MMOL/L (ref 7–16)
AST SERPL W P-5'-P-CCNC: 88 U/L (ref 15–37)
BASOPHILS # BLD AUTO: 0.09 K/UL (ref 0–0.2)
BASOPHILS NFR BLD AUTO: 0.9 % (ref 0–1)
BILIRUB SERPL-MCNC: 3.6 MG/DL (ref ?–1.2)
BUN SERPL-MCNC: 20 MG/DL (ref 7–18)
BUN/CREAT SERPL: 14 (ref 6–20)
CALCIUM SERPL-MCNC: 7.9 MG/DL (ref 8.5–10.1)
CHLORIDE SERPL-SCNC: 98 MMOL/L (ref 98–107)
CO2 SERPL-SCNC: 30 MMOL/L (ref 21–32)
CREAT SERPL-MCNC: 1.39 MG/DL (ref 0.7–1.3)
DIFFERENTIAL METHOD BLD: ABNORMAL
EGFR (NO RACE VARIABLE) (RUSH/TITUS): 62 ML/MIN/1.73M2
EOSINOPHIL # BLD AUTO: 0.85 K/UL (ref 0–0.5)
EOSINOPHIL NFR BLD AUTO: 9 % (ref 1–4)
ERYTHROCYTE [DISTWIDTH] IN BLOOD BY AUTOMATED COUNT: 18.5 % (ref 11.5–14.5)
GLOBULIN SER-MCNC: 4.4 G/DL (ref 2–4)
GLUCOSE SERPL-MCNC: 83 MG/DL (ref 74–106)
HCT VFR BLD AUTO: 24.7 % (ref 40–54)
HCT VFR BLD AUTO: 24.8 % (ref 40–54)
HCT VFR BLD AUTO: 24.8 % (ref 40–54)
HCT VFR BLD AUTO: 25.6 % (ref 40–54)
HGB BLD-MCNC: 8.2 G/DL (ref 13.5–18)
HGB BLD-MCNC: 8.3 G/DL (ref 13.5–18)
HGB BLD-MCNC: 8.3 G/DL (ref 13.5–18)
HGB BLD-MCNC: 8.4 G/DL (ref 13.5–18)
IMM GRANULOCYTES # BLD AUTO: 0.05 K/UL (ref 0–0.04)
IMM GRANULOCYTES NFR BLD: 0.5 % (ref 0–0.4)
INR BLD: 1.81
LYMPHOCYTES # BLD AUTO: 1.31 K/UL (ref 1–4.8)
LYMPHOCYTES NFR BLD AUTO: 13.8 % (ref 27–41)
MCH RBC QN AUTO: 30.2 PG (ref 27–31)
MCHC RBC AUTO-ENTMCNC: 33.5 G/DL (ref 32–36)
MCV RBC AUTO: 90.2 FL (ref 80–96)
MONOCYTES # BLD AUTO: 1.8 K/UL (ref 0–0.8)
MONOCYTES NFR BLD AUTO: 19 % (ref 2–6)
MPC BLD CALC-MCNC: 10.9 FL (ref 9.4–12.4)
NEUTROPHILS # BLD AUTO: 5.38 K/UL (ref 1.8–7.7)
NEUTROPHILS NFR BLD AUTO: 56.8 % (ref 53–65)
NRBC # BLD AUTO: 0 X10E3/UL
NRBC, AUTO (.00): 0 %
PLATELET # BLD AUTO: 88 K/UL (ref 150–400)
POTASSIUM SERPL-SCNC: 4.2 MMOL/L (ref 3.5–5.1)
PROT SERPL-MCNC: 6.1 G/DL (ref 6.4–8.2)
PROTHROMBIN TIME: 20.7 SECONDS (ref 11.7–14.7)
RBC # BLD AUTO: 2.75 M/UL (ref 4.6–6.2)
SODIUM SERPL-SCNC: 132 MMOL/L (ref 136–145)
WBC # BLD AUTO: 9.48 K/UL (ref 4.5–11)

## 2023-09-08 PROCEDURE — 25000003 PHARM REV CODE 250: Performed by: STUDENT IN AN ORGANIZED HEALTH CARE EDUCATION/TRAINING PROGRAM

## 2023-09-08 PROCEDURE — 25000003 PHARM REV CODE 250

## 2023-09-08 PROCEDURE — 97110 THERAPEUTIC EXERCISES: CPT | Mod: CQ

## 2023-09-08 PROCEDURE — 85025 COMPLETE CBC W/AUTO DIFF WBC: CPT | Performed by: STUDENT IN AN ORGANIZED HEALTH CARE EDUCATION/TRAINING PROGRAM

## 2023-09-08 PROCEDURE — 99238 PR HOSPITAL DISCHARGE DAY,<30 MIN: ICD-10-PCS | Mod: ,,, | Performed by: HOSPITALIST

## 2023-09-08 PROCEDURE — 97116 GAIT TRAINING THERAPY: CPT | Mod: CQ

## 2023-09-08 PROCEDURE — 85610 PROTHROMBIN TIME: CPT | Performed by: STUDENT IN AN ORGANIZED HEALTH CARE EDUCATION/TRAINING PROGRAM

## 2023-09-08 PROCEDURE — 11000001 HC ACUTE MED/SURG PRIVATE ROOM

## 2023-09-08 PROCEDURE — 80053 COMPREHEN METABOLIC PANEL: CPT | Performed by: STUDENT IN AN ORGANIZED HEALTH CARE EDUCATION/TRAINING PROGRAM

## 2023-09-08 PROCEDURE — 85014 HEMATOCRIT: CPT

## 2023-09-08 PROCEDURE — 99238 HOSP IP/OBS DSCHRG MGMT 30/<: CPT | Mod: ,,, | Performed by: HOSPITALIST

## 2023-09-08 RX ADMIN — PROPRANOLOL HYDROCHLORIDE 10 MG: 10 TABLET ORAL at 08:09

## 2023-09-08 RX ADMIN — OXYCODONE AND ACETAMINOPHEN 1 TABLET: 10; 325 TABLET ORAL at 08:09

## 2023-09-08 RX ADMIN — RIFAXIMIN 550 MG: 550 TABLET ORAL at 08:09

## 2023-09-08 RX ADMIN — LACTULOSE 10 G: 20 SOLUTION ORAL at 08:09

## 2023-09-08 RX ADMIN — SPIRONOLACTONE 50 MG: 25 TABLET ORAL at 08:09

## 2023-09-08 RX ADMIN — OXYCODONE AND ACETAMINOPHEN 1 TABLET: 10; 325 TABLET ORAL at 12:09

## 2023-09-08 RX ADMIN — THIAMINE HCL TAB 100 MG 100 MG: 100 TAB at 08:09

## 2023-09-08 RX ADMIN — FOLIC ACID 1 MG: 1 TABLET ORAL at 08:09

## 2023-09-08 RX ADMIN — PANTOPRAZOLE SODIUM 40 MG: 40 TABLET, DELAYED RELEASE ORAL at 08:09

## 2023-09-08 RX ADMIN — FUROSEMIDE 40 MG: 40 TABLET ORAL at 08:09

## 2023-09-08 NOTE — PT/OT/SLP PROGRESS
Physical Therapy Treatment    Patient Name:  John Spears   MRN:  [id number]    Recommendations:     Discharge Recommendations: nursing facility, skilled  Discharge Equipment Recommendations: to be determined by next level of care  Barriers to discharge:  ongoing medical treatment    Assessment:     John Spears is a 48 y.o. male admitted with a medical diagnosis of Lower GI bleed requiring more than 4 units of blood in 24 hours, ICU, or surgery.  He presents with the following impairments/functional limitations: impaired self care skills, impaired functional mobility, gait instability, edema.    Pt more interactive and able to complete all activities without sig difficulties    Rehab Prognosis: Good; patient would benefit from acute skilled PT services to address these deficits and reach maximum level of function.    Recent Surgery: Procedure(s) (LRB):  EXAM UNDER ANESTHESIA, DIGITAL, RECTUM (N/A)  RETURN TO OPERATING ROOM, FOR MINOR POSTOPERATIVE HEMORRHAGE CONTROL  HEMORRHOIDECTOMY (N/A) 15 Days Post-Op    Plan:     During this hospitalization, patient to be seen 5 x/week to address the identified rehab impairments via gait training, therapeutic activities, therapeutic exercises and progress toward the following goals:    Plan of Care Expires:  10/05/23    Subjective     Chief Complaint:   Patient/Family Comments/goals: pt agreeable  Pain/Comfort:         Objective:     Communicated with Ron Carrasco RN prior to session.  Patient found HOB elevated with telemetry, PureWick upon PT entry to room.     General Precautions: Standard, fall  Orthopedic Precautions: N/A  Braces: N/A  Respiratory Status: Room air     Functional Mobility:  Bed Mobility:     Supine to Sit: modified independence  Transfers:     Sit to Stand:  modified independence with rolling walker  Gait: 104' with RW and standby assist; slow jeannie, short step length, wide ZAKIYA      AM-PAC 6 CLICK MOBILITY  Turning over in bed (including  adjusting bedclothes, sheets and blankets)?: 4  Sitting down on and standing up from a chair with arms (e.g., wheelchair, bedside commode, etc.): 4  Moving from lying on back to sitting on the side of the bed?: 4  Moving to and from a bed to a chair (including a wheelchair)?: 4  Need to walk in hospital room?: 4  Climbing 3-5 steps with a railing?: 1  Basic Mobility Total Score: 21       Treatment & Education:  Pt performed 30 reps (B) LE exercises: ap, quad set, glut set, straight leg raise, hip ab/adduction, long arc quad, heel slide with assist and rest as needed      Patient left up in chair with all lines intact, call button in reach, and cousin present..    GOALS:   Multidisciplinary Problems       Physical Therapy Goals          Problem: Physical Therapy    Goal Priority Disciplines Outcome Goal Variances Interventions   Physical Therapy Goal     PT, PT/OT Ongoing, Progressing     Description: Short Term Goals to be met by: 23    Patient will increase functional independence with mobility by performin. Supine to sit with Stand by assist  2. Sit to stand transfer with Stand by assist using Rolling walker  3. Bed to chair transfer with Stand by assist using standard walker  4. Gait  x 100 feet with Stand by assist using Rolling walker  5. Lower extremity exercise program x30 reps per handout, with assistance as needed    Long Term Goals to be met by: 10/5/23    Pt will regain full independent functional mobility with Rolling walker to return to home situation and prior activities of daily living.                        Time Tracking:     PT Received On: 23  PT Start Time: 956     PT Stop Time: 1023  PT Total Time (min): 27 min     Billable Minutes: Gait Training 8 and Therapeutic Exercise 15    Treatment Type: Treatment  PT/PTA: PTA     Number of PTA visits since last PT visit: 2     2023

## 2023-09-08 NOTE — SUBJECTIVE & OBJECTIVE
Interval History:     Review of Systems   Constitutional:  Positive for activity change. Negative for fatigue.   Respiratory:  Negative for chest tightness and shortness of breath.    Cardiovascular:  Positive for leg swelling. Negative for chest pain.   Gastrointestinal:  Positive for abdominal distention and anal bleeding.   Skin:  Negative for rash and wound.   Neurological:  Negative for tremors.     Objective:     Vital Signs (Most Recent):  Temp: 97.4 °F (36.3 °C) (09/08/23 0700)  Pulse: 68 (09/08/23 0700)  Resp: 18 (09/08/23 0806)  BP: 133/61 (09/08/23 0700)  SpO2: 99 % (09/08/23 0700) Vital Signs (24h Range):  Temp:  [97.4 °F (36.3 °C)-98.6 °F (37 °C)] 97.4 °F (36.3 °C)  Pulse:  [65-75] 68  Resp:  [16-20] 18  SpO2:  [99 %-100 %] 99 %  BP: (108-146)/(57-86) 133/61     Weight: (!) 156.5 kg (345 lb 0.3 oz)  Body mass index is 57.41 kg/m².  No intake or output data in the 24 hours ending 09/08/23 1050      Physical Exam  Vitals and nursing note reviewed.   Constitutional:       Appearance: He is obese. He is ill-appearing.   HENT:      Head: Normocephalic.      Right Ear: External ear normal.      Left Ear: External ear normal.      Nose: Nose normal.      Mouth/Throat:      Mouth: Mucous membranes are moist.      Pharynx: Oropharynx is clear.   Eyes:      General: Scleral icterus present.   Cardiovascular:      Rate and Rhythm: Normal rate and regular rhythm.      Heart sounds: Normal heart sounds.   Pulmonary:      Breath sounds: Normal breath sounds.   Abdominal:      General: There is distension.   Musculoskeletal:      Right lower leg: Edema present.      Left lower leg: Edema present.   Skin:     General: Skin is warm and dry.      Capillary Refill: Capillary refill takes less than 2 seconds.   Neurological:      Mental Status: He is oriented to person, place, and time.             Significant Labs: All pertinent labs within the past 24 hours have been reviewed.    Significant Imaging: I have reviewed all  pertinent imaging results/findings within the past 24 hours.

## 2023-09-08 NOTE — ASSESSMENT & PLAN NOTE
MELD score 22 points. 19.6% with estimated 3 month mortality  CIWA score 0    MRCP reveals cirrhosis and multiple gallstones    8/27: Pt is noted to have scleral icterus, bili 5.5, and continued hematochezia.   Put in for transfer ([facility] order) for higher level of care. Patient accepted at [facility] awaiting on bed.   8/30: pending transfer from [facility].   8/31: pending transfer.   9/1: pending transfer  9/3; pending transfer, no beds available at this time.   9/5: pending transfer  9/6: pending transfer  9/7: pending transfer.   9/8:  We continue to aggressively treat patient's severe cirrhosis with elevated MELD score.  Per GI, he would benefit from an emergent evaluation for transplant.  If no bed available at [facility].  If patient continues to improve may consider outpatient transplant evaluation and discharge patient this weekend.

## 2023-09-08 NOTE — ASSESSMENT & PLAN NOTE
Likely secondary to liver cirrhosis    Per Dr. Gusman:  Active arterial bleeding on endoscopy in anal canal; too friable for clip. (In OR) Arterial bleeding at the 9 o'clock position in the anal canal; multiple bleeding hemorrhoidal plexus at 6 o'clock position on the posterior canal and at 3 o'clock position    Patient received 2 units of packed red blood cells in the OR and we will give FFP     total units of blood received 8 units.   8/25: No bleeding overnight  8/26/23 no obvious bleeding on rectal dressing    8/27: Rectal bleeding noted this morning. Rectal dressing/pad saturated with blod and clots. 1U PRBC ordered.   8/30: rectal bleeding this morning one unit of RBC ordered.   8/31: rectal bleeding this morning one unit of RBC ordered.   9/1: 1 unit of blood over night   9/3: received one unit of FFP yesterday and vitamin K, H/H this morning 5.4/16.5, will transfuse with 2 units pRBCs  9/4: H/H 8.4/24.6, will continue to monitor.   9/5: H&H stable 8.7/26.2 and will continue to  Monitor  9/6: H/H 8.3/24.3, will continue to monitor.   9/7: H/H 8.8/26.7, will continue to monitor.   9/8:  Hg 8.3.  Hemoglobin is beginning to stabilize.  Consider discharge in next 1-2 days.

## 2023-09-08 NOTE — PROGRESS NOTES
[facility] Hospital Medicine  Progress Note    Patient Name: John Spears  MRN: [id number]  Patient Class: IP- Inpatient   Admission Date: 8/19/2023  Length of Stay: 19 days  Attending Physician: Karen Chamorro MD  Primary Care Provider: Ruben, Provider        Subjective:     Principal Problem:Lower GI bleed requiring more than 4 units of blood in 24 hours, ICU, or surgery    HPI:  Patient is a 49 y/o male with PMH of HTN, HLD, cirrhosis of the liver and hemorrhoids who presents to the ED with complaint of COB and swelling of the abdomen, legs and feet that started about 1 month ago. Patient states that the swelling and SOB has been worsening for the last 2 weeks. Patient reports passing dark blood per rectum with bowel movement for the last 2 weeks. Patient reports having intermittent RUQ when he eats fried foot. Patient reports chills but no fever. Patient denies nausea, vomiting, chest pain or urinary changes. Patient reports drinking about 10 beers of 16 ounces per day for the 3 years and for the last months he stopped drinking beer and started drinking  liquor. He states that 1 fifth of liquor lasts for 3 days. Patient reports withdrawal symptoms in the past. Patient reports that he quit smoking cigarettes 5 years ago.      In the ED showed /67, HR 90, RR 16, SpO2 98% and afebrile. Blood work showed H/H 6.1/19, WBC 5.6, PLT 78, PT 21.2, INR 1.87, PTT 48.2. Sodium 134, potassium 2.8, Alkaline phosphatase 166, Total bilirrubin 6.5, AST/ALST 61/41, p-, troponin 16.7. UA negative for infection or blood. FOBT negative. EKG nsr with HR 89. Patient is receiving PRBC in the ER at this moment and electrolytes are being replenished. Patient will be admitted to the hospital for further management.       Overview/Hospital Course:  No notes on file    Interval History:     Review of Systems   Constitutional:  Positive for activity change. Negative for  fatigue.   Respiratory:  Negative for chest tightness and shortness of breath.    Cardiovascular:  Positive for leg swelling. Negative for chest pain.   Gastrointestinal:  Positive for abdominal distention and anal bleeding.   Skin:  Negative for rash and wound.   Neurological:  Negative for tremors.     Objective:     Vital Signs (Most Recent):  Temp: 97.4 °F (36.3 °C) (09/08/23 0700)  Pulse: 68 (09/08/23 0700)  Resp: 18 (09/08/23 0806)  BP: 133/61 (09/08/23 0700)  SpO2: 99 % (09/08/23 0700) Vital Signs (24h Range):  Temp:  [97.4 °F (36.3 °C)-98.6 °F (37 °C)] 97.4 °F (36.3 °C)  Pulse:  [65-75] 68  Resp:  [16-20] 18  SpO2:  [99 %-100 %] 99 %  BP: (108-146)/(57-86) 133/61     Weight: (!) 156.5 kg (345 lb 0.3 oz)  Body mass index is 57.41 kg/m².  No intake or output data in the 24 hours ending 09/08/23 1050      Physical Exam  Vitals and nursing note reviewed.   Constitutional:       Appearance: He is obese. He is ill-appearing.   HENT:      Head: Normocephalic.      Right Ear: External ear normal.      Left Ear: External ear normal.      Nose: Nose normal.      Mouth/Throat:      Mouth: Mucous membranes are moist.      Pharynx: Oropharynx is clear.   Eyes:      General: Scleral icterus present.   Cardiovascular:      Rate and Rhythm: Normal rate and regular rhythm.      Heart sounds: Normal heart sounds.   Pulmonary:      Breath sounds: Normal breath sounds.   Abdominal:      General: There is distension.   Musculoskeletal:      Right lower leg: Edema present.      Left lower leg: Edema present.   Skin:     General: Skin is warm and dry.      Capillary Refill: Capillary refill takes less than 2 seconds.   Neurological:      Mental Status: He is oriented to person, place, and time.             Significant Labs: All pertinent labs within the past 24 hours have been reviewed.    Significant Imaging: I have reviewed all pertinent imaging results/findings within the past 24 hours.      Assessment/Plan:      * Lower GI  bleed requiring more than 4 units of blood   Likely secondary to liver cirrhosis    Per Dr. Gusman:  Active arterial bleeding on endoscopy in anal canal; too friable for clip. (In OR) Arterial bleeding at the 9 o'clock position in the anal canal; multiple bleeding hemorrhoidal plexus at 6 o'clock position on the posterior canal and at 3 o'clock position    Patient received 2 units of packed red blood cells in the OR and we will give FFP     total units of blood received 8 units.   8/25: No bleeding overnight  8/26/23 no obvious bleeding on rectal dressing    8/27: Rectal bleeding noted this morning. Rectal dressing/pad saturated with blod and clots. 1U PRBC ordered.   8/30: rectal bleeding this morning one unit of RBC ordered.   8/31: rectal bleeding this morning one unit of RBC ordered.   9/1: 1 unit of blood over night   9/3: received one unit of FFP yesterday and vitamin K, H/H this morning 5.4/16.5, will transfuse with 2 units pRBCs  9/4: H/H 8.4/24.6, will continue to monitor.   9/5: H&H stable 8.7/26.2 and will continue to  Monitor  9/6: H/H 8.3/24.3, will continue to monitor.   9/7: H/H 8.8/26.7, will continue to monitor.   9/8:  Hg 8.3.  Hemoglobin is beginning to stabilize.  Consider discharge in next 1-2 days.     Alcoholic cirrhosis of liver  MELD score 22 points. 19.6% with estimated 3 month mortality  CIWA score 0    MRCP reveals cirrhosis and multiple gallstones    8/27: Pt is noted to have scleral icterus, bili 5.5, and continued hematochezia.   Put in for transfer ([facility] order) for higher level of care. Patient accepted at [facility] awaiting on bed.   8/30: pending transfer from [facility].   8/31: pending transfer.   9/1: pending transfer  9/3; pending transfer, no beds available at this time.   9/5: pending transfer  9/6: pending transfer  9/7: pending transfer.   9/8:  We continue to aggressively treat patient's severe cirrhosis with elevated MELD score.  Per GI, he would benefit from an emergent evaluation  for transplant.  If no bed available at [facility].  If patient continues to improve may consider outpatient transplant evaluation and discharge patient this weekend.        Hyperammonemia  8/27: Continue with rifaxmin  Started on lactulose on 8/26  Ammonia level 61  Will continue to monitor      Acute lower GI bleeding  Patient received 2 units of packed red blood cells in the OR and we will give FFP  8/25: Hgb 7.8  8/26/23 no obvious rectal bleeding  8/30: 1 unit of blood ordered.   8/31: 1 unit of blood   9/1; 1 unit of blood overnight  9/3: 1 unit of FFP yesterday. H/H this morning 5.4/16.5, will transfuse with 2 units pRBCs  9/4: H/H 8.4/24.6, will continue to monitor.   9/6: H/H 8.3/24.3, will continue to monitor.   9/7: H/H 8.8/26.7, will continue to monitor.     Hypertension  Monitor BP    Anemia  Acute blood loss   Per significant other, had hematochezia 3x this morning. Noted pad saturated with blood and clots.  Hgb 7.2. 1U PRBC noted. Dr. Mahajan informed.   Monitor CBC  8/30: Hgb in morning was 6.1, one unit of Rbc ordered on noon check it was 7.2 will get H/H q 6hr, will continue to monitor and transfuse appropriately, will hopefully transfer soon.   8/31: Hgb this morning was 6.1, one unit of RBC ordered, at noon H/H was 7.3 will continue to trend H/H  9/1: Hgb 8.8 this morning required one unit of RBC last night.   9/3: transfused one unit of FFP yesterday. H/H this morning 5.4/16.5, will transfuse with 2 units pRBCs  9/4 H/H 8.4/24/6, will continue to monitor.   9/5: H&H 8.7/26.2 and will continue to monitor  9/6: H/H 8.3/24.3, will continue to monitor.   9/8: H/H 8.8/26.7, will continue to monitor.     Hypoalbuminemia  Likely from malturition + or - synthetic function of the liver  C/w Spironolactone and lasix      VTE Risk Mitigation (From admission, onward)         Ordered     Reason for No Pharmacological VTE Prophylaxis  Once        Question:  Reasons:  Answer:  Active Bleeding    08/20/23 0043     IP VTE  HIGH RISK PATIENT  Once         08/20/23 0043     Place sequential compression device  Until discontinued         08/20/23 0043                Discharge Planning   LUCRECIA:      Code Status: Full Code   Is the patient medically ready for discharge?:     Reason for patient still in hospital (select all that apply): Treatment  Discharge Plan A: Home with family                  Karen Chamorro MD  Department of Hospital Medicine   [facility] - [facility]

## 2023-09-08 NOTE — PLAN OF CARE
Problem: Adult Inpatient Plan of Care  Goal: Plan of Care Review  Outcome: Ongoing, Progressing  Goal: Absence of Hospital-Acquired Illness or Injury  Outcome: Ongoing, Progressing  Goal: Optimal Comfort and Wellbeing  Outcome: Ongoing, Progressing     Problem: Bariatric Environmental Safety  Goal: Safety Maintained with Care  Outcome: Ongoing, Progressing     Problem: Fall Injury Risk  Goal: Absence of Fall and Fall-Related Injury  Outcome: Ongoing, Progressing     Problem: Skin Injury Risk Increased  Goal: Skin Health and Integrity  Outcome: Ongoing, Progressing

## 2023-09-09 NOTE — DISCHARGE SUMMARY
[facility] Hospital Medicine  Discharge Summary      Patient Name: John Spears  MRN: [id number]  JEAN-CLAUDE: [id number]  Patient Class: IP- Inpatient  Admission Date: 8/19/2023  Hospital Length of Stay: 19 days  Discharge Date and Time:  09/09/2023 2:52 AM  Attending Physician: Caprice att. providers found   Discharging Provider: Agustin Rodríguez DO  Primary Care Provider: Romel Miranda    Primary Care Team: Networked reference to record PCT     HPI:   Patient is a 49 y/o male with PMH of HTN, HLD, cirrhosis of the liver and hemorrhoids who presents to the ED with complaint of COB and swelling of the abdomen, legs and feet that started about 1 month ago. Patient states that the swelling and SOB has been worsening for the last 2 weeks. Patient reports passing dark blood per rectum with bowel movement for the last 2 weeks. Patient reports having intermittent RUQ when he eats fried foot. Patient reports chills but no fever. Patient denies nausea, vomiting, chest pain or urinary changes. Patient reports drinking about 10 beers of 16 ounces per day for the 3 years and for the last months he stopped drinking beer and started drinking  liquor. He states that 1 fifth of liquor lasts for 3 days. Patient reports withdrawal symptoms in the past. Patient reports that he quit smoking cigarettes 5 years ago.      In the ED showed /67, HR 90, RR 16, SpO2 98% and afebrile. Blood work showed H/H 6.1/19, WBC 5.6, PLT 78, PT 21.2, INR 1.87, PTT 48.2. Sodium 134, potassium 2.8, Alkaline phosphatase 166, Total bilirrubin 6.5, AST/ALST 61/41, p-, troponin 16.7. UA negative for infection or blood. FOBT negative. EKG nsr with HR 89. Patient is receiving PRBC in the ER at this moment and electrolytes are being replenished. Patient will be admitted to the hospital for further management.       Procedure(s) (LRB):  EXAM UNDER ANESTHESIA, DIGITAL, RECTUM (N/A)  RETURN TO OPERATING ROOM, FOR  MINOR POSTOPERATIVE HEMORRHAGE CONTROL  HEMORRHOIDECTOMY (N/A)      Hospital Course:   Patient is a 48 y.o.m. that presented to [facility] ED for SOB and swelling of Abd and LE x1 month. Work up patient was found to have a GI bleed. Gen Surgery found and active arterial bleeding on endoscopy in anal canal; too friable for clip. Gen Surgery recommended patient see a hepatobiliary specialist. All hospital in the area that had this specialty were without beds. The transfer was further delayed due to patient not having insurance. Patient was able to obtain insurance while in the hospital. While waiting on a bed at either [facility] or [facility] patient was treat for Alcoholic cirrhosis of the liver, HTN and acute blood loss anemia. Patient received a total for 14 unit of pRBC and 3 units of FFP during his stay. Patient has reached maximum medical benefit from this hospital and was transferred to [facility] for higher level of care.        Goals of Care Treatment Preferences:  Code Status: Full Code      Consults:   Consults (From admission, onward)        Status Ordering Provider     Inpatient consult to General Surgery  Once        Provider:  Michael Gusman DO    Completed KAREN, REHMAT U     Inpatient consult to Gastroenterology  Once        Provider:  GIO Horton MD    Completed JAYLON POWELL          Cardiac/Vascular  Hypertension  Monitor BP    Oncology  Anemia  Acute blood loss   Per significant other, had hematochezia 3x this morning. Noted pad saturated with blood and clots.  Hgb 7.2. 1U PRBC noted. Dr. Mahajan informed.   Monitor CBC  8/30: Hgb in morning was 6.1, one unit of Rbc ordered on noon check it was 7.2 will get H/H q 6hr, will continue to monitor and transfuse appropriately, will hopefully transfer soon.   8/31: Hgb this morning was 6.1, one unit of RBC ordered, at noon H/H was 7.3 will continue to trend H/H  9/1: Hgb 8.8 this morning required one unit of RBC last night.   9/3: transfused one unit of FFP yesterday. H/H  this morning 5.4/16.5, will transfuse with 2 units pRBCs  9/4 H/H 8.4/24/6, will continue to monitor.   9/5: H&H 8.7/26.2 and will continue to monitor  9/6: H/H 8.3/24.3, will continue to monitor.   9/8: H/H 8.8/26.7, will continue to monitor.     Hypoalbuminemia  Likely from malturition + or - synthetic function of the liver  C/w Spironolactone and lasix    Endocrine  Hyperammonemia-resolved as of 9/8/2023 8/27: Continue with rifaxmin  Started on lactulose on 8/26  Ammonia level 61  Will continue to monitor      GI  * Lower GI bleed requiring more than 4 units of blood   Likely secondary to liver cirrhosis    Per Dr. Gusman:  Active arterial bleeding on endoscopy in anal canal; too friable for clip. (In OR) Arterial bleeding at the 9 o'clock position in the anal canal; multiple bleeding hemorrhoidal plexus at 6 o'clock position on the posterior canal and at 3 o'clock position    Patient received 2 units of packed red blood cells in the OR and we will give FFP     total units of blood received 8 units.   8/25: No bleeding overnight  8/26/23 no obvious bleeding on rectal dressing    8/27: Rectal bleeding noted this morning. Rectal dressing/pad saturated with blod and clots. 1U PRBC ordered.   8/30: rectal bleeding this morning one unit of RBC ordered.   8/31: rectal bleeding this morning one unit of RBC ordered.   9/1: 1 unit of blood over night   9/3: received one unit of FFP yesterday and vitamin K, H/H this morning 5.4/16.5, will transfuse with 2 units pRBCs  9/4: H/H 8.4/24.6, will continue to monitor.   9/5: H&H stable 8.7/26.2 and will continue to  Monitor  9/6: H/H 8.3/24.3, will continue to monitor.   9/7: H/H 8.8/26.7, will continue to monitor.   9/8:  Hg 8.3.  Hemoglobin is beginning to stabilize.  Consider discharge in next 1-2 days.     Bleeding hemorrhoid        Acute lower GI bleeding  Patient received 2 units of packed red blood cells in the OR and we will give FFP  8/25: Hgb 7.8  8/26/23 no obvious  rectal bleeding  8/30: 1 unit of blood ordered.   8/31: 1 unit of blood   9/1; 1 unit of blood overnight  9/3: 1 unit of FFP yesterday. H/H this morning 5.4/16.5, will transfuse with 2 units pRBCs  9/4: H/H 8.4/24.6, will continue to monitor.   9/6: H/H 8.3/24.3, will continue to monitor.   9/7: H/H 8.8/26.7, will continue to monitor.     Alcoholic cirrhosis of liver  MELD score 22 points. 19.6% with estimated 3 month mortality  CIWA score 0    MRCP reveals cirrhosis and multiple gallstones    8/27: Pt is noted to have scleral icterus, bili 5.5, and continued hematochezia.   Put in for transfer ([facility] order) for higher level of care. Patient accepted at [facility] awaiting on bed.   8/30: pending transfer from [facility].   8/31: pending transfer.   9/1: pending transfer  9/3; pending transfer, no beds available at this time.   9/5: pending transfer  9/6: pending transfer  9/7: pending transfer.   9/8:  We continue to aggressively treat patient's severe cirrhosis with elevated MELD score.  Per GI, he would benefit from an emergent evaluation for transplant.  If no bed available at [facility].  If patient continues to improve may consider outpatient transplant evaluation and discharge patient this weekend.          Final Active Diagnoses:    Diagnosis Date Noted POA    PRINCIPAL PROBLEM:  Lower GI bleed requiring more than 4 units of blood  [K92.2] 08/20/2023 Yes    Bleeding hemorrhoid [K64.9] 09/08/2023 Yes    Alcoholic cirrhosis of liver [K70.30] 08/20/2023 Yes    Hypertension [I10] 08/20/2023 Yes    Acute lower GI bleeding [K92.2] 08/20/2023 Yes    Hypoalbuminemia [E88.09] 08/19/2023 Yes    Anemia [D64.9] 08/19/2023 Yes      Problems Resolved During this Admission:    Diagnosis Date Noted Date Resolved POA    Hyperammonemia [E72.20] 08/25/2023 09/08/2023 Yes    Ascites [R18.8] 08/19/2023 08/20/2023 Yes       Discharged Condition: stable    Disposition: Another Health Care Inst*    Follow Up:    Patient Instructions:   No  discharge procedures on file.    Significant Diagnostic Studies: Labs: All labs within the past 24 hours have been reviewed    Pending Diagnostic Studies:     Procedure Component Value Units Date/Time    EXTRA TUBES [[id number]] Collected: 08/25/23 0916    Order Status: Sent Lab Status: In process Updated: 08/25/23 0916    Specimen: Blood, Venous     Narrative:      The following orders were created for panel order EXTRA TUBES.  Procedure                               Abnormality         Status                     ---------                               -----------         ------                     Red Top Hold[[id number]]                                     In process                 Lavender Top Hold[[id number]]                                In process                   Please view results for these tests on the individual orders.    EXTRA TUBES [[id number]] Collected: 08/22/23 0811    Order Status: Sent Lab Status: In process Updated: 08/22/23 0811    Specimen: Blood, Venous     Narrative:      The following orders were created for panel order EXTRA TUBES.  Procedure                               Abnormality         Status                     ---------                               -----------         ------                     Pink Top Hold[[id number]]                                    In process                   Please view results for these tests on the individual orders.    EXTRA TUBES [[id number]] Collected: 08/19/23 2220    Order Status: Sent Lab Status: In process Updated: 08/19/23 2225    Specimen: Blood, Venous     Narrative:      The following orders were created for panel order EXTRA TUBES.  Procedure                               Abnormality         Status                     ---------                               -----------         ------                     Lavender Top Hold[[id number]]                                In process                   Please view results for these tests on the individual  orders.         Medications:  Reconciled Home Medications:      Medication List      CONTINUE taking these medications    atenoloL-chlorthalidone 50-25 mg Tab  Commonly known as: TENORETIC  Take 1 tablet by mouth.     rosuvastatin 5 MG tablet  Commonly known as: CRESTOR  Take 5 mg by mouth.            Indwelling Lines/Drains at time of discharge:   Lines/Drains/Airways     None                 Time spent on the discharge of patient: 25 minutes         Agustin Rodríguez DO  Department of Hospital Medicine  [facility] - [address] [place] [address]

## 2023-09-09 NOTE — SUBJECTIVE & OBJECTIVE
Interval History: Patient was seen and evaluated in bed with no acute event overnight.     Review of Systems   Constitutional:  Positive for activity change. Negative for fatigue.   Respiratory:  Negative for chest tightness and shortness of breath.    Cardiovascular:  Positive for leg swelling. Negative for chest pain.   Gastrointestinal:  Positive for abdominal distention and anal bleeding.   Skin:  Negative for rash and wound.   Neurological:  Negative for tremors.     Objective:     Vital Signs (Most Recent):  Temp: 98.5 °F (36.9 °C) (09/08/23 1930)  Pulse: 79 (09/08/23 1930)  Resp: 18 (09/08/23 1930)  BP: (!) 145/72 (09/08/23 1930)  SpO2: 98 % (09/08/23 1930) Vital Signs (24h Range):  Temp:  [97.4 °F (36.3 °C)-98.6 °F (37 °C)] 98.5 °F (36.9 °C)  Pulse:  [68-79] 79  Resp:  [16-18] 18  SpO2:  [98 %-100 %] 98 %  BP: (108-145)/(57-72) 145/72     Weight: (!) 156.5 kg (345 lb 0.3 oz)  Body mass index is 57.41 kg/m².    Intake/Output Summary (Last 24 hours) at 9/8/2023 2209  Last data filed at 9/8/2023 1657  Gross per 24 hour   Intake --   Output 1200 ml   Net -1200 ml         Physical Exam  Vitals and nursing note reviewed.   Constitutional:       Appearance: He is obese. He is ill-appearing.   HENT:      Head: Normocephalic.      Right Ear: External ear normal.      Left Ear: External ear normal.      Nose: Nose normal.      Mouth/Throat:      Mouth: Mucous membranes are moist.      Pharynx: Oropharynx is clear.   Eyes:      General: Scleral icterus present.   Cardiovascular:      Rate and Rhythm: Normal rate and regular rhythm.      Heart sounds: Normal heart sounds.   Pulmonary:      Breath sounds: Normal breath sounds.   Abdominal:      General: There is distension.   Musculoskeletal:      Right lower leg: Edema present.      Left lower leg: Edema present.   Skin:     General: Skin is warm and dry.      Capillary Refill: Capillary refill takes less than 2 seconds.   Neurological:      Mental Status: He is  oriented to person, place, and time.             Significant Labs: All pertinent labs within the past 24 hours have been reviewed.    Significant Imaging: I have reviewed all pertinent imaging results/findings within the past 24 hours.  I have reviewed and interpreted all pertinent imaging results/findings within the past 24 hours.

## 2023-09-09 NOTE — HOSPITAL COURSE
Patient is a 48 y.o.m. that presented to [facility] ED for SOB and swelling of Abd and LE x1 month. Work up patient was found to have a GI bleed. Gen Surgery found and active arterial bleeding on endoscopy in anal canal; too friable for clip. Gen Surgery recommended patient see a hepatobiliary specialist. All hospital in the area that had this specialty were without beds. The transfer was further delayed due to patient not having insurance. Patient was able to obtain insurance while in the hospital. While waiting on a bed at either [facility] or [facility] patient was treat for Alcoholic cirrhosis of the liver, HTN and acute blood loss anemia. Patient received a total for 14 unit of pRBC and 3 units of FFP during his stay. Patient has reached maximum medical benefit from this hospital and was transferred to [facility] for higher level of care.

## 2023-09-09 NOTE — ASSESSMENT & PLAN NOTE
MELD score 22 points. 19.6% with estimated 3 month mortality  CIWA score 0    MRCP reveals cirrhosis and multiple gallstones    8/27: Pt is noted to have scleral icterus, bili 5.5, and continued hematochezia.   Put in for transfer ([facility] order) for higher level of care. Patient accepted at [facility] awaiting on bed.   8/30: pending transfer from [facility].   8/31: pending transfer.   9/1: pending transfer  9/3; pending transfer, no beds available at this time.   9/5: pending transfer  9/6: pending transfer  9/7: pending transfer.   9/8:  We continue to aggressively treat patient's severe cirrhosis with elevated MELD score.  Per GI, he would benefit from an emergent evaluation for transplant.  If no bed available at [facility].  If patient continues to improve may consider outpatient transplant evaluation and discharge patient this weekend.

## 2023-09-11 NOTE — PLAN OF CARE
[facility]  [facility]charge Final Note    Primary Care Provider: Romel Miranda    Expected Discharge Date: 9/8/2023    Final Discharge Note (most recent)       Final Note - 09/11/23 0844          Final Note    Assessment Type Final Discharge Note     Anticipated Discharge Disposition Another Health Care Institution Not Defined        Post-Acute Status    Discharge Delays None known at this time                   Patient discharged to [facility] on 9/8/23.

## 2023-10-12 NOTE — PHYSICIAN QUERY
PT Name: John Spears  MR #: [id number]     DOCUMENTATION CLARIFICATION     CDS:  Shahnaz DEL VALLE RN   Contact information:  [email or web address]     This form is a permanent document in the medical record.    Query Date: October 12, 2023    By submitting this query, we are merely seeking further clarification of documentation to reflect the severity of illness of your patient. Please utilize your independent clinical judgment when addressing the question(s) below.    The Medical Record reflects the following:     Indicators   Supporting Clinical Findings Location in Medical Record   x Lab Value(s)  08/19/23 19:16 08/20/23 04:48 08/21/23 09:31 08/22/23 07:05 08/23/23 04:23   Potassium 2.8  2.7  3.3  3.3  3.5    Lab 8/19-8/23/23   x Treatment/Medication potassium chloride SA CR tablet 40 mEq   Oral Frequency: ED 1 Time    potassium chloride 10 mEq in 100 mL IVPB   Ordered Dose: 40 mEq Once   potassium chloride SA CR tablet 40 mEq  Oral  Once     MD orders 8/19/23    MD orders 8/20/23    Other       Provider, please specify the diagnosis that correspond(s) to the above indicators:    [  x  ] Hypokalemia   [    ] Other electrolyte disturbance (please specify): __________       Please document in your progress notes daily for the duration of treatment until resolved, and include in your discharge summary.

## 2023-11-27 PROBLEM — K62.5 ANORECTAL HEMORRHAGE: Status: RESOLVED | Noted: 2023-08-24 | Resolved: 2023-11-27

## 2023-12-09 ENCOUNTER — HOSPITAL ENCOUNTER (EMERGENCY)
Facility: HOSPITAL | Age: 48
Discharge: ANOTHER HOSPITAL | End: 2023-12-09
Payer: COMMERCIAL

## 2023-12-09 VITALS
HEART RATE: 92 BPM | RESPIRATION RATE: 18 BRPM | SYSTOLIC BLOOD PRESSURE: 112 MMHG | BODY MASS INDEX: 43.54 KG/M2 | HEIGHT: 64 IN | TEMPERATURE: 99 F | DIASTOLIC BLOOD PRESSURE: 54 MMHG | OXYGEN SATURATION: 98 % | WEIGHT: 255 LBS

## 2023-12-09 DIAGNOSIS — R10.30 LOWER ABDOMINAL PAIN: ICD-10-CM

## 2023-12-09 DIAGNOSIS — K35.80 ACUTE APPENDICITIS, UNSPECIFIED ACUTE APPENDICITIS TYPE: Primary | ICD-10-CM

## 2023-12-09 DIAGNOSIS — E86.0 DEHYDRATION: ICD-10-CM

## 2023-12-09 DIAGNOSIS — R50.9 FEVER, UNSPECIFIED FEVER CAUSE: ICD-10-CM

## 2023-12-09 LAB
ALBUMIN SERPL BCP-MCNC: 1.9 G/DL (ref 3.5–5)
ALBUMIN SERPL BCP-MCNC: 2 G/DL (ref 3.5–5)
ALBUMIN/GLOB SERPL: 0.4 {RATIO}
ALBUMIN/GLOB SERPL: 0.4 {RATIO}
ALP SERPL-CCNC: 192 U/L (ref 45–115)
ALP SERPL-CCNC: 208 U/L (ref 45–115)
ALT SERPL W P-5'-P-CCNC: 46 U/L (ref 16–61)
ALT SERPL W P-5'-P-CCNC: 47 U/L (ref 16–61)
AMYLASE SERPL-CCNC: 55 U/L (ref 25–115)
ANION GAP SERPL CALCULATED.3IONS-SCNC: 13 MMOL/L (ref 7–16)
ANION GAP SERPL CALCULATED.3IONS-SCNC: 14 MMOL/L (ref 7–16)
AST SERPL W P-5'-P-CCNC: 67 U/L (ref 15–37)
AST SERPL W P-5'-P-CCNC: 76 U/L (ref 15–37)
BACTERIA #/AREA URNS HPF: ABNORMAL /HPF
BASOPHILS # BLD AUTO: 0.02 K/UL (ref 0–0.2)
BASOPHILS NFR BLD AUTO: 0.2 % (ref 0–1)
BILIRUB SERPL-MCNC: 14.2 MG/DL (ref ?–1.2)
BILIRUB SERPL-MCNC: 14.9 MG/DL (ref ?–1.2)
BILIRUB UR QL STRIP: ABNORMAL
BUN SERPL-MCNC: 38 MG/DL (ref 7–18)
BUN SERPL-MCNC: 38 MG/DL (ref 7–18)
BUN/CREAT SERPL: 25 (ref 6–20)
BUN/CREAT SERPL: 26 (ref 6–20)
CALCIUM SERPL-MCNC: 7.8 MG/DL (ref 8.5–10.1)
CALCIUM SERPL-MCNC: 8.2 MG/DL (ref 8.5–10.1)
CHLORIDE SERPL-SCNC: 100 MMOL/L (ref 98–107)
CHLORIDE SERPL-SCNC: 99 MMOL/L (ref 98–107)
CLARITY UR: CLEAR
CO2 SERPL-SCNC: 22 MMOL/L (ref 21–32)
CO2 SERPL-SCNC: 22 MMOL/L (ref 21–32)
COLOR UR: ABNORMAL
CREAT SERPL-MCNC: 1.45 MG/DL (ref 0.7–1.3)
CREAT SERPL-MCNC: 1.55 MG/DL (ref 0.7–1.3)
DIFFERENTIAL METHOD BLD: ABNORMAL
EGFR (NO RACE VARIABLE) (RUSH/TITUS): 54 ML/MIN/1.73M2
EGFR (NO RACE VARIABLE) (RUSH/TITUS): 59 ML/MIN/1.73M2
EOSINOPHIL # BLD AUTO: 0.09 K/UL (ref 0–0.5)
EOSINOPHIL NFR BLD AUTO: 0.8 % (ref 1–4)
ERYTHROCYTE [DISTWIDTH] IN BLOOD BY AUTOMATED COUNT: 15 % (ref 11.5–14.5)
FLUAV AG UPPER RESP QL IA.RAPID: NEGATIVE
FLUBV AG UPPER RESP QL IA.RAPID: NEGATIVE
GLOBULIN SER-MCNC: 4.9 G/DL (ref 2–4)
GLOBULIN SER-MCNC: 5.2 G/DL (ref 2–4)
GLUCOSE SERPL-MCNC: 93 MG/DL (ref 74–106)
GLUCOSE SERPL-MCNC: 95 MG/DL (ref 74–106)
GLUCOSE UR STRIP-MCNC: 100 MG/DL
HCT VFR BLD AUTO: 28.9 % (ref 40–54)
HGB BLD-MCNC: 10 G/DL (ref 13.5–18)
KETONES UR STRIP-SCNC: 15 MG/DL
LACTATE SERPL-SCNC: 1.7 MMOL/L (ref 0.4–2)
LEUKOCYTE ESTERASE UR QL STRIP: NEGATIVE
LIPASE SERPL-CCNC: 135 U/L (ref 73–393)
LYMPHOCYTES # BLD AUTO: 0.76 K/UL (ref 1–4.8)
LYMPHOCYTES NFR BLD AUTO: 6.9 % (ref 27–41)
MCH RBC QN AUTO: 38.2 PG (ref 27–31)
MCHC RBC AUTO-ENTMCNC: 34.6 G/DL (ref 32–36)
MCV RBC AUTO: 110.3 FL (ref 80–96)
MONOCYTES # BLD AUTO: 1.91 K/UL (ref 0–0.8)
MONOCYTES NFR BLD AUTO: 17.3 % (ref 2–6)
MPC BLD CALC-MCNC: 12.5 FL (ref 9.4–12.4)
NEUTROPHILS # BLD AUTO: 8.23 K/UL (ref 1.8–7.7)
NEUTROPHILS NFR BLD AUTO: 74.8 % (ref 53–65)
NITRITE UR QL STRIP: NEGATIVE
PH UR STRIP: 5.5 PH UNITS
PLATELET # BLD AUTO: 41 K/UL (ref 150–400)
POTASSIUM SERPL-SCNC: 3.6 MMOL/L (ref 3.5–5.1)
POTASSIUM SERPL-SCNC: 3.8 MMOL/L (ref 3.5–5.1)
PROT SERPL-MCNC: 6.8 G/DL (ref 6.4–8.2)
PROT SERPL-MCNC: 7.2 G/DL (ref 6.4–8.2)
PROT UR QL STRIP: 30
RBC # BLD AUTO: 2.62 M/UL (ref 4.6–6.2)
RBC # UR STRIP: ABNORMAL /UL
RBC #/AREA URNS HPF: ABNORMAL /HPF
SARS-COV+SARS-COV-2 AG RESP QL IA.RAPID: NEGATIVE
SODIUM SERPL-SCNC: 131 MMOL/L (ref 136–145)
SODIUM SERPL-SCNC: 131 MMOL/L (ref 136–145)
SP GR UR STRIP: 1.01
SQUAMOUS #/AREA URNS LPF: ABNORMAL /LPF
UROBILINOGEN UR STRIP-ACNC: 1 MG/DL
WBC # BLD AUTO: 11.01 K/UL (ref 4.5–11)
WBC #/AREA URNS HPF: ABNORMAL /HPF

## 2023-12-09 PROCEDURE — 99285 PR EMERGENCY DEPT VISIT,LEVEL V: ICD-10-PCS | Mod: ,,, | Performed by: NURSE PRACTITIONER

## 2023-12-09 PROCEDURE — 99285 EMERGENCY DEPT VISIT HI MDM: CPT | Mod: 25

## 2023-12-09 PROCEDURE — 25000003 PHARM REV CODE 250: Performed by: NURSE PRACTITIONER

## 2023-12-09 PROCEDURE — 99285 EMERGENCY DEPT VISIT HI MDM: CPT | Mod: ,,, | Performed by: NURSE PRACTITIONER

## 2023-12-09 PROCEDURE — 96360 HYDRATION IV INFUSION INIT: CPT

## 2023-12-09 PROCEDURE — 81001 URINALYSIS AUTO W/SCOPE: CPT | Performed by: NURSE PRACTITIONER

## 2023-12-09 PROCEDURE — 80053 COMPREHEN METABOLIC PANEL: CPT | Mod: 59 | Performed by: NURSE PRACTITIONER

## 2023-12-09 PROCEDURE — 85025 COMPLETE CBC W/AUTO DIFF WBC: CPT | Performed by: NURSE PRACTITIONER

## 2023-12-09 PROCEDURE — 83605 ASSAY OF LACTIC ACID: CPT | Performed by: NURSE PRACTITIONER

## 2023-12-09 PROCEDURE — 87804 INFLUENZA ASSAY W/OPTIC: CPT | Performed by: NURSE PRACTITIONER

## 2023-12-09 PROCEDURE — 82150 ASSAY OF AMYLASE: CPT | Performed by: NURSE PRACTITIONER

## 2023-12-09 PROCEDURE — 87426 SARSCOV CORONAVIRUS AG IA: CPT | Performed by: NURSE PRACTITIONER

## 2023-12-09 PROCEDURE — 83690 ASSAY OF LIPASE: CPT | Performed by: NURSE PRACTITIONER

## 2023-12-09 RX ORDER — IBUPROFEN 600 MG/1
600 TABLET ORAL
Status: COMPLETED | OUTPATIENT
Start: 2023-12-09 | End: 2023-12-09

## 2023-12-09 RX ORDER — FERROUS SULFATE 325(65) MG
1 TABLET ORAL DAILY
COMMUNITY
Start: 2023-11-28 | End: 2024-11-27

## 2023-12-09 RX ORDER — FUROSEMIDE 40 MG/1
1 TABLET ORAL DAILY
COMMUNITY
Start: 2023-11-28 | End: 2023-12-28 | Stop reason: ALTCHOICE

## 2023-12-09 RX ORDER — LACTULOSE 10 G/15ML
20 SOLUTION ORAL; RECTAL
COMMUNITY
End: 2023-12-28 | Stop reason: ALTCHOICE

## 2023-12-09 RX ADMIN — IBUPROFEN 600 MG: 600 TABLET, FILM COATED ORAL at 04:12

## 2023-12-09 RX ADMIN — SODIUM CHLORIDE 1000 ML: 9 INJECTION, SOLUTION INTRAVENOUS at 06:12

## 2023-12-09 NOTE — ED PROVIDER NOTES
Encounter Date: 12/9/2023       History     Chief Complaint   Patient presents with    Abdominal Pain     Since thursday     47 y/o AAM with PMH of HTN, thyroid disease, alcohol abuse, anemia and hyperlipidemia presents pov with c/o lower abdominal pain, hematuria, and fever with onset last night. Patient states he was recently dx with cirrhosis of the liver in August. Patient is being followed by [facility]. Current med regimen includes Lasix, lactulose, and ferrous sulfate. Patient has f/u visit with pcp next week. Patient had hemorrhoid surgery in August and has a known umbilical hernia.        Review of patient's allergies indicates:  No Known Allergies  Past Medical History:   Diagnosis Date    Cirrhosis of liver 08/2023    Fatty liver     History of alcohol abuse     Hypertension     Mixed hyperlipidemia     Thyroid disease      Past Surgical History:   Procedure Laterality Date    ABDOMINAL SURGERY      DIGITAL RECTAL EXAMINATION UNDER ANESTHESIA N/A 8/20/2023    Procedure: EXAM UNDER ANESTHESIA, DIGITAL, RECTUM;  Surgeon: Michael Gusman DO;  Location: [facility] OR;  Service: General;  Laterality: N/A;    DIGITAL RECTAL EXAMINATION UNDER ANESTHESIA N/A 8/24/2023    Procedure: EXAM UNDER ANESTHESIA, DIGITAL, RECTUM;  Surgeon: Michael Gusman DO;  Location: [facility] OR;  Service: General;  Laterality: N/A;    EXCISIONAL HEMORRHOIDECTOMY N/A 8/20/2023    Procedure: HEMORRHOIDECTOMY;  Surgeon: Michael Gusman DO;  Location: [facility] OR;  Service: General;  Laterality: N/A;    EXCISIONAL HEMORRHOIDECTOMY N/A 8/24/2023    Procedure: HEMORRHOIDECTOMY;  Surgeon: Michael Gusman DO;  Location: [facility] OR;  Service: General;  Laterality: N/A;    RETURN TO OPERATING ROOM, FOR MINOR POSTOPERATIVE HEMORRHAGE CONTROL  8/24/2023    Procedure: RETURN TO OPERATING ROOM, FOR MINOR POSTOPERATIVE HEMORRHAGE CONTROL;  Surgeon: Michael Gusman DO;  Location: [facility] OR;  Service: General;;     History reviewed. No  pertinent family history.  Social History     Tobacco Use    Smoking status: Never    Smokeless tobacco: Never   Substance Use Topics    Alcohol use: Not Currently     Comment: recently quit beer and liquor as of 8/15/23 stated per pt    Drug use: Never     Review of Systems   Constitutional:  Positive for fever. Negative for chills.   HENT:  Negative for congestion, ear pain, sinus pressure and sore throat.    Respiratory: Negative.  Negative for apnea, cough, choking, chest tightness, shortness of breath, wheezing and stridor.    Cardiovascular:  Negative for chest pain, palpitations and leg swelling.   Gastrointestinal:  Positive for abdominal pain. Negative for abdominal distention, anal bleeding, blood in stool, constipation, diarrhea, nausea, rectal pain and vomiting.   Genitourinary:  Positive for hematuria. Negative for decreased urine volume, difficulty urinating, dysuria, enuresis, flank pain, frequency, genital sores, penile discharge, penile pain, penile swelling, scrotal swelling, testicular pain and urgency.   Musculoskeletal: Negative.    Neurological: Negative.    Psychiatric/Behavioral: Negative.     All other systems reviewed and are negative.      Physical Exam     Initial Vitals [12/09/23 1608]   BP Pulse Resp Temp SpO2   (!) 145/59 102 16 (!) 102.4 °F (39.1 °C) 95 %      MAP       --         Physical Exam    Nursing note and vitals reviewed.  Constitutional: He appears well-developed and well-nourished. No distress.   HENT:   Head: Normocephalic and atraumatic.   Eyes: Conjunctivae and EOM are normal. Scleral icterus is present.   Neck: Neck supple.   Normal range of motion.  Cardiovascular:  Normal rate, regular rhythm, normal heart sounds and intact distal pulses.     Exam reveals no gallop and no friction rub.       No murmur heard.  Pulmonary/Chest: Breath sounds normal. No respiratory distress. He has no wheezes. He has no rhonchi. He has no rales. He exhibits no tenderness.   Abdominal:  Abdomen is soft. Bowel sounds are normal. He exhibits no distension. There is no abdominal tenderness. There is no rebound.   Musculoskeletal:         General: No tenderness. Normal range of motion.      Cervical back: Normal range of motion and neck supple.     Lymphadenopathy:     He has no cervical adenopathy.   Neurological: He is alert and oriented to person, place, and time. He has normal strength.   Skin: Skin is warm and dry. Capillary refill takes less than 2 seconds.   Psychiatric: He has a normal mood and affect. His behavior is normal. Judgment and thought content normal.         Medical Screening Exam   See Full Note    ED Course   Procedures  Labs Reviewed   URINALYSIS, REFLEX TO URINE CULTURE - Abnormal; Notable for the following components:       Result Value    Protein, UA 30 (*)     Glucose,  (*)     Ketones, UA 15 (*)     Bilirubin, UA Large (*)     Blood, UA Trace-Intact (*)     All other components within normal limits   URINALYSIS, MICROSCOPIC - Abnormal; Notable for the following components:    RBC, UA 3-5 (*)     Bacteria, UA Few (*)     Squamous Epithelial Cells, UA Few (*)     All other components within normal limits   COMPREHENSIVE METABOLIC PANEL - Abnormal; Notable for the following components:    Sodium 131 (*)     BUN 38 (*)     Creatinine 1.45 (*)     BUN/Creatinine Ratio 26 (*)     Calcium 8.2 (*)     Albumin 2.0 (*)     Globulin 5.2 (*)     Bilirubin, Total 14.9 (*)     Alk Phos 208 (*)     AST 76 (*)     eGFR 59 (*)     All other components within normal limits   CBC WITH DIFFERENTIAL - Abnormal; Notable for the following components:    WBC 11.01 (*)     RBC 2.62 (*)     Hemoglobin 10.0 (*)     Hematocrit 28.9 (*)     .3 (*)     MCH 38.2 (*)     RDW 15.0 (*)     Platelet Count 41 (*)     MPV 12.5 (*)     Neutrophils % 74.8 (*)     Lymphocytes % 6.9 (*)     Neutrophils, Abs 8.23 (*)     Lymphocytes, Absolute 0.76 (*)     Monocytes % 17.3 (*)     Eosinophils % 0.8 (*)      Monocytes, Absolute 1.91 (*)     All other components within normal limits   COMPREHENSIVE METABOLIC PANEL - Abnormal; Notable for the following components:    Sodium 131 (*)     BUN 38 (*)     Creatinine 1.55 (*)     BUN/Creatinine Ratio 25 (*)     Calcium 7.8 (*)     Albumin 1.9 (*)     Globulin 4.9 (*)     Bilirubin, Total 14.2 (*)     Alk Phos 192 (*)     AST 67 (*)     eGFR 54 (*)     All other components within normal limits   RAPID INFLUENZA A/B - Normal   LIPASE - Normal   AMYLASE - Normal   LACTIC ACID, PLASMA - Normal   SARS ANTIGEN(AYANNA) - Normal    Narrative:     Negative SARS-CoV results should not be used as the sole basis for treatment or patient management decisions; negative results should be considered in the context of a patient's recent exposures, history and the presene of clinical signs and symptoms consistent with COVID-19.  Negative results should be treated as presumptive and confirmed by molecular assay, if necessary for patient management.   CBC W/ AUTO DIFFERENTIAL    Narrative:     The following orders were created for panel order CBC auto differential.  Procedure                               Abnormality         Status                     ---------                               -----------         ------                     CBC with Differential[[id number]]       Abnormal            Final result               Manual Differential[[id number]]                             Final result                 Please view results for these tests on the individual orders.   MANUAL DIFFERENTIAL          Imaging Results              CT Abdomen Pelvis  Without Contrast (Final result)  Result time 12/09/23 18:59:12      Final result by Dallin Melton II, MD (12/09/23 18:59:12)                   Impression:      Right lower quadrant fluid collection appears slightly separate from the bile detail limited without contrast.  Appendicitis or abscess cannot be excluded.   Cholelithiasis.      Electronically signed by: Dallin Melton  Date:    12/09/2023  Time:    18:59               Narrative:    EXAMINATION:  CT ABDOMEN PELVIS WITHOUT CONTRAST    CLINICAL HISTORY:  lower abdominal pain/fever;    TECHNIQUE:  Axial CT imaging of the abdomen and pelvis is performed without contrast.    CT dose reduction technique used - Dose Rite and tube current modulation.    COMPARISON:  None available    FINDINGS:  Cardiac and lung bases are within normal limits    CT abdomen: The gallbladder has calculi present.  The liver, spleen, pancreas and adrenal glands are normal in size and density.  No evidence of focal lesion is demonstrated in these solid organs.    Kidneys are normal in size and density.  No evidence of hydronephrosis or nephrolithiasis is seen.    The bowel caliber is normal and no wall thickening or adjacent inflammatory change is seen.  No evidence of free fluid or free air is present.  Appendix is not clearly seen.  There is suggestion of fluid collection in the right lower quadrant detail limited without contrast..  The this appears separate from the bowel although does lie near the cecum measuring 4 x 2 cm.    CT pelvis: The bowel and bladder appear within normal limits.  The uterus and ovaries not identified                                       Medications   ibuprofen tablet 600 mg (600 mg Oral Given 12/9/23 1645)   sodium chloride 0.9% bolus 1,000 mL 1,000 mL (0 mLs Intravenous Stopped 12/9/23 1919)     Medical Decision Making  47 y/o AAM with PMH of HTN, thyroid disease, alcohol abuse, anemia and hyperlipidemia presents pov with c/o lower abdominal pain, hematuria, and fever with onset last night. Patient states he was recently dx with cirrhosis of the liver in August. Patient is being followed by [facility] Current med regimen includes Lasix, lactulose, and ferrous sulfate. Patient has f/u visit with pcp next week. Patient had hemorrhoid surgery in August and has a known  umbilical hernia.    Amount and/or Complexity of Data Reviewed  Labs: ordered.     Details: CBC:  CMP:  Lipase:  Lactate:   UA: Bacteria few, Blood trace, Bilirubin large  Flu:   Covid:               ED Course as of 12/09/23 2019   Sat Dec 09, 2023   1900 Fluid bolus given. Will recheck kidney function and order CT abd/pelvis with contrast if improved function for more definitive exam. [[place]]   1940 Kidney function not improved with fluid bolus. [[place]]   1947 Contacted [facility] and spoke with Tyshawn. Dr. Feliciano at [facility] ED agrees to accept patient transfer ED-to-ED. [[place]]      ED Course User Index  [[place]] Nikolai Case FNP                           Clinical Impression:   Final diagnoses:  [R10.30] Lower abdominal pain  [R50.9] Fever, unspecified fever cause  [K35.80] Acute appendicitis, unspecified acute appendicitis type (Primary)  [E86.0] Dehydration        ED Disposition Condition    ED to ED Transfer Stable                Nikolai Case FNP  12/09/23 2019

## 2023-12-10 NOTE — ED NOTES
Ambulance form faxed and patient and confirmed with patient care ambulance. Will be  sending ambulance.

## 2023-12-10 NOTE — ED NOTES
[facility]'s ED updated that patient left our department to be transferred to their facility.

## 2023-12-11 PROBLEM — K92.2 ACUTE LOWER GI BLEEDING: Status: RESOLVED | Noted: 2023-08-20 | Resolved: 2023-12-11

## 2023-12-11 PROBLEM — K92.2 LOWER GI BLEED REQUIRING MORE THAN 4 UNITS OF BLOOD IN 24 HOURS, ICU, OR SURGERY: Status: RESOLVED | Noted: 2023-08-20 | Resolved: 2023-12-11

## 2023-12-11 PROBLEM — K64.9 BLEEDING HEMORRHOID: Status: RESOLVED | Noted: 2023-09-08 | Resolved: 2023-12-11

## 2023-12-28 ENCOUNTER — OFFICE VISIT (OUTPATIENT)
Dept: FAMILY MEDICINE | Facility: CLINIC | Age: 48
End: 2023-12-28
Payer: COMMERCIAL

## 2023-12-28 VITALS
OXYGEN SATURATION: 97 % | BODY MASS INDEX: 49.31 KG/M2 | WEIGHT: 288.81 LBS | HEART RATE: 100 BPM | TEMPERATURE: 98 F | HEIGHT: 64 IN | DIASTOLIC BLOOD PRESSURE: 61 MMHG | SYSTOLIC BLOOD PRESSURE: 94 MMHG

## 2023-12-28 DIAGNOSIS — K70.30 ALCOHOLIC CIRRHOSIS, UNSPECIFIED WHETHER ASCITES PRESENT: Primary | ICD-10-CM

## 2023-12-28 PROCEDURE — 3008F BODY MASS INDEX DOCD: CPT | Mod: CPTII,,, | Performed by: STUDENT IN AN ORGANIZED HEALTH CARE EDUCATION/TRAINING PROGRAM

## 2023-12-28 PROCEDURE — 1159F MED LIST DOCD IN RCRD: CPT | Mod: CPTII,,, | Performed by: STUDENT IN AN ORGANIZED HEALTH CARE EDUCATION/TRAINING PROGRAM

## 2023-12-28 PROCEDURE — 3078F DIAST BP <80 MM HG: CPT | Mod: CPTII,,, | Performed by: STUDENT IN AN ORGANIZED HEALTH CARE EDUCATION/TRAINING PROGRAM

## 2023-12-28 PROCEDURE — 3074F SYST BP LT 130 MM HG: CPT | Mod: CPTII,,, | Performed by: STUDENT IN AN ORGANIZED HEALTH CARE EDUCATION/TRAINING PROGRAM

## 2023-12-28 PROCEDURE — 99205 OFFICE O/P NEW HI 60 MIN: CPT | Mod: ,,, | Performed by: STUDENT IN AN ORGANIZED HEALTH CARE EDUCATION/TRAINING PROGRAM

## 2023-12-28 RX ORDER — FUROSEMIDE 40 MG/1
40 TABLET ORAL 2 TIMES DAILY
COMMUNITY
Start: 2023-09-28

## 2023-12-28 RX ORDER — LACTULOSE 10 G/15ML
30 SOLUTION ORAL; RECTAL 2 TIMES DAILY
COMMUNITY

## 2023-12-28 NOTE — PROGRESS NOTES
Subjective     Patient ID: John Spears is a 48 y.o. male.    Chief Complaint: Leg Swelling (Inpatient at [facility] about 2-3 weeks ago for UTI, dehydration and liver problems.)    HPI    John Spears is a 48 y.o. patient with the medical problems listed below who comes to clinic to establish care.       Chart review shows a recent note at [facility] hepatology clinic 2 weeks ago:   Assessment:   Mr. Spears is a 48 y.o. Black Or  male is here today for evaluation of decompensated cirrhosis of liver (diagnosed 8/2023 on imaging) due to alcohol use complicated by jaundice, mild ascites, pedal edema and hepatic encephalopathy with MELD 27 in 9/2023. He has PMH of HTN, HLD, Obesity, hemorrhoids (s/p banding in 8/2023) and alcohol use disorder (AUD) and abstaining from alcohol since 8/15/23. Denied recreational drug use other than remote h/o cocaine use in his younger age. After diagnosis of cirrhosis at [facility] in 8/2023, he was transferred to [facility] in 9/2023 for LTE. CLD workup at OSH was unremarkable for alternate etiology and EGD 8/2023 did not show any varices. During his hospital stay at [facility], he was treated for MDR E Coli UTI with Meropenem and for hypervolemia, he was diuresed around 100 lbs (Peak weight 346 lbs and peak BMI 52). He underwent a part of LTE inpatient and was found to have relative contraindication from Tx SW due to early remission from alcohol and was recommended OP Addiction Psychiatry evaluation. He was seen by Addiction psychiatry in 11/2023.He was declined for liver transplant by our MDT committee on 12/11/23 due to multiple missed appointments and losing insurance. He now says that he called his insurance and they asked him to disregard the insurance termination letter and he is active on their insurance. I asked him to get a confirmation from his insurance, reschedule Dental appointments and colonoscopy appointments and then let us know. We will again discuss him at  MDT committee if he can get these done.    Plan:     In the setting of cirrhosis, I discussed in detail with the patient that they would need lifetime follow-up, HCC surveillance every 6 months, and variceal surveillance routinely. I discussed with the patient the signs of decompensation and when to notify us. I also discussed the importance of following a 2 gram low sodium diet, encouraged abstinence of tobacco and ETOH, or other potential hepatotoxic substances.     # Decompensated Cirrhosis:  - Etiology: Alcohol. CLD workup at OSH 8/2023: negative acute viral hepatitis panel, HBcAb total negative, negative antimitochondrial, negative anti smooth muscle antibody, negative NADIA, negative acute hepatitis panel, as well as ferritin/ceruloplasmin within normal limits. Iron profile suggested ELY (Low Fe saturation).   - MELD 3.0: 23 at 11/2/2023 8:56 AM  MELD-Na: 22 at 11/2/2023 8:56 AM  Calculated from:  Serum Creatinine: 0.88 mg/dL (Using min of 1 mg/dL) at 11/2/2023 8:56 AM  Serum Sodium: 138 mmol/L (Using max of 137 mmol/L) at 11/2/2023 8:56 AM  Total Bilirubin: 8.34 mg/dL at 11/2/2023 8:56 AM  Serum Albumin: 2.7 g/dL at 11/2/2023 8:56 AM  INR(ratio): 1.89 at 11/2/2023 8:56 AM  Age at listing (hypothetical): 48 years  Sex: Male at 11/2/2023 8:56 AM  - Complicated by Jaundice, Ascites and Encephalopathy  Plan:  - CMP, CBC, PT-INR to assess the synthetic function of the liver and to get a fresh MELD score  - Patient will require MELD labs (total bilirubin, creatinine, and INR) every 3-6 months- this can be done with patient's PCP too.    # Liver Transplant Candidacy:  - He was evaluated for LTE in 9/2023  -MRI liver, TTE, NM Stress test unremarkable. PETH negative 11/2023  -PFT on 11/21/23--> only mild restriction. Quit smoking 5-6 years ago.  -Dental evaluation--> seen 11/2/23 and advised some dental extraction and not done that he had lost his insurance.  --Good Social support  -He was not able to make it to  screening colonoscopy --> rescheduled to 11/17/23 and he again missed. He has never had a colonoscopy. Denies FH of colon cancer.  -As per Tx SW evaluation: relative C/I due to early remission from alcohol and limited finances should f/u with Addiction Psychiatry --> He was seen by Addiction psychiatry in 11/2023.  -He was declined for liver transplant by our MDT committee on 12/11/23 due to multiple missed appointments (mentions transportation issues and lack of funding) and losing insurance.  -He now says that he called [employer] and they asked him to disregard the insurance termination letter and he is active on their insurance. I asked him to get a confirmation from his insurance, reschedule Dental appointments and colonoscopy appointments and then let us know. We will again discuss him at MDT committee if he can get these done.    # HCC surveillance:  - The patient is at increased risk for development of HCC and to undergo surveillance with imaging study as well as AFP every 6 months  - Last Imaging with MRI 9/2023 : no evidence of liver mass. Last AFP normal.    # Variceal surveillance:  - No stigmata of GI bleed.  - Last EGD in 8/2023 at [facility]: showed no varices.  - Repeat EGD due in 8/2024    # Encephalopathy:   - The patient should continue on lactulose and titrate this dose to give 2-3 soft stools daily.   - He is not able to afford rifaximin 500 mg twice daily.   - Patients with a diagnosis of encephalopathy should not drive or operate heavy machinery.     # Ascites:  # Anasarca (peak weight 346 lbs 9/2023)--> improved with diuresis  - Onset: 8/2023 on imaging and mild  - Has never had a paracentesis.  - No fever, denies abdominal pain.  - On lasix 40 mg BID and Aldactone 50 mg daily.  - Advised compliance with 2 gram sodium per day diet.     # Obesity:  - Body mass index is 45.53 kg/m².  - Patient counseled regarding adherence to diet and exercise recommendations. Engagement in low impact exercise  as walking 30 minutes daily for 5 days a week for weight loss was recommended. Patient verbalized understanding.    # Immunizations:   - Patients with chronic liver disease should be vaccinated for HAV and HBV if not immune. Immune to HAV and not to HBV  - Recommend 3 dose HBV vaccination--> Due for 2nd dose HBV vaccine    # Nutrition:  -Daily protein intake should be 1.2-1.5 g/kg/day. Small meals or liquid nutritional supplements evenly distributed throughout the day and a late-night snack should be offered. Maintain a low sodium diet of <2g/day.     # ELY:  -Continue PO iron. Likely due to hematochezia at OSH in 8/2023 s/p Surgical intervention (banding and fulguration). EGD 8/2023 without GI bleeding.     Follow up in 6-8 weeks.      Wrap Up:     --Patient will require MELD labs (total bilirubin, creatinine, and INR) every 3-6 months- this can be done with patient's PCP too.  - Patient is aware not to indulge in any over the counter medication without letting his PCP or us know about it. Limit Tylenol intake to <2g per day.  - Educated on the importance of maintaining a low salt diet of no more than 2 gm of sodium per day. Avoid fast foods, sodas, packaged salad dressings, cold cut meats (sandwich meats), canned foods. Check all food labels and avoid taking more than your daily allowance of sodium and avoid High fructose corn syrup. Daily protein intake should be 1.2-1.5 g/kg/day. Small meals or liquid nutritional supplements evenly distributed throughout the day and a late-night snack should be offered.   - Complete abstinence from alcohol   - Avoid Raw shellfish   - Avoid all NSAIDs including (but not limited to) Advil/Motrin/Ibuprofen, BC powder, Goodies, Meloxicam/mobic, Rayna Jericho   -Continue or change medications as discussed   -Lifestyle modification  -Aim for improved body weight and physical activity  -Will contact for abnormal labs  -Tests/imaging/referrals ordered as documented  - Appointment will be  given at check out today.   - Staff will arrange for labs and orders to be completed prior to follow up visit.  - AVS will be printed and given to patient upon check out.  - Return in about 8 weeks (around 2/8/2024).       Other providers involved in his care include the following:     Upcoming Encounters  Date Type Department Care Team Description   01/09/2024 10:00 AM CST Office Visit [facility] [place] [facility]   [address] [place] [address], [place] [id number] [phone]  Maria Elena Kowalski, EMMANUEL   [address] [place] [address], [place] [address]   [phone] (Work)   [phone] (Fax)      01/30/2024 9:30 AM CST Office Visit [facility]   [address]   [address]   [place] [id number] [phone]  Tricia Dexter,    [address] [place] [address], [place] [address]   [phone] (Work)   [phone] (Fax)      01/31/2024 2:00 PM CST Office Visit [facility]   [address] [place] [address]   [phone]  Micha Payne MD   [address] [place] [address], [place] [address]   [phone] (Work)   [phone] (Fax)      02/08/2024 10:45 AM CST Lab [facility] Trans[facility]   [address] [place] [address]   [phone]  Toya Malik RN      02/08/2024 11:00 AM CST Office Visit [facility]   [address] [place] [address]   [phone]  Catarina Brooke MBBS   [address] [place] [address], [place] [address]   [phone] (Work)   [phone] (Fax)          Looks like he is scheduled for PCP follow-up in [facility] clinic; there is an office note from 10/12/23 that seems to be his initial visit:    History of Present Illness: Mr. John Spears is a 49 yo M with MASH/MEGAN cirrhosis and HTN that presents to clinic to establish care and for hospital follow up. Was admitted to [facility] from 9/9-9/27/2023 for first encounter of cirrhosis. Paracentesis  was not performed as pt had minimal ascitis. Mangaged with IV diueteics and transitioned to lasix/aldactone. Had more than 60 pounds diuresed during admission. Was also treated for ESBL UTI with merrem for 5 days. Hepatology followed throughout course for transplant evaluation. Was discharged with lactulose/rifaximin and lasix/aldactone and hepatology follow up on 11/2. Today, in clinic, pt reports that he continues to improve. Fluid has not re-accumulated and he denies fevers and abdominal pain. Taking his medications as prescribed. He is having 5 BM a day so he reduced his lactulose dose from 30 ml tid to bid, but is still having more than 3. Has a question about his rifaximin as he will run out before his GI appt and it is very expensive. Only complaint that the pt has is burning under his skin where his belly flap is that started after he left the hospital. Has not had any alcohol since discharge and does not plan to start back drinking.    Mr. John Spears is a 49 yo M with [facility] cirrhosis and HTN that presents to clinic to establish care and for hospital follow up.    [facility] cirrhosis  Hospital follow up  - compliant with lasix 40 bid and aldactone 50, no edema today  - compliant with lactulose/rifaximin, having 5 BM a day  - told pt to titrate lactulose to 3 BM daily, currently taking 30 ml bid, told him to decrease to 20 ml and can increase back if not at goal, refilled both  - Hepatology follow up on 11/2, encouraged pt to call clinic if he cannot afford rifaximin since he will run out before appt  - will obtain CMP and refill diuretics if WNL  - praised pt for abstinence from alcohol and encouraged to continue    ELY:  - continue iron  - will order CBC    HTN:  - not currently taking any medications  - today 128/73, will continue to monitor    Health Maintenance:  Colonoscopy- ordered  Vaccines- encouraged flu and shingles at local pharmacy; UTD on covid    Dispo: RTC in 3 months or earlier as  "needed.    Patient was discussed with and seen by Dr. Jamal Reinoso.       Here today with hypotension, worsening peripheral edema, and tachycardia. He is very fatigued. They live in [place] and came to clinic here to get established with a doctor closer to home. They were not aware [facility] has a clinic in [place] but though they could come here and see Dr. Jacobs. He has been taking lasix 40 mg BID but has not been taking spironolactone. Reports adherence to lactulose with 2-3 bowel movements daily.        Objective     BP 94/61   Pulse 100   Temp 97.5 °F (36.4 °C)   Ht 5' 4" (1.626 m)   Wt 131 kg (288 lb 12.8 oz)   SpO2 97%   BMI 49.57 kg/m²     Physical Exam  Gen: well-groomed, well-dressed. No apparent distress but appears chronically ill   HEENT:  scleral icterus  Pulm: normal work of breathing, no accessory muscle use; speaking complete sentences without having to stop  Neuro: CN II-XII grossly normal   Psych: Linear thought; good eye contact  SKIN: no rashes present on face, neck, hands       Assessment and Plan     Problem List Items Addressed This Visit       Alcoholic cirrhosis of liver - Primary   Decompensated cirrhosis with hypotension and tachycardia: needs hospital level of care for decompensated cirrhosis and hemodynamic compromise. Worsening lower extremity edema, which is quite painful for him not likely to respond to oral medications. I do not suspect SBP or sepsis. It is not safe to attempt to increase oral diuretic therapy given his intravascular volume depletion. He is stable currently and I have recommended to go to [facility] ED for admission to medicine to optimize his fluid status as well as evaluation by hepatology and his transplant team. They expressed understanding and agreement with this plan.     "

## 2025-02-11 DIAGNOSIS — Z94.4 HX OF LIVER TRANSPLANT: ICD-10-CM

## 2025-02-11 DIAGNOSIS — N18.32 CHRONIC KIDNEY DISEASE, STAGE 3B: Primary | ICD-10-CM

## 2025-05-08 ENCOUNTER — OFFICE VISIT (OUTPATIENT)
Dept: NEPHROLOGY | Facility: CLINIC | Age: 50
End: 2025-05-08
Payer: MEDICAID

## 2025-05-08 VITALS
SYSTOLIC BLOOD PRESSURE: 132 MMHG | HEIGHT: 64 IN | RESPIRATION RATE: 18 BRPM | BODY MASS INDEX: 47.29 KG/M2 | WEIGHT: 277 LBS | DIASTOLIC BLOOD PRESSURE: 82 MMHG | OXYGEN SATURATION: 98 % | HEART RATE: 75 BPM

## 2025-05-08 DIAGNOSIS — Z94.4 HX OF LIVER TRANSPLANT: ICD-10-CM

## 2025-05-08 DIAGNOSIS — N18.32 CHRONIC KIDNEY DISEASE, STAGE 3B: Primary | ICD-10-CM

## 2025-05-08 DIAGNOSIS — E87.20 METABOLIC ACIDOSIS: ICD-10-CM

## 2025-05-08 DIAGNOSIS — E87.5 HYPERKALEMIA: ICD-10-CM

## 2025-05-08 DIAGNOSIS — I12.9 HYPERTENSIVE NEPHROSCLEROSIS, STAGE 1 THROUGH STAGE 4 OR UNSPECIFIED CHRONIC KIDNEY DISEASE: ICD-10-CM

## 2025-05-08 PROCEDURE — 1159F MED LIST DOCD IN RCRD: CPT | Mod: CPTII,,, | Performed by: NURSE PRACTITIONER

## 2025-05-08 PROCEDURE — 99999 PR PBB SHADOW E&M-EST. PATIENT-LVL V: CPT | Mod: PBBFAC,,, | Performed by: NURSE PRACTITIONER

## 2025-05-08 PROCEDURE — 3066F NEPHROPATHY DOC TX: CPT | Mod: CPTII,,, | Performed by: NURSE PRACTITIONER

## 2025-05-08 PROCEDURE — 3075F SYST BP GE 130 - 139MM HG: CPT | Mod: CPTII,,, | Performed by: NURSE PRACTITIONER

## 2025-05-08 PROCEDURE — 99215 OFFICE O/P EST HI 40 MIN: CPT | Mod: PBBFAC | Performed by: NURSE PRACTITIONER

## 2025-05-08 PROCEDURE — 99205 OFFICE O/P NEW HI 60 MIN: CPT | Mod: S$PBB,,, | Performed by: NURSE PRACTITIONER

## 2025-05-08 PROCEDURE — 3079F DIAST BP 80-89 MM HG: CPT | Mod: CPTII,,, | Performed by: NURSE PRACTITIONER

## 2025-05-08 PROCEDURE — 3008F BODY MASS INDEX DOCD: CPT | Mod: CPTII,,, | Performed by: NURSE PRACTITIONER

## 2025-05-08 RX ORDER — SODIUM BICARBONATE 650 MG/1
650 TABLET ORAL 2 TIMES DAILY
Qty: 60 TABLET | Refills: 5 | Status: SHIPPED | OUTPATIENT
Start: 2025-05-08 | End: 2025-11-04

## 2025-05-08 RX ORDER — FUROSEMIDE 40 MG/1
1 TABLET ORAL 2 TIMES DAILY
COMMUNITY
Start: 2023-09-28 | End: 2025-05-08

## 2025-05-08 RX ORDER — POLYETHYLENE GLYCOL-3350, SODIUM CHLORIDE, POTASSIUM CHLORIDE AND SODIUM BICARBONATE 420; 11.2; 5.72; 1.48 G/438.4G; G/438.4G; G/438.4G; G/438.4G
POWDER, FOR SOLUTION ORAL
COMMUNITY
Start: 2025-01-30

## 2025-05-08 RX ORDER — GABAPENTIN 100 MG/1
100 CAPSULE ORAL 2 TIMES DAILY
COMMUNITY
Start: 2024-07-09

## 2025-05-08 RX ORDER — AMLODIPINE BESYLATE 5 MG/1
5 TABLET ORAL
COMMUNITY
Start: 2025-03-23

## 2025-05-08 RX ORDER — SODIUM BICARBONATE 650 MG/1
650 TABLET ORAL
COMMUNITY
Start: 2025-05-02 | End: 2025-05-08 | Stop reason: SDUPTHER

## 2025-05-08 RX ORDER — TACROLIMUS 1 MG/1
CAPSULE ORAL
COMMUNITY
Start: 2025-05-02

## 2025-05-08 RX ORDER — ATENOLOL AND CHLORTHALIDONE TABLET 50; 25 MG/1; MG/1
1 TABLET ORAL DAILY
COMMUNITY
End: 2025-05-08

## 2025-05-08 RX ORDER — SEVELAMER CARBONATE 800 MG/1
TABLET, FILM COATED ORAL
COMMUNITY
Start: 2025-05-02

## 2025-05-08 RX ORDER — SPIRONOLACTONE 50 MG/1
1 TABLET, FILM COATED ORAL DAILY
COMMUNITY
Start: 2023-09-28 | End: 2025-05-08

## 2025-05-08 RX ORDER — AMLODIPINE BESYLATE 2.5 MG/1
2.5 TABLET ORAL
COMMUNITY
Start: 2025-02-04 | End: 2025-05-08 | Stop reason: DRUGHIGH

## 2025-05-08 RX ORDER — FLUTICASONE PROPIONATE 50 MCG
SPRAY, SUSPENSION (ML) NASAL
COMMUNITY
Start: 2025-03-10

## 2025-05-08 RX ORDER — ATENOLOL 50 MG/1
TABLET ORAL
COMMUNITY
End: 2025-05-08

## 2025-05-08 RX ORDER — NAPROXEN SODIUM 220 MG/1
81 TABLET, FILM COATED ORAL
COMMUNITY
Start: 2025-02-06

## 2025-05-08 RX ORDER — LACTULOSE 10 G/15ML
SOLUTION ORAL; RECTAL
COMMUNITY
Start: 2023-09-28 | End: 2025-05-08

## 2025-05-08 RX ORDER — SODIUM ZIRCONIUM CYCLOSILICATE 10 G/10G
POWDER, FOR SUSPENSION ORAL
COMMUNITY
Start: 2025-04-19

## 2025-05-08 RX ORDER — DICLOFENAC SODIUM 10 MG/G
GEL TOPICAL
COMMUNITY
Start: 2025-03-10

## 2025-05-08 RX ORDER — FERROUS SULFATE 325(65) MG
1 TABLET, DELAYED RELEASE (ENTERIC COATED) ORAL 2 TIMES DAILY
COMMUNITY
Start: 2023-09-28 | End: 2025-05-08

## 2025-05-08 RX ORDER — MYCOPHENOLATE MOFETIL 500 MG/1
500 TABLET ORAL 2 TIMES DAILY
COMMUNITY
Start: 2025-05-02

## 2025-05-08 NOTE — PROGRESS NOTES
[facility] History and Physical  Patient Name: John Spears  MRN: [id number]  Age: 49 y.o.  : 1975    Date: 2025     Chief Complaint: Establish Care    HPI :   Mr Spears presents to Nephrology clinic to establish care. He is followed by IVET Fields as his primary care provider. Significant other present during visit today.     HTN: diagnosed when he was 38. Current regimen includes Amlodipine 5 mg daily, Lasix 40 mg BID    CKD: Sodium bicarb 650 mg BID now; was worked up for kidney transplant in 2024 r/t renal failure. Had been taking ferrous sulfate, received blood for anemia and was getting EPO shots 3x weekly. Was on CRRT before and after liver transplant until 2024.    Liver Trasnplant: status post Orthotopic liver transplant with Choledocho-choledochostomy anastomosis for ETOH cirrhosis on 2023 at [facility]. Was denied for kidney transplant due to ETOH relapse in 2024, has established with Addiction Psychiatry 2025.     Nephrology history: No FH of kidney disease, no nephrolithiasis, or recurrent UTIs. Occasional Tylenol/Ibuprofen use.     Patient denies any CP, SOB, peripheral edema, dysuria, hematuria, changes in urinary habits, or increased frequency of urination.     Past Medical History:  has a past medical history of Cirrhosis of liver (2023), Fatty liver, History of alcohol abuse, Hypertension, Mixed hyperlipidemia, and Thyroid disease.     Past Surgical History:   has a past surgical history that includes Abdominal surgery; Digital rectal examination under anesthesia (N/A, 2023); Excisional hemorrhoidectomy (N/A, 2023); Digital rectal examination under anesthesia (N/A, 2023); return to operating room, for minor postoperative hemorrhage control (2023); Excisional hemorrhoidectomy (N/A, 2023); and Liver transplant (N/A, 2023).     Family History:  family  history includes Cancer in his paternal grandmother; Diabetes in his maternal grandmother, paternal grandmother, sister, and another family member; Hypertension in his father. No family history of kidney disease.     Social History:   reports that he has never smoked. He has never been exposed to tobacco smoke. He has never used smokeless tobacco. He reports that he does not currently use alcohol. He reports current drug use. Drug: Marijuana. Drinking 1-2 beers/day. Has a poor diet and eats sweets often. Today, states he only drinks water and occasionally a glass of pepsi.     Allergies: is allergic to hydromorphone.     Medications: Reviewed including OTC medications, herbal supplements, and NSAIDS.     Old records have been reviewed.      Review of Systems:  ROS: A 10 point ROS was completed and found to be negative except for that mentioned above.          Physical Exam:  Vitals:    05/08/25 0905   BP: 132/82   Pulse: 75   Resp: 18     Constitutional: sitting in chair, in NAD  Eyes: EOMI  ENMT: moist mucus membranes, nares patent  Cardiovascular: normal rate, S1/S2 noted, no edema  Respiratory: symmetrical chest expansion, CTA-B  Gastrointestinal: +BS, soft, NT/ND  Musculoskeletal: normal, no joint erythema/effusions  Skin: no rash, no purpura, warm extremities  Neurological: Alert and Oriented x 3, afocal    Labs:   Lab Results   Component Value Date     (L) 12/13/2023    K 3.9 12/13/2023    CREATININE 0.94 12/13/2023    ALT 46 12/09/2023    AST 67 (H) 12/09/2023    HGBA1C <4.2 (L) 06/13/2024    WBC 11.01 (H) 12/09/2023    HGB 8.1 (L) 12/29/2023    HCT 23.4 (L) 12/29/2023    PLT 41 (L) 12/09/2023      I have personally reviewed office visits with Dr. Brooke whom patient has been following with for liver transplant and post liver transplant.    Assessment/Plan:     CKD stage IIIb-IV due to hypertensive nephrosclerosis. Baseline sCr TBD, today sCr pending. Counseled to avoid nephrotoxic agents such as  NSAIDs. I reviewed renal u/s from 2024 which was only remarkable for 1.2 cm cyst on left kidney that was noted as simple.     Proteinuria: urine Prot:Creat ratio is pending. Patient is not on RAAS blockade.    Anemia: CBC pending, previously taking ferrous sulfate 325mg BID but is not currently (has had blood transfusions and EPO injections in the past).     BMD: Calcium and Phosphorus PTH, Vit D pending-- Will likely need Vitamin D replacement in the past Vitamin D <8.    HTN: well controlled with current meds     Hyperkalemia: Taking Lokelma 10 g daily (Magnesium and Phosphorus checked two weeks ago were normal).    Liver Transplant: On Cellcept 500 mg BID, Prograf       RTC 3-4 months with CBC, RFP, UA, urine for Prot:creat ratio, PTH, Vit D with Dr. Maria Eugenia Ng      Signature:             IVET Weinstein  [facility]  [address] [address]  [phone]        60 minutes of total time spent on the encounter, which includes face to face time and non-face to face time preparing to see the patient (eg, review of tests), Obtaining and/or reviewing separately obtained history, Documenting clinical information in the electronic or other health record, Independently interpreting results (not separately reported) and communicating results to the patient/family/caregiver, or Care coordination (not separately reported).       Date of encounter: 5/8/25

## 2025-05-09 ENCOUNTER — RESULTS FOLLOW-UP (OUTPATIENT)
Dept: NEPHROLOGY | Facility: CLINIC | Age: 50
End: 2025-05-09

## 2025-05-09 ENCOUNTER — TELEPHONE (OUTPATIENT)
Dept: NEPHROLOGY | Facility: CLINIC | Age: 50
End: 2025-05-09
Payer: MEDICAID

## 2025-05-09 DIAGNOSIS — I10 PRIMARY HYPERTENSION: ICD-10-CM

## 2025-05-09 DIAGNOSIS — N18.32 CHRONIC KIDNEY DISEASE, STAGE 3B: ICD-10-CM

## 2025-05-09 DIAGNOSIS — E55.9 VITAMIN D DEFICIENCY, UNSPECIFIED: Primary | ICD-10-CM

## 2025-05-09 DIAGNOSIS — I12.9 HYPERTENSIVE NEPHROSCLEROSIS, STAGE 1 THROUGH STAGE 4 OR UNSPECIFIED CHRONIC KIDNEY DISEASE: ICD-10-CM

## 2025-05-09 RX ORDER — LOSARTAN POTASSIUM 25 MG/1
25 TABLET ORAL DAILY
Qty: 90 TABLET | Refills: 3 | Status: SHIPPED | OUTPATIENT
Start: 2025-05-09 | End: 2026-05-09

## 2025-05-09 RX ORDER — ERGOCALCIFEROL 1.25 MG/1
50000 CAPSULE ORAL
Qty: 12 CAPSULE | Refills: 1 | Status: SHIPPED | OUTPATIENT
Start: 2025-05-09 | End: 2025-10-18

## 2025-05-09 NOTE — TELEPHONE ENCOUNTER
Spoke w/ patients girlfriend, she is aware of his lab work and the medications that were sent in. Reviewed low K diet, she verbalized understanding.

## 2025-05-09 NOTE — TELEPHONE ENCOUNTER
----- Message from IVET Mcintosh sent at 5/9/2025  9:01 AM CDT -----  Please let patient know that kidney function remains stable. CKD IIIb. His Vitamin D is still very low, I have sent Ergo to [facility] in [place]. He needs to continue his sodium bicarb tablets (they   should be taken twice daily). His potassium is mildly elevated-- recommend low potassium diet. He needs to be sure that he is drinking PLENTY of water and hydrating well. Avoid alcohol and NSAIDs.     He is having significant proteinuria, I would like to move him towards goal directed therapies. He is not on an ACEi/ARB, so I'd like for him to start Losartan 25 mg daily. He needs to be sure to   track his BP daily. If his BP becomes low systolic less than 100 or diastolic less than 60 OR he becomes symptomatic from low BP... like dizziness he needs to STOP HIS AMLODIPINE. I sent the Rx to   [facility] in [place] also. Thank you!  ----- Message -----  From: Lab, Background User  Sent: 5/8/2025  10:52 AM CDT  To: IVET Diaz

## (undated) DEVICE — GLOVE PROTEXIS PI SYN SURG 8.0

## (undated) DEVICE — GLOVE 6.5 PROTEXIS PI BLUE

## (undated) DEVICE — GLOVE 8.5 PROTEXIS PI BLUE

## (undated) DEVICE — GOWN NONREINF SET-IN SLV 2XL

## (undated) DEVICE — SYR 10CC LUER LOCK

## (undated) DEVICE — PENCIL ELECTROSURG HOLST W/BLD

## (undated) DEVICE — KIT LITHOTOMY RUSH

## (undated) DEVICE — COUNTER NDL FOAM MAGNET 40/70

## (undated) DEVICE — GLOVE PROTEXIS PI SYN SURG 6.0

## (undated) DEVICE — SPONGE COTTON TRAY 4X4IN

## (undated) DEVICE — HANDLE MEDIVAC SUC YANK BLBOUS

## (undated) DEVICE — SUT 2/0 27IN CHROMIC GUT U

## (undated) DEVICE — NDL ECLIPSE SAFETY 25GX1IN

## (undated) DEVICE — BLADE SURG #15 CARBON STEEL

## (undated) DEVICE — SYR IRRIGATION BULB STER 60ML

## (undated) DEVICE — GLOVE PROTEXIS PI SYN SURG 6.5

## (undated) DEVICE — ELECTRODE NDL EDGE 2 5/6IN

## (undated) DEVICE — TUBE SUCTION MEDI-VAC STERILE

## (undated) DEVICE — TRAY FOLEY CATH 16FR LATEX FRE

## (undated) DEVICE — SOL NACL IRR 1000ML BTL

## (undated) DEVICE — GOWN POLY REINF BRTH SLV XL

## (undated) DEVICE — SUT GUT CHROMIC 3-0 SH 36IN

## (undated) DEVICE — SPONGE LAP XRAY DTECT 18X18IN

## (undated) DEVICE — ELECTRODE BLADE INSULATED 1 IN

## (undated) DEVICE — SUT CTD VICRYL VIL BR UR-6

## (undated) DEVICE — SEALER LIGASURE CRV 18.8CM